# Patient Record
Sex: FEMALE | Race: BLACK OR AFRICAN AMERICAN | NOT HISPANIC OR LATINO | Employment: FULL TIME | URBAN - METROPOLITAN AREA
[De-identification: names, ages, dates, MRNs, and addresses within clinical notes are randomized per-mention and may not be internally consistent; named-entity substitution may affect disease eponyms.]

---
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
## 2018-02-05 ENCOUNTER — TELEPHONE (OUTPATIENT)
Dept: OBSTETRICS AND GYNECOLOGY | Facility: CLINIC | Age: 18
End: 2018-02-05

## 2018-02-05 NOTE — TELEPHONE ENCOUNTER
Left message for pt to call office. Pt needs to schedule appointment for IUD insertion. Make sure pt is on menses. PAXTON

## 2018-02-19 ENCOUNTER — TELEPHONE (OUTPATIENT)
Dept: OBSTETRICS AND GYNECOLOGY | Facility: CLINIC | Age: 18
End: 2018-02-19

## 2018-02-19 NOTE — TELEPHONE ENCOUNTER
Left message to call office. Pt needs to schedule appointment for IUD insertion. Make sure pt is on menses. PAXTON

## 2018-03-26 ENCOUNTER — TELEPHONE (OUTPATIENT)
Dept: OBSTETRICS AND GYNECOLOGY | Facility: CLINIC | Age: 18
End: 2018-03-26

## 2018-05-23 ENCOUNTER — TELEPHONE (OUTPATIENT)
Dept: OBSTETRICS AND GYNECOLOGY | Facility: CLINIC | Age: 18
End: 2018-05-23

## 2018-05-23 NOTE — TELEPHONE ENCOUNTER
Called pt could not leave a message. Pt needs to schedule appointment for IUD insertion when pt is on menses. PAXTON

## 2018-07-25 ENCOUNTER — TELEPHONE (OUTPATIENT)
Dept: OBSTETRICS AND GYNECOLOGY | Facility: CLINIC | Age: 18
End: 2018-07-25

## 2018-12-11 ENCOUNTER — INITIAL PRENATAL (OUTPATIENT)
Dept: OBSTETRICS AND GYNECOLOGY | Facility: CLINIC | Age: 18
End: 2018-12-11

## 2018-12-11 VITALS — SYSTOLIC BLOOD PRESSURE: 119 MMHG | DIASTOLIC BLOOD PRESSURE: 74 MMHG | WEIGHT: 158 LBS | BODY MASS INDEX: 26.29 KG/M2

## 2018-12-11 DIAGNOSIS — N92.6 MISSED MENSES: ICD-10-CM

## 2018-12-11 DIAGNOSIS — Z02.83 ENCOUNTER FOR DRUG SCREENING: ICD-10-CM

## 2018-12-11 DIAGNOSIS — Z11.3 SCREEN FOR STD (SEXUALLY TRANSMITTED DISEASE): ICD-10-CM

## 2018-12-11 DIAGNOSIS — Z34.81 MULTIGRAVIDA IN FIRST TRIMESTER: Primary | ICD-10-CM

## 2018-12-11 DIAGNOSIS — O21.9 NAUSEA AND VOMITING IN PREGNANCY: ICD-10-CM

## 2018-12-11 DIAGNOSIS — Z11.4 SCREENING FOR HIV (HUMAN IMMUNODEFICIENCY VIRUS): ICD-10-CM

## 2018-12-11 PROBLEM — Z30.09 BIRTH CONTROL COUNSELING: Status: RESOLVED | Noted: 2017-12-20 | Resolved: 2018-12-11

## 2018-12-11 PROBLEM — Z20.2 CHLAMYDIA CONTACT, TREATED: Status: RESOLVED | Noted: 2017-12-20 | Resolved: 2018-12-11

## 2018-12-11 PROBLEM — A74.9 CHLAMYDIA INFECTION: Status: RESOLVED | Noted: 2017-12-12 | Resolved: 2018-12-11

## 2018-12-11 PROBLEM — Z30.011 ENCOUNTER FOR INITIAL PRESCRIPTION OF CONTRACEPTIVE PILLS: Status: RESOLVED | Noted: 2017-12-20 | Resolved: 2018-12-11

## 2018-12-11 LAB
B-HCG UR QL: POSITIVE
INTERNAL NEGATIVE CONTROL: NEGATIVE
INTERNAL POSITIVE CONTROL: POSITIVE
Lab: ABNORMAL

## 2018-12-11 PROCEDURE — 81025 URINE PREGNANCY TEST: CPT | Performed by: OBSTETRICS & GYNECOLOGY

## 2018-12-11 PROCEDURE — 99214 OFFICE O/P EST MOD 30 MIN: CPT | Performed by: OBSTETRICS & GYNECOLOGY

## 2018-12-11 RX ORDER — DIPHENHYDRAMINE HYDROCHLORIDE 25 MG/1
25 CAPSULE ORAL 3 TIMES DAILY
Qty: 90 TABLET | Refills: 2 | Status: SHIPPED | OUTPATIENT
Start: 2018-12-11 | End: 2019-01-24

## 2018-12-11 RX ORDER — ASCORBIC ACID, CALCIUM CITRATE, IRON, VITAMIN D, DL- ALPHA- TOCOPHEROL ACETATE, THIAMINE, RIBOFLAVIN, NIACINAMIDE, PYRIDOXINE HYDROCHLORIDE, FOLIC ACID, IODINE, ZINC, COPPER, DOCUSATE SODIUM, DOCONEXENT AND ICOSAPENT
1 KIT DAILY
Qty: 30 TABLET | Refills: 11 | Status: SHIPPED | OUTPATIENT
Start: 2018-12-11 | End: 2019-11-19

## 2018-12-11 NOTE — PROGRESS NOTES
Chief complaint: Prenatal visit  History of present illness: Patient is here for her initial prenatal visit.  She states that her last menstrual period was 18.  She reports that she had a positive pregnancy test about 2-2-1/2 weeks ago.  She states that she has also been feeling nauseated for about the last 2 weeks.  She does not recall having a lot of nausea or vomiting during her first pregnancy.  She denies pelvic pain or vaginal bleeding.  She does report occasional episodes of sharp chest pains that radiate into her back.  She also states that she feels short of breath when these issues occur.  She states that they mostly happen when she is at work.  She was wondering if this is normal in pregnancy.  She is not having these symptoms currently.  She does have a history of asthma but has not required medication for several years.  She quit smoking when she found out that she was pregnant.    Obstetric History       T1      L1     SAB0   TAB0   Ectopic0   Molar0   Multiple0   Live Births1       # Outcome Date GA Lbr Brain/2nd Weight Sex Delivery Anes PTL Lv   2 Current            1 Term 17 40w3d 21:50 / 01:35 3669 g (8 lb 1.4 oz) M Vag-Spont EPI N DUSTY      Name: SILVESTRE [facility]      Apgar1:  8                Apgar5: 9        Past Medical History:   Diagnosis Date   • Asthma      Past Surgical History:   Procedure Laterality Date   • WISDOM TOOTH EXTRACTION       Family History   Problem Relation Age of Onset   • Diabetes Father      Social History     Tobacco Use   • Smoking status: Former Smoker   • Smokeless tobacco: Never Used   Substance Use Topics   • Alcohol use: No   • Drug use: No     Meds: None    No Known Allergies     ROS:  General: No fever or chills  Constitutional: No weight loss or gain, no hair loss  HENT: No headache, no hearing loss, no tinnitus  Eyes: normal vision, no eye pain  Lungs: Pos cough, Pos shortness of breath - but none o fthese today  Heart: No chest  pain, no palpitations  Abdomen: Pos nausea, Pos vomiting, no constipation or diarrhea  : No dysuria, no hematuria  Skin: No rashes  Lymph: No swelling  Neuro: No parathesia, no weakness  Psych: Normal though content, no hallucinations, no SI/HI    PE:  Vitals:    18 1454   BP: 119/74   Weight: 71.7 kg (158 lb)     See physical exam in initial prenatal flowsheet.  Of note, lungs are clear to auscultation without crackles or wheezes.  Patient has normal respiratory effort and is able to perform complete sentences without difficulty    Assessment:  1.  17-year-old  2 para 1 at 4-6/7 weeks gestational age  2.  Nausea and vomiting of pregnancy  3.  Atypical chest pain, not current    Plan:  1. Initial prenatal counseling performed today. Hospital and call coverage system discussed. Pt is recommended to start PNV. Prenatal care educational handouts provided in after visit summary. OTC medications discussed. Discussed typical schedule of prenatal visits. Obtain US 1-2 wks for dating. ROutine labs today with cultures and OB profile panel.  2. Discussed nausea and vomiting in pregnancy. Discussed diet and lifestyle modifications to help prevent nausea. Discussed use of vitamin B6 and doxylamine up to three times a day as first line pharmacologic therapy. Will send prescription to pharmacy. Weight gain expectations discussed with pt.  3.  Patient's history is had a positive pregnancy test for 2 weeks and has been nauseated for 2 weeks.  As such it would seem that she might be further along than what we would think based on her last menstrual period.  The patient states it is not possible for her to be further along.  We will try to get an ultrasound in 1-2 weeks to confirm viability and dating.  4.  Regarding the patient's chest pain, I explained that this is not a normal complication of pregnancy.  She currently feels well with normal vital signs in a normal heart and lung exam.  He doesn't feel there is need  for immediate evaluation.  I feel the risk of serious etiologies such as pulmonary embolism, MI, aortic aneurysm, etc. are extremely unlikely, especially given current findings.  I explained to the patient that if she begins to have the symptoms again, she should come into the hospital for evaluation.  She verbalized understanding.

## 2018-12-12 LAB — HCG INTACT+B SERPL-ACNC: NORMAL MIU/ML

## 2018-12-13 DIAGNOSIS — A74.9 CHLAMYDIA INFECTION AFFECTING PREGNANCY IN FIRST TRIMESTER: Primary | ICD-10-CM

## 2018-12-13 DIAGNOSIS — O98.811 CHLAMYDIA INFECTION AFFECTING PREGNANCY IN FIRST TRIMESTER: Primary | ICD-10-CM

## 2018-12-13 LAB
ABO GROUP BLD: (no result)
BACTERIA UR CULT: NO GROWTH
BACTERIA UR CULT: NORMAL
BASOPHILS # BLD AUTO: 0 X10E3/UL (ref 0–0.2)
BASOPHILS NFR BLD AUTO: 0 %
BLD GP AB SCN SERPL QL: NEGATIVE
C TRACH RRNA SPEC QL NAA+PROBE: POSITIVE
EOSINOPHIL # BLD AUTO: 0.1 X10E3/UL (ref 0–0.4)
EOSINOPHIL NFR BLD AUTO: 1 %
ERYTHROCYTE [DISTWIDTH] IN BLOOD BY AUTOMATED COUNT: 13.6 % (ref 12.3–15.4)
HBV SURFACE AG SERPL QL IA: NEGATIVE
HCT VFR BLD AUTO: 37 % (ref 34–46.6)
HCV AB S/CO SERPL IA: 0.2 S/CO RATIO (ref 0–0.9)
HGB BLD-MCNC: 12.3 G/DL (ref 11.1–15.9)
HIV 1+2 AB+HIV1 P24 AG SERPL QL IA: NON REACTIVE
IMM GRANULOCYTES # BLD: 0 X10E3/UL (ref 0–0.1)
IMM GRANULOCYTES NFR BLD: 0 %
LYMPHOCYTES # BLD AUTO: 2.1 X10E3/UL (ref 0.7–3.1)
LYMPHOCYTES NFR BLD AUTO: 30 %
MCH RBC QN AUTO: 31.9 PG (ref 26.6–33)
MCHC RBC AUTO-ENTMCNC: 33.2 G/DL (ref 31.5–35.7)
MCV RBC AUTO: 96 FL (ref 79–97)
MONOCYTES # BLD AUTO: 0.7 X10E3/UL (ref 0.1–0.9)
MONOCYTES NFR BLD AUTO: 9 %
N GONORRHOEA RRNA SPEC QL NAA+PROBE: NEGATIVE
NEUTROPHILS # BLD AUTO: 4.3 X10E3/UL (ref 1.4–7)
NEUTROPHILS NFR BLD AUTO: 60 %
PLATELET # BLD AUTO: 213 X10E3/UL (ref 150–379)
RBC # BLD AUTO: 3.86 X10E6/UL (ref 3.77–5.28)
RH BLD: POSITIVE
RPR SER QL: NON REACTIVE
RUBV IGG SERPL IA-ACNC: 3.16 INDEX
T VAGINALIS RRNA VAG QL NAA+PROBE: NEGATIVE
WBC # BLD AUTO: 7.1 X10E3/UL (ref 3.4–10.8)

## 2018-12-13 RX ORDER — AZITHROMYCIN 500 MG/1
TABLET, FILM COATED ORAL
Qty: 2 TABLET | Refills: 0 | Status: SHIPPED | OUTPATIENT
Start: 2018-12-13 | End: 2018-12-27

## 2018-12-14 ENCOUNTER — TELEPHONE (OUTPATIENT)
Dept: OBSTETRICS AND GYNECOLOGY | Facility: CLINIC | Age: 18
End: 2018-12-14

## 2018-12-14 NOTE — TELEPHONE ENCOUNTER
----- Message from Darin Casas MD sent at 12/13/2018  3:09 PM EST -----  Notify the patient that her chlamydia test was positive.  I sent in a prescription for azithromycin.  She will need to notify her partner and he will need to be tested and/or treated as well.  They must abstain from sexual intercourse until 7 days after both have completed treatment.

## 2018-12-19 LAB
11OH-THC SPEC-MCNC: NEGATIVE NG/ML
AMPHETAMINES SERPL QL SCN: NEGATIVE NG/ML
BARBITURATES SERPL QL SCN: NEGATIVE UG/ML
BENZODIAZ SERPL QL SCN: NEGATIVE NG/ML
CANNABIDIOL SERPLBLD CFM-MCNC: NEGATIVE NG/ML
CANNABINOIDS SERPL QL SCN: ABNORMAL NG/ML
CANNABINOIDS SPEC QL CFM: POSITIVE
CANNABINOL: NEGATIVE NG/ML
CARBOXYTHC SPEC-MCNC: 3.9 NG/ML
COCAINE+BZE SERPL QL SCN: NEGATIVE NG/ML
METHADONE SERPL QL SCN: NEGATIVE NG/ML
OPIATES SERPL QL SCN: NEGATIVE NG/ML
OXYCODONE+OXYMORPHONE SERPLBLD QL SCN: NEGATIVE NG/ML
PCP SERPL QL SCN: NEGATIVE NG/ML
PROPOXYPH SERPL QL SCN: NEGATIVE NG/ML
THC SERPLBLD CFM-MCNC: NEGATIVE NG/ML

## 2018-12-27 ENCOUNTER — ROUTINE PRENATAL (OUTPATIENT)
Dept: OBSTETRICS AND GYNECOLOGY | Facility: CLINIC | Age: 18
End: 2018-12-27

## 2018-12-27 ENCOUNTER — PROCEDURE VISIT (OUTPATIENT)
Dept: OBSTETRICS AND GYNECOLOGY | Facility: CLINIC | Age: 18
End: 2018-12-27

## 2018-12-27 VITALS — DIASTOLIC BLOOD PRESSURE: 60 MMHG | SYSTOLIC BLOOD PRESSURE: 116 MMHG | WEIGHT: 164 LBS | BODY MASS INDEX: 27.29 KG/M2

## 2018-12-27 DIAGNOSIS — O21.9 NAUSEA AND VOMITING IN PREGNANCY: ICD-10-CM

## 2018-12-27 DIAGNOSIS — J45.909 ASTHMA AFFECTING PREGNANCY IN FIRST TRIMESTER: ICD-10-CM

## 2018-12-27 DIAGNOSIS — O99.511 ASTHMA AFFECTING PREGNANCY IN FIRST TRIMESTER: ICD-10-CM

## 2018-12-27 DIAGNOSIS — Z36.89 ENCOUNTER TO ESTABLISH GESTATIONAL AGE USING ULTRASOUND: Primary | ICD-10-CM

## 2018-12-27 DIAGNOSIS — Z34.81 MULTIGRAVIDA IN FIRST TRIMESTER: Primary | ICD-10-CM

## 2018-12-27 PROBLEM — Z11.4 SCREENING FOR HIV (HUMAN IMMUNODEFICIENCY VIRUS): Status: RESOLVED | Noted: 2018-12-11 | Resolved: 2018-12-27

## 2018-12-27 PROBLEM — Z02.83 ENCOUNTER FOR DRUG SCREENING: Status: RESOLVED | Noted: 2018-12-11 | Resolved: 2018-12-27

## 2018-12-27 PROCEDURE — 99213 OFFICE O/P EST LOW 20 MIN: CPT | Performed by: OBSTETRICS & GYNECOLOGY

## 2018-12-27 PROCEDURE — 76817 TRANSVAGINAL US OBSTETRIC: CPT | Performed by: OBSTETRICS & GYNECOLOGY

## 2018-12-27 RX ORDER — ALBUTEROL SULFATE 90 UG/1
AEROSOL, METERED RESPIRATORY (INHALATION)
Refills: 1 | COMMUNITY
Start: 2018-12-24 | End: 2020-12-04

## 2018-12-27 NOTE — PROGRESS NOTES
Chief complaint: Prenatal visit  History of present illness: Patient is here for her routine prenatal visit.  She did go to [facility] about 4 days ago because of concerns of chest pain and shortness of breath.  Workup there was unremarkable.  Felt that may be related to asthma.  Patient was given a prescription for a new inhaler, and she states that she has done well since then.  She states that she does still have some nausea, but no significant episodes of emesis.  She has gained 6 pounds since her initial visit.  She reports that both she and her partner did complete treatment for Chlamydia.  Objective: See vital signs in flowsheet  Gen.: No acute distress, awake and oriented ×3  Abdomen: Soft, nontender, fetal heart tones 159  Extremities: No edema, no tenderness  Psychiatric: Good judgment and insight, normal affect and mood  Labs: Prenatal labs reviewed, flowsheet updated  Ultrasound today: Live single intrauterine pregnancy measuring 7 weeks and 4 days, consistent with dates  Assessment:  1.  18-year-old  2 para 1 at 7 and one sevenths weeks gestational age  2.  Recent chlamydia infection, status post treatment  3.  Nausea of pregnancy, stable  4.  Asthma in pregnancy, stable  Plan:  1.  Ultrasound and laboratory results reviewed with the patient.  Limitations of testing explained.  We will plan ultrasound in 18-20 weeks for anatomy.  2.  Continue albuterol inhaler as needed.  I do not feel that her chest pain issues are of cardiac etiology based on symptoms, presentation, and findings in the emergency room.  3.  Return to office in 4 weeks

## 2019-01-24 ENCOUNTER — ROUTINE PRENATAL (OUTPATIENT)
Dept: OBSTETRICS AND GYNECOLOGY | Facility: CLINIC | Age: 19
End: 2019-01-24

## 2019-01-24 VITALS — WEIGHT: 167 LBS | BODY MASS INDEX: 27.79 KG/M2 | DIASTOLIC BLOOD PRESSURE: 77 MMHG | SYSTOLIC BLOOD PRESSURE: 119 MMHG

## 2019-01-24 DIAGNOSIS — Z36.0 ENCOUNTER FOR ANTENATAL SCREENING FOR CHROMOSOMAL ANOMALIES: ICD-10-CM

## 2019-01-24 DIAGNOSIS — Z11.3 SCREEN FOR STD (SEXUALLY TRANSMITTED DISEASE): ICD-10-CM

## 2019-01-24 DIAGNOSIS — Z34.81 MULTIGRAVIDA IN FIRST TRIMESTER: Primary | ICD-10-CM

## 2019-01-24 LAB — EXTERNAL NIPT: NORMAL

## 2019-01-24 PROCEDURE — 99213 OFFICE O/P EST LOW 20 MIN: CPT | Performed by: OBSTETRICS & GYNECOLOGY

## 2019-01-24 NOTE — PROGRESS NOTES
Chief complaint: Prenatal visit  History present was: Patient has no major complaints today.  She is here for routine prenatal visit.  No nausea or vomiting.  No vaginal bleeding or leakage of fluid.  Objective: See vital signs in flowsheet  Gen.: No acute distress, awake and oriented ×3  Abdomen: Soft, nontender, no hernias or masses, fetal heart tones 160  Extremities: No edema, no tenderness  Psychiatric: Good judgment and insight, normal affect and mood  Assessment:  1.  18-year-old  2 para 1 at 11 and one sevenths weeks gestational age  2.  History of chlamydia infection in the first trimester, status post treatment  Plan:  1.  Gonorrhea and chlamydia test cure today.  2.  We discussed screening for aneuploidy.  The patient like to proceed with cell free DNA.  This will be drawn today.  3.  Return to the office in 4 weeks.

## 2019-01-26 LAB
C TRACH RRNA SPEC QL NAA+PROBE: NEGATIVE
N GONORRHOEA RRNA SPEC QL NAA+PROBE: NEGATIVE
T VAGINALIS RRNA VAG QL NAA+PROBE: NEGATIVE

## 2019-02-01 ENCOUNTER — TELEPHONE (OUTPATIENT)
Dept: OBSTETRICS AND GYNECOLOGY | Facility: CLINIC | Age: 19
End: 2019-02-01

## 2019-02-01 LAB
# FETUSES US: 1
CFDNA.FET/CFDNA.TOTAL SFR FETUS: 8.7 %
CHR X + Y ANEUP PLAS.CFDNA: NORMAL
CITATION REF LAB TEST: NORMAL
CYTOGENETICS STUDY: NORMAL
FET 13+18+21+X+Y ANEUP PLAS.CFDNA: NORMAL
FET CHR 13 TS PLAS.CFDNA QL: NORMAL
FET CHR 13 TS PLAS.CFDNA QL: NORMAL
FET CHR 18 TS PLAS.CFDNA QL: NORMAL
FET CHR 18 TS PLAS.CFDNA QL: NORMAL
FET CHR 21 TS PLAS.CFDNA QL: NORMAL
FET CHR 21 TS PLAS.CFDNA QL: NORMAL
FET CHROM X + Y ANEUP CFDNA IMP: NORMAL
GA: 11.1 WEEKS
GENETIC ALGORITHM SENSITIVITY: NORMAL %
LAB DIRECTOR NAME PROVIDER: NORMAL
Lab: NORMAL
REASON FOR REFERRAL (NARRATIVE): NORMAL
REF LAB TEST METHOD: NORMAL
SERVICE CMNT 02-IMP: NORMAL
SERVICE CMNT-IMP: NORMAL

## 2019-02-01 NOTE — TELEPHONE ENCOUNTER
----- Message from Darin Casas MD sent at 2/1/2019  1:06 PM EST -----  Notify the patient that her cell free DNA (chromosome test) test was normal.  If she would like to know the gender of the baby, the baby is a boy

## 2019-02-21 ENCOUNTER — ROUTINE PRENATAL (OUTPATIENT)
Dept: OBSTETRICS AND GYNECOLOGY | Facility: CLINIC | Age: 19
End: 2019-02-21

## 2019-02-21 VITALS — BODY MASS INDEX: 28.96 KG/M2 | WEIGHT: 174 LBS | SYSTOLIC BLOOD PRESSURE: 121 MMHG | DIASTOLIC BLOOD PRESSURE: 66 MMHG

## 2019-02-21 DIAGNOSIS — Z34.82 MULTIGRAVIDA IN SECOND TRIMESTER: Primary | ICD-10-CM

## 2019-02-21 PROBLEM — Z34.81 MULTIGRAVIDA IN FIRST TRIMESTER: Status: RESOLVED | Noted: 2018-12-11 | Resolved: 2019-02-21

## 2019-02-21 PROBLEM — Z36.0 ENCOUNTER FOR ANTENATAL SCREENING FOR CHROMOSOMAL ANOMALIES: Status: RESOLVED | Noted: 2019-01-24 | Resolved: 2019-02-21

## 2019-02-21 PROBLEM — Z11.3 SCREEN FOR STD (SEXUALLY TRANSMITTED DISEASE): Status: RESOLVED | Noted: 2018-12-11 | Resolved: 2019-02-21

## 2019-02-21 PROCEDURE — 99213 OFFICE O/P EST LOW 20 MIN: CPT | Performed by: OBSTETRICS & GYNECOLOGY

## 2019-02-21 NOTE — PROGRESS NOTES
Chief complaint: Prenatal visit  History of present illness: Patient is here for her routine prenatal visit.  She did go to [facility] emergency room about 10 days ago with concerns over abdominal pains.  She had reported sharp pains on the right lower quadrant that seemed to happen more when she was laying down and went to get up.  She also reports some occasional achy pains in her thighs.  Otherwise she is doing well.  She has started to notice fetal movement just today.  No vaginal bleeding or leakage of fluid.  When she was at [facility], ultrasound was performed which was unremarkable.  She was diagnosed with round ligament pain.  Objective: See vital signs and flowsheet  General: No acute distress, awake and oriented x3  Abdomen: Soft, nontender, nondistended, no hernias or masses, fetal heart tones 150  Extremities: No edema, no tenderness  Psychiatric: Good judgment and insight, normal affect and mood  Labs: Noninvasive prenatal testing with no aneuploidy  Assessment:  1.  18-year-old  2 para 1 at 15-1/7 weeks gestational age  2.  Recent pains consistent with round ligament pain  Plan:  1.  We discussed the patient's recent concerns of her pains.  They sound classical for round ligament pain.  Reassurance offered.  She may try over-the-counter Tylenol, heating pads on her thighs or hips, etc.  2.  Discussed results of cell free DNA testing.  Limitations of testing explained.  3.  Explained to patient that she will need to come to [facility] for now on.  Explained that our doctors do not admit at [facility].  She verbalized understanding.  For return to the office in 4 weeks.  Ultrasound for vivi in 4 weeks

## 2019-03-20 ENCOUNTER — PROCEDURE VISIT (OUTPATIENT)
Dept: OBSTETRICS AND GYNECOLOGY | Facility: CLINIC | Age: 19
End: 2019-03-20

## 2019-03-20 ENCOUNTER — ROUTINE PRENATAL (OUTPATIENT)
Dept: OBSTETRICS AND GYNECOLOGY | Facility: CLINIC | Age: 19
End: 2019-03-20

## 2019-03-20 VITALS — SYSTOLIC BLOOD PRESSURE: 109 MMHG | BODY MASS INDEX: 30.12 KG/M2 | WEIGHT: 181 LBS | DIASTOLIC BLOOD PRESSURE: 72 MMHG

## 2019-03-20 DIAGNOSIS — Z34.82 MULTIGRAVIDA IN SECOND TRIMESTER: Primary | ICD-10-CM

## 2019-03-20 DIAGNOSIS — Z36.89 ENCOUNTER FOR FETAL ANATOMIC SURVEY: Primary | ICD-10-CM

## 2019-03-20 PROCEDURE — 76805 OB US >/= 14 WKS SNGL FETUS: CPT | Performed by: OBSTETRICS & GYNECOLOGY

## 2019-03-20 PROCEDURE — 99212 OFFICE O/P EST SF 10 MIN: CPT | Performed by: OBSTETRICS & GYNECOLOGY

## 2019-03-20 RX ORDER — FLUTICASONE PROPIONATE 50 MCG
SPRAY, SUSPENSION (ML) NASAL
COMMUNITY
End: 2020-12-04

## 2019-03-20 NOTE — PROGRESS NOTES
Chief complaint: Prenatal visit  History of present illness: Patient is here for her routine prenatal visit.  She has no major complaints today.  Normal fetal movement.  No contractions, vaginal bleeding, leakage of fluid.  Objective: See vital signs and flowsheet  General: No acute distress, awake and oriented x3  Abdomen: Soft, nontender, no hernias or masses, fetal heart tones 150  Extremities: No edema, no tenderness  Psychiatric: Good judgment and insight, normal affect mood  Ultrasound today: Normal anatomic survey, appropriate growth  Assessment:   1.  18-year-old  2 para 1 at 19-0/7 weeks gestational age  2.  Asthma in pregnancy, mild intermittent  Plan:  1.  Ultrasound findings were discussed with the patient.  Limitations of ultrasound were explained.  2.  I notified the patient that I will be leaving the practice after 2019.  Patient says that she will consider which other doctors she would like to see in the group.  She will see me for her next visit.  Return to the office in 4 weeks.

## 2019-04-17 ENCOUNTER — ROUTINE PRENATAL (OUTPATIENT)
Dept: OBSTETRICS AND GYNECOLOGY | Facility: CLINIC | Age: 19
End: 2019-04-17

## 2019-04-17 VITALS — BODY MASS INDEX: 31.95 KG/M2 | SYSTOLIC BLOOD PRESSURE: 113 MMHG | DIASTOLIC BLOOD PRESSURE: 73 MMHG | WEIGHT: 192 LBS

## 2019-04-17 DIAGNOSIS — O99.511 ASTHMA AFFECTING PREGNANCY IN FIRST TRIMESTER: ICD-10-CM

## 2019-04-17 DIAGNOSIS — Z13.1 SCREENING FOR DIABETES MELLITUS: ICD-10-CM

## 2019-04-17 DIAGNOSIS — Z34.82 MULTIGRAVIDA IN SECOND TRIMESTER: Primary | ICD-10-CM

## 2019-04-17 DIAGNOSIS — J45.909 ASTHMA AFFECTING PREGNANCY IN FIRST TRIMESTER: ICD-10-CM

## 2019-04-17 PROCEDURE — 99214 OFFICE O/P EST MOD 30 MIN: CPT | Performed by: OBSTETRICS & GYNECOLOGY

## 2019-04-17 NOTE — PROGRESS NOTES
Chief complaint: Prenatal visit  History of present illness: Patient is here for her routine prenatal visit.  She reports that she had not felt the baby move at all today.  She reports typically the baby moves frequently throughout the day.  During the process of the interview, the patient actually noted that the baby was starting to move.  When we auscultated the fetal heart tones, audible fetal movements were noted, and the patient was able to state that she was feeling movements at that time.  Patient is otherwise without major complaints.  Objective: See vital signs and flowsheet  General: No acute distress, awake and oriented x3  Abdomen: Soft, nontender, no hernias or masses, fetal heart tones 150, audible fetal movements auscultated with a Doppler  Extremities: No lower extremity edema, no tenderness  Psychiatric: Good judgment insight, normal affect mood  Assessment:  1.  18-year-old  2 para 1 at 23-0/7 weeks gestational age  2.  Asthma in pregnancy, mild intermittent  3.  Excessive weight gain  Plan:  1.  We discussed expectations for fetal movement.  We discussed fetal kick counts after 24 weeks gestation.  2.  When I was in the room, the patient had a bag of chips, a bag of chocolate chip cookies, and Mountain Dew.  Patient reports that she does snack frequently throughout the day.  We discussed healthier dietary choices.  Advised to minimize caffeine consumption.  Advised to minimize carbohydrate and sugar consumption.  Patient needs to keep an eye on her weight gain, because it has been ahead of schedule.  3.  Glucose screen at the next visit.  Return to the office in 4 weeks

## 2019-05-01 ENCOUNTER — TELEPHONE (OUTPATIENT)
Dept: OBSTETRICS AND GYNECOLOGY | Facility: CLINIC | Age: 19
End: 2019-05-01

## 2019-05-01 NOTE — TELEPHONE ENCOUNTER
OB called and she states that she is feeling dizzy and just not herself. When your standing your mind goes blank and out of breath to where she has to sit down. That's how she explained. She would like to know if that is normal and if there is anything that she can do.     Can leave a detailed message on voicemail.      Thank you

## 2019-05-01 NOTE — TELEPHONE ENCOUNTER
She should try to hydrate a lot - drink several glasses of water - and rest. If this feeling persists or if she is very concerned, she could go to [facility] to be evaluated.

## 2019-05-15 ENCOUNTER — ROUTINE PRENATAL (OUTPATIENT)
Dept: OBSTETRICS AND GYNECOLOGY | Facility: CLINIC | Age: 19
End: 2019-05-15

## 2019-05-15 VITALS — DIASTOLIC BLOOD PRESSURE: 73 MMHG | BODY MASS INDEX: 33.45 KG/M2 | WEIGHT: 201 LBS | SYSTOLIC BLOOD PRESSURE: 113 MMHG

## 2019-05-15 DIAGNOSIS — J45.909 ASTHMA AFFECTING PREGNANCY IN FIRST TRIMESTER: ICD-10-CM

## 2019-05-15 DIAGNOSIS — O99.511 ASTHMA AFFECTING PREGNANCY IN FIRST TRIMESTER: ICD-10-CM

## 2019-05-15 DIAGNOSIS — O26.03 EXCESSIVE WEIGHT GAIN DURING PREGNANCY IN THIRD TRIMESTER: ICD-10-CM

## 2019-05-15 DIAGNOSIS — Z13.1 SCREENING FOR DIABETES MELLITUS: ICD-10-CM

## 2019-05-15 DIAGNOSIS — Z34.82 MULTIGRAVIDA IN SECOND TRIMESTER: Primary | ICD-10-CM

## 2019-05-15 PROCEDURE — 99213 OFFICE O/P EST LOW 20 MIN: CPT | Performed by: OBSTETRICS & GYNECOLOGY

## 2019-05-15 NOTE — PROGRESS NOTES
Chief complaint: Prenatal visit  History of present illness: Patient is here for routine prenatal visit.  She has no major complaints today.  She reports good fetal movement.  She denies contractions, vaginal bleeding, leakage of fluid.  Objective: See vital signs and flowsheet  General: No acute distress, awake and oriented x3  Abdomen: Soft, nontender, gravid, fetal heart tones 145  Extremities: No lower extremity edema, no tenderness  Psychiatric: Good judgment insight, normal affect and mood  Assessment:  1.  18-year-old  2 para 1 at 27-0/7 weeks gestational age  2.  Asthma in pregnancy, mild intermittent  3.  Excessive weight gain in pregnancy  Plan:  1.  Patient has already gained about 45 pounds.  I explained that this is over the recommended weight gain.  We discussed diet and exercise in pregnancy.  The patient is encouraged to monitor weight gain throughout the pregnancy.  2.  We discussed breast-feeding.  The patient feels that she would like to try to breast-feed for longer in this pregnancy.  3 we discussed postpartum contraceptive plans.  Various options were discussed, the patient seems most interested in Mirena.  Risk, benefits, alternatives, and side effects discussed.  4. return to the office in 2 weeks in 4 weeks.  Ultrasound in 4 weeks for growth secondary to excessive weight gain and asthma

## 2019-05-16 ENCOUNTER — TELEPHONE (OUTPATIENT)
Dept: OBSTETRICS AND GYNECOLOGY | Facility: CLINIC | Age: 19
End: 2019-05-16

## 2019-05-16 LAB — GLUCOSE 1H P 50 G GLC PO SERPL-MCNC: 102 MG/DL (ref 65–139)

## 2019-05-16 NOTE — TELEPHONE ENCOUNTER
Pt called, stating that after she had drank her glucose drink yesterday she has had a headache, and is complaining of still having a headache this morning. Pt is wanting to know if this was normal or if it could be related. Please advise.       Pt callback: [phone]

## 2019-05-16 NOTE — TELEPHONE ENCOUNTER
There is no medical reason that I think that would be related to the glucose.  It is likely a coincidence.  She may try taking 2 extra strength Tylenol to see if the headache improves.  She should also try drinking more fluids in general as headaches in pregnancy can often be related to dehydration.  She can try drinking a caffeinated drink as well such as a Coke or tea.  If there is no improvement in her headache with these measures and the patient is very concerned, she can go to [facility] labor and delivery for evaluation

## 2019-05-17 ENCOUNTER — RESULTS ENCOUNTER (OUTPATIENT)
Dept: OBSTETRICS AND GYNECOLOGY | Facility: CLINIC | Age: 19
End: 2019-05-17

## 2019-05-17 DIAGNOSIS — Z13.1 SCREENING FOR DIABETES MELLITUS: ICD-10-CM

## 2019-05-30 ENCOUNTER — ROUTINE PRENATAL (OUTPATIENT)
Dept: OBSTETRICS AND GYNECOLOGY | Facility: CLINIC | Age: 19
End: 2019-05-30

## 2019-05-30 VITALS — DIASTOLIC BLOOD PRESSURE: 64 MMHG | BODY MASS INDEX: 34.45 KG/M2 | WEIGHT: 207 LBS | SYSTOLIC BLOOD PRESSURE: 122 MMHG

## 2019-05-30 DIAGNOSIS — Z23 NEED FOR TDAP VACCINATION: ICD-10-CM

## 2019-05-30 DIAGNOSIS — O26.03 EXCESSIVE WEIGHT GAIN DURING PREGNANCY IN THIRD TRIMESTER: ICD-10-CM

## 2019-05-30 DIAGNOSIS — Z34.82 MULTIGRAVIDA IN SECOND TRIMESTER: Primary | ICD-10-CM

## 2019-05-30 PROCEDURE — 90471 IMMUNIZATION ADMIN: CPT | Performed by: OBSTETRICS & GYNECOLOGY

## 2019-05-30 PROCEDURE — 90715 TDAP VACCINE 7 YRS/> IM: CPT | Performed by: OBSTETRICS & GYNECOLOGY

## 2019-05-30 PROCEDURE — 99213 OFFICE O/P EST LOW 20 MIN: CPT | Performed by: OBSTETRICS & GYNECOLOGY

## 2019-05-30 NOTE — PROGRESS NOTES
Chief complaint: Prenatal visit  History of present illness: Patient is here for routine prenatal visit.  She has no major complaints today.  Normal fetal movement.  No contractions, vaginal bleeding, leakage of fluid.  Objective: See vital signs and flowsheet  General: No acute distress, awake and oriented x3  Abdomen: Soft, nontender, gravid, fetal heart tones 140  Extremities: No lower extremity edema, no tenderness  Psychiatric: Good judgment and insight, normal affect and mood  Labs: 1 hour glucose 102  Assessment:  1.  18-year-old  2 para 1 at 29 1/7 weeks gestational age  2.  Excessive weight gain in pregnancy  3.  Asthma in pregnancy  Plan:  1.  Tdap today.  2.  Ultrasound next visit to evaluate for growth secondary to excess maternal weight gain and asthma  Return to office in 2 weeks

## 2019-06-12 ENCOUNTER — ROUTINE PRENATAL (OUTPATIENT)
Dept: OBSTETRICS AND GYNECOLOGY | Facility: CLINIC | Age: 19
End: 2019-06-12

## 2019-06-12 ENCOUNTER — PROCEDURE VISIT (OUTPATIENT)
Dept: OBSTETRICS AND GYNECOLOGY | Facility: CLINIC | Age: 19
End: 2019-06-12

## 2019-06-12 VITALS — WEIGHT: 210 LBS | DIASTOLIC BLOOD PRESSURE: 67 MMHG | BODY MASS INDEX: 34.95 KG/M2 | SYSTOLIC BLOOD PRESSURE: 97 MMHG

## 2019-06-12 DIAGNOSIS — R82.90 MALODOROUS URINE: ICD-10-CM

## 2019-06-12 DIAGNOSIS — J45.909 ASTHMA AFFECTING PREGNANCY, ANTEPARTUM: ICD-10-CM

## 2019-06-12 DIAGNOSIS — O99.519 ASTHMA AFFECTING PREGNANCY, ANTEPARTUM: ICD-10-CM

## 2019-06-12 DIAGNOSIS — O26.03 EXCESSIVE WEIGHT GAIN DURING PREGNANCY IN THIRD TRIMESTER: Primary | ICD-10-CM

## 2019-06-12 DIAGNOSIS — Z36.89 ENCOUNTER FOR ULTRASOUND TO CHECK FETAL GROWTH: Primary | ICD-10-CM

## 2019-06-12 DIAGNOSIS — O26.03 EXCESSIVE WEIGHT GAIN DURING PREGNANCY IN THIRD TRIMESTER: ICD-10-CM

## 2019-06-12 PROBLEM — Z34.82 MULTIGRAVIDA IN SECOND TRIMESTER: Status: RESOLVED | Noted: 2019-02-21 | Resolved: 2019-06-12

## 2019-06-12 PROBLEM — Z13.1 SCREENING FOR DIABETES MELLITUS: Status: RESOLVED | Noted: 2019-04-17 | Resolved: 2019-06-12

## 2019-06-12 LAB
BILIRUB BLD-MCNC: NEGATIVE MG/DL
CLARITY, POC: CLEAR
COLOR UR: YELLOW
GLUCOSE UR STRIP-MCNC: NEGATIVE MG/DL
KETONES UR QL: ABNORMAL
LEUKOCYTE EST, POC: ABNORMAL
NITRITE UR-MCNC: NEGATIVE MG/ML
PH UR: 7 [PH] (ref 5–8)
PROT UR STRIP-MCNC: ABNORMAL MG/DL
RBC # UR STRIP: NEGATIVE /UL
SP GR UR: 1.02 (ref 1–1.03)
UROBILINOGEN UR QL: ABNORMAL

## 2019-06-12 PROCEDURE — 76816 OB US FOLLOW-UP PER FETUS: CPT | Performed by: OBSTETRICS & GYNECOLOGY

## 2019-06-12 PROCEDURE — 99214 OFFICE O/P EST MOD 30 MIN: CPT | Performed by: OBSTETRICS & GYNECOLOGY

## 2019-06-12 NOTE — PROGRESS NOTES
Chief complaint: Prenatal visit  History of present illness: Patient is here for her routine preop visit.  She reports that there is a strong odor to her urine.  She denies dysuria or hematuria.  She reports good fetal movement.  She is otherwise without major complaints.  No vaginal bleeding or leakage of fluid.  Objective: See vital signs and flowsheet  General: No acute distress, awake and oriented x3  Abdomen: Soft, nontender, gravid, fetal heart tones 167  Extremities: No lower extremity edema, no tenderness  Psychiatric: Good judgment insight, normal affect and mood  Ultrasound today: Estimated fetal weight 4 pounds 0 ounces or the 58th percentile.  Amniotic fluid index 14 cm.  Breech presentation  Office-based urinalysis today: Negative glucose, bilirubin, blood, nitrates; a large amount of leukocytes, 30 mg of protein, trace ketones, specific gravity 1.02, pH 7.0  Assessment:  1.  18-year-old  2 para 1 at 31-0/7 weeks gestational age  2.  Breech presentation  3.  Obesity in pregnancy  4.  Excess weight gain in pregnancy  Plan: 1.  Ultrasound findings were discussed with the patient.  Limitations of ultrasound were explained.  We discussed the significance of a breech presentation.  There is still a high likelihood of conversion to a cephalic presentation prior to term.  We will plan to recheck position in 4 weeks by ultrasound  2.  I feel the patient's complaint of malodorous urine is likely related to hemoconcentration of the urine from relative dehydration.  Advised the patient to increase oral hydration, especially with water.  Her urinalysis in the office today is really inconclusive and likely contaminated.  We will send cultures  [facility] return to the office in 2 weeks in 4 weeks.  She will see Dr. Velázquez in 4 weeks.

## 2019-06-14 ENCOUNTER — TELEPHONE (OUTPATIENT)
Dept: OBSTETRICS AND GYNECOLOGY | Facility: CLINIC | Age: 19
End: 2019-06-14

## 2019-06-14 LAB
BACTERIA UR CULT: NORMAL
BACTERIA UR CULT: NORMAL

## 2019-06-14 NOTE — TELEPHONE ENCOUNTER
Pt aware. PAXTON      ----- Message from Darin Casas MD sent at 6/14/2019 11:08 AM EDT -----  Notify the patient that her urine culture was negative

## 2019-06-26 ENCOUNTER — ROUTINE PRENATAL (OUTPATIENT)
Dept: OBSTETRICS AND GYNECOLOGY | Facility: CLINIC | Age: 19
End: 2019-06-26

## 2019-06-26 VITALS — WEIGHT: 214 LBS | BODY MASS INDEX: 35.61 KG/M2 | DIASTOLIC BLOOD PRESSURE: 60 MMHG | SYSTOLIC BLOOD PRESSURE: 110 MMHG

## 2019-06-26 DIAGNOSIS — O26.03 EXCESSIVE WEIGHT GAIN DURING PREGNANCY IN THIRD TRIMESTER: Primary | ICD-10-CM

## 2019-06-26 PROCEDURE — 99213 OFFICE O/P EST LOW 20 MIN: CPT | Performed by: OBSTETRICS & GYNECOLOGY

## 2019-06-26 NOTE — PROGRESS NOTES
Chief complaint: Prenatal visit  History of present illness: Patient is here for her routine prenatal visit.  She has no major physical complaints today.  She does report some pain in her hips when she is walking.  She is otherwise doing well.  Good fetal movement.  No vaginal bleeding or leakage of fluid.  Objective: See vital signs and flowsheet  General: No acute distress, awake and oriented x3  Abdomen: Soft, nontender, gravid, no fundal tenderness, fetal heart tones 140, likely still feels breech on exam today  Psychiatric: Good judgment and insight, normal affect and mood  Extremities: No lower extremity edema, no tenderness  Assessment:  1.  18-year-old  2 para 1 at 33-0/7 weeks gestational age  2.  Breech presentation  Plan:  1.  We will plan repeat ultrasound for presentation in 2 weeks.  Patient will see Dr. Velázquez at that time.  Discussed options for either primary  section for persistent breech versus trial of external cephalic version.  She can discuss this further with Dr. Velázquez if the baby is still breech next visit.

## 2019-06-30 ENCOUNTER — HOSPITAL ENCOUNTER (OUTPATIENT)
Facility: HOSPITAL | Age: 19
Setting detail: OBSERVATION
Discharge: HOME | End: 2019-07-01
Attending: OBSTETRICS & GYNECOLOGY | Admitting: OBSTETRICS & GYNECOLOGY

## 2019-06-30 PROBLEM — Z34.90 PREGNANCY: Status: ACTIVE | Noted: 2019-06-30

## 2019-06-30 LAB
BILIRUB UR QL STRIP: NEGATIVE
CLARITY UR: CLEAR
COLOR UR: YELLOW
GLUCOSE UR STRIP-MCNC: NEGATIVE MG/DL
HGB UR QL STRIP.AUTO: NEGATIVE
KETONES UR QL STRIP: ABNORMAL
LEUKOCYTE ESTERASE UR QL STRIP.AUTO: NEGATIVE
NITRITE UR QL STRIP: NEGATIVE
PH UR STRIP.AUTO: 6 [PH] (ref 5–8)
PROT UR QL STRIP: NEGATIVE
SP GR UR STRIP: >=1.03 (ref 1–1.03)
UROBILINOGEN UR QL STRIP: ABNORMAL

## 2019-06-30 PROCEDURE — 81003 URINALYSIS AUTO W/O SCOPE: CPT | Performed by: OBSTETRICS & GYNECOLOGY

## 2019-06-30 PROCEDURE — G0378 HOSPITAL OBSERVATION PER HR: HCPCS

## 2019-06-30 RX ORDER — ACETAMINOPHEN 500 MG
500 TABLET ORAL EVERY 6 HOURS PRN
COMMUNITY
End: 2020-12-04

## 2019-06-30 RX ORDER — ACETAMINOPHEN 325 MG/1
650 TABLET ORAL ONCE
Status: COMPLETED | OUTPATIENT
Start: 2019-07-01 | End: 2019-06-30

## 2019-06-30 RX ADMIN — ACETAMINOPHEN 650 MG: 325 TABLET, FILM COATED ORAL at 23:19

## 2019-07-01 VITALS
HEIGHT: 66 IN | BODY MASS INDEX: 34.39 KG/M2 | HEART RATE: 107 BPM | TEMPERATURE: 98.7 F | SYSTOLIC BLOOD PRESSURE: 118 MMHG | DIASTOLIC BLOOD PRESSURE: 66 MMHG | WEIGHT: 214 LBS | RESPIRATION RATE: 18 BRPM

## 2019-07-01 PROCEDURE — 59025 FETAL NON-STRESS TEST: CPT | Performed by: OBSTETRICS & GYNECOLOGY

## 2019-07-01 PROCEDURE — 59025 FETAL NON-STRESS TEST: CPT

## 2019-07-01 NOTE — NON STRESS TEST
Terri Gunter, a  at 33w5d with an SORAYA of 2019, by Last Menstrual Period, was seen at [facility] for a nonstress test.    Chief Complaint   Patient presents with   • Back Pain     Pt has had low back pain since awaking this morning at 0700;  has persisted all day and describes as throbbing and treated with Tylenol but it did not help; +fm, denies bleeding or leaking of fluid       Patient Active Problem List   Diagnosis   • Nausea and vomiting in pregnancy   • Asthma affecting pregnancy in first trimester   • Excessive weight gain during pregnancy in third trimester   • Maternal care for breech presentation, single gestation   • Breech presentation   • Pregnancy       Start Time: Stop Time: 0010  Interpretation A  Nonstress Test Interpretation A: Reactive (19 0019 : Zeina Glez, RN)

## 2019-07-01 NOTE — NURSING NOTE
"RN at bs to assess pts pain level.  Pt states that it \"went down a notch.\"  Rn clarifies that means a 7/10.  Pt smiles and is pleasant during conversation with RN and does not appear to be in any physical distress.  Urinalysis is negative other than trace ketones.  Pt has been drinking water since arriving and is tolerating.   "

## 2019-07-01 NOTE — NURSING NOTE
Discussed treatment for low back pain.  If pt does not feel any better after 24 hours of Tylenol and intermittent heat and ice therapy, then pt should call Dr. Riojas on Tuesday to discuss earlier fu.  Also discussed performing fetal kick counts if needed.  Pt understands and is agreeable.  Pt requests work excuse for today and one is provided.

## 2019-07-10 ENCOUNTER — PROCEDURE VISIT (OUTPATIENT)
Dept: OBSTETRICS AND GYNECOLOGY | Facility: CLINIC | Age: 19
End: 2019-07-10

## 2019-07-10 ENCOUNTER — ROUTINE PRENATAL (OUTPATIENT)
Dept: OBSTETRICS AND GYNECOLOGY | Facility: CLINIC | Age: 19
End: 2019-07-10

## 2019-07-10 VITALS — DIASTOLIC BLOOD PRESSURE: 77 MMHG | SYSTOLIC BLOOD PRESSURE: 121 MMHG | BODY MASS INDEX: 35.02 KG/M2 | WEIGHT: 217 LBS

## 2019-07-10 DIAGNOSIS — Z3A.35 35 WEEKS GESTATION OF PREGNANCY: Primary | ICD-10-CM

## 2019-07-10 PROBLEM — O21.9 NAUSEA AND VOMITING IN PREGNANCY: Status: RESOLVED | Noted: 2018-12-11 | Resolved: 2019-07-10

## 2019-07-10 PROBLEM — Z34.90 PREGNANCY: Status: RESOLVED | Noted: 2019-06-30 | Resolved: 2019-07-10

## 2019-07-10 PROCEDURE — 76815 OB US LIMITED FETUS(S): CPT | Performed by: OBSTETRICS & GYNECOLOGY

## 2019-07-10 PROCEDURE — 99213 OFFICE O/P EST LOW 20 MIN: CPT | Performed by: OBSTETRICS & GYNECOLOGY

## 2019-07-10 NOTE — PROGRESS NOTES
CC:  Pregnancy  Patient has no complaints today.  Ultrasound today does show the baby is now on the vertex presentation.  Patient denies any contractions.  GBS was collected today.  Patient will follow-up weekly.  A/P: Supervision of normal pregnancy at 35 weeks  --Follow-up weekly  Counseling was given to patient for the following topics: instructions for management and patient and family education . Total time of the encounter was 15 minutes and 10 minutes was spend counseling.

## 2019-07-14 LAB — B-HEM STREP SPEC QL CULT: NEGATIVE

## 2019-07-17 ENCOUNTER — ROUTINE PRENATAL (OUTPATIENT)
Dept: OBSTETRICS AND GYNECOLOGY | Facility: CLINIC | Age: 19
End: 2019-07-17

## 2019-07-17 VITALS — DIASTOLIC BLOOD PRESSURE: 84 MMHG | SYSTOLIC BLOOD PRESSURE: 131 MMHG | BODY MASS INDEX: 34.06 KG/M2 | WEIGHT: 211 LBS

## 2019-07-17 DIAGNOSIS — Z34.83 MULTIGRAVIDA IN THIRD TRIMESTER: Primary | ICD-10-CM

## 2019-07-17 PROCEDURE — 99213 OFFICE O/P EST LOW 20 MIN: CPT | Performed by: OBSTETRICS & GYNECOLOGY

## 2019-07-17 NOTE — PROGRESS NOTES
CC:  Pregnancy  Pt c/o pressure.  Patient does feel some contractions.  Reviewed her negative GBS culture.  Patient reports good fetal movement.  A/P:  Supervision of normal pregnancy at 36 weeks  --Followup weekly  Counseling was given to patient for the following topics: diagnostic results, instructions for management and patient and family education . Total time of the encounter was 15 minutes and 10 minutes was spend counseling.

## 2019-07-23 ENCOUNTER — ROUTINE PRENATAL (OUTPATIENT)
Dept: OBSTETRICS AND GYNECOLOGY | Facility: CLINIC | Age: 19
End: 2019-07-23

## 2019-07-23 VITALS — WEIGHT: 210 LBS | DIASTOLIC BLOOD PRESSURE: 69 MMHG | BODY MASS INDEX: 33.89 KG/M2 | SYSTOLIC BLOOD PRESSURE: 112 MMHG

## 2019-07-23 DIAGNOSIS — Z34.83 MULTIGRAVIDA IN THIRD TRIMESTER: Primary | ICD-10-CM

## 2019-07-23 PROCEDURE — 99213 OFFICE O/P EST LOW 20 MIN: CPT | Performed by: OBSTETRICS & GYNECOLOGY

## 2019-07-23 NOTE — PROGRESS NOTES
CC:  Pregnancy  Pt c/o contractions.  Patient feels they have increased since last week, but are still mild and irregular.  Patient reports good fetal movement.  Discussed labor precautions with patient and when to go to the hospital.  A/P: Supervision of normal pregnancy at 36 weeks  --Follow-up in 1 week  Counseling was given to patient for the following topics: instructions for management and patient and family education . Total time of the encounter was 15 minutes and 10 minutes was spend counseling.

## 2019-07-29 ENCOUNTER — HOSPITAL ENCOUNTER (OUTPATIENT)
Facility: HOSPITAL | Age: 19
Setting detail: OBSERVATION
Discharge: HOME | End: 2019-07-29
Attending: OBSTETRICS & GYNECOLOGY | Admitting: OBSTETRICS & GYNECOLOGY

## 2019-07-29 VITALS
DIASTOLIC BLOOD PRESSURE: 61 MMHG | OXYGEN SATURATION: 99 % | TEMPERATURE: 98.3 F | HEART RATE: 99 BPM | HEIGHT: 65 IN | SYSTOLIC BLOOD PRESSURE: 112 MMHG | RESPIRATION RATE: 18 BRPM | BODY MASS INDEX: 34.95 KG/M2

## 2019-07-29 PROBLEM — Z34.90 PREGNANCY: Status: ACTIVE | Noted: 2019-07-29

## 2019-07-29 LAB
BACTERIA UR QL AUTO: ABNORMAL /HPF
BILIRUB UR QL STRIP: NEGATIVE
CLARITY UR: CLEAR
COLOR UR: YELLOW
GLUCOSE UR STRIP-MCNC: NEGATIVE MG/DL
HGB UR QL STRIP.AUTO: NEGATIVE
HYALINE CASTS UR QL AUTO: ABNORMAL /LPF
KETONES UR QL STRIP: ABNORMAL
LEUKOCYTE ESTERASE UR QL STRIP.AUTO: ABNORMAL
NITRITE UR QL STRIP: NEGATIVE
PH UR STRIP.AUTO: 6.5 [PH] (ref 5–8)
PROT UR QL STRIP: ABNORMAL
RBC # UR: ABNORMAL /HPF
REF LAB TEST METHOD: ABNORMAL
SP GR UR STRIP: 1.02 (ref 1–1.03)
SQUAMOUS #/AREA URNS HPF: ABNORMAL /HPF
UROBILINOGEN UR QL STRIP: ABNORMAL
WBC UR QL AUTO: ABNORMAL /HPF

## 2019-07-29 PROCEDURE — 59025 FETAL NON-STRESS TEST: CPT

## 2019-07-29 PROCEDURE — G0378 HOSPITAL OBSERVATION PER HR: HCPCS

## 2019-07-29 PROCEDURE — 87086 URINE CULTURE/COLONY COUNT: CPT | Performed by: OBSTETRICS & GYNECOLOGY

## 2019-07-29 PROCEDURE — 81001 URINALYSIS AUTO W/SCOPE: CPT | Performed by: OBSTETRICS & GYNECOLOGY

## 2019-07-29 PROCEDURE — 59025 FETAL NON-STRESS TEST: CPT | Performed by: OBSTETRICS & GYNECOLOGY

## 2019-07-30 LAB — BACTERIA SPEC AEROBE CULT: NO GROWTH

## 2019-07-31 ENCOUNTER — ROUTINE PRENATAL (OUTPATIENT)
Dept: OBSTETRICS AND GYNECOLOGY | Facility: CLINIC | Age: 19
End: 2019-07-31

## 2019-07-31 VITALS — SYSTOLIC BLOOD PRESSURE: 118 MMHG | BODY MASS INDEX: 35.11 KG/M2 | WEIGHT: 211 LBS | DIASTOLIC BLOOD PRESSURE: 71 MMHG

## 2019-07-31 DIAGNOSIS — Z34.83 MULTIGRAVIDA IN THIRD TRIMESTER: Primary | ICD-10-CM

## 2019-07-31 PROCEDURE — 99213 OFFICE O/P EST LOW 20 MIN: CPT | Performed by: OBSTETRICS & GYNECOLOGY

## 2019-07-31 NOTE — PROGRESS NOTES
CC:  Pregnancy  Pt c/o lower back pain.  She was advised to use Tylenol, heat, or massage.  Reassurance was given.  Patient denies any signs of labor.  If patient is undelivered next week, we will plan an induction of labor on August 10.  Labor precautions were reviewed.  Patient reports good fetal movement.  A/P:  Supervision of normal pregnancy at 38 weeks  --Followup in 1 week  Counseling was given to patient for the following topics: instructions for management and patient and family education . Total time of the encounter was 15 minutes and 10 minutes was spend counseling.

## 2019-08-07 ENCOUNTER — ROUTINE PRENATAL (OUTPATIENT)
Dept: OBSTETRICS AND GYNECOLOGY | Facility: CLINIC | Age: 19
End: 2019-08-07

## 2019-08-07 VITALS — BODY MASS INDEX: 34.78 KG/M2 | SYSTOLIC BLOOD PRESSURE: 114 MMHG | DIASTOLIC BLOOD PRESSURE: 75 MMHG | WEIGHT: 209 LBS

## 2019-08-07 DIAGNOSIS — O99.210 MATERNAL OBESITY AFFECTING PREGNANCY, ANTEPARTUM: Primary | ICD-10-CM

## 2019-08-07 DIAGNOSIS — Z3A.39 39 WEEKS GESTATION OF PREGNANCY: ICD-10-CM

## 2019-08-07 PROCEDURE — 99213 OFFICE O/P EST LOW 20 MIN: CPT | Performed by: OBSTETRICS & GYNECOLOGY

## 2019-08-07 NOTE — PROGRESS NOTES
CC:  Pregnancy  Patient has no complaints.  She denies any contractions, bleeding, or leaking of fluid.  Patient is electing for induction this weekend.  She was counseled on the induction process along with risks and benefits.  Patient will be a Pitocin induction.  Labor precautions were reviewed and she was advised to go to labor and delivery sooner if labor starts.  A/P:  Supervision of pregnancy at 39 weeks with maternal obesity  --Schedule IOL this Saturday  Counseling was given to patient for the following topics: instructions for management, patient and family education and risks and benefits of treatment options . Total time of the encounter was 15 minutes and 10 minutes was spend counseling.

## 2019-08-09 DIAGNOSIS — O99.210 MATERNAL OBESITY AFFECTING PREGNANCY, ANTEPARTUM: Primary | ICD-10-CM

## 2019-08-09 RX ORDER — SODIUM CHLORIDE 0.9 % (FLUSH) 0.9 %
3 SYRINGE (ML) INJECTION EVERY 12 HOURS SCHEDULED
Status: CANCELLED | OUTPATIENT
Start: 2019-08-09

## 2019-08-09 RX ORDER — OXYTOCIN 10 [USP'U]/ML
250 INJECTION, SOLUTION INTRAMUSCULAR; INTRAVENOUS CONTINUOUS
Status: CANCELLED | OUTPATIENT
Start: 2019-08-09 | End: 2019-08-09

## 2019-08-09 RX ORDER — LIDOCAINE HYDROCHLORIDE 10 MG/ML
5 INJECTION, SOLUTION EPIDURAL; INFILTRATION; INTRACAUDAL; PERINEURAL AS NEEDED
Status: CANCELLED | OUTPATIENT
Start: 2019-08-09

## 2019-08-09 RX ORDER — OXYTOCIN 10 [USP'U]/ML
2-30 INJECTION, SOLUTION INTRAMUSCULAR; INTRAVENOUS
Status: CANCELLED | OUTPATIENT
Start: 2019-08-09

## 2019-08-09 RX ORDER — ACETAMINOPHEN 325 MG/1
650 TABLET ORAL EVERY 4 HOURS PRN
Status: CANCELLED | OUTPATIENT
Start: 2019-08-09

## 2019-08-09 RX ORDER — SODIUM CHLORIDE, SODIUM LACTATE, POTASSIUM CHLORIDE, CALCIUM CHLORIDE 600; 310; 30; 20 MG/100ML; MG/100ML; MG/100ML; MG/100ML
125 INJECTION, SOLUTION INTRAVENOUS CONTINUOUS
Status: CANCELLED | OUTPATIENT
Start: 2019-08-09

## 2019-08-09 RX ORDER — OXYTOCIN 10 [USP'U]/ML
125 INJECTION, SOLUTION INTRAMUSCULAR; INTRAVENOUS CONTINUOUS PRN
Status: CANCELLED | OUTPATIENT
Start: 2019-08-09

## 2019-08-09 RX ORDER — CARBOPROST TROMETHAMINE 250 UG/ML
250 INJECTION, SOLUTION INTRAMUSCULAR AS NEEDED
Status: CANCELLED | OUTPATIENT
Start: 2019-08-09

## 2019-08-09 RX ORDER — MISOPROSTOL 100 UG/1
800 TABLET ORAL AS NEEDED
Status: CANCELLED | OUTPATIENT
Start: 2019-08-09

## 2019-08-09 RX ORDER — METHYLERGONOVINE MALEATE 0.2 MG/ML
200 INJECTION INTRAVENOUS ONCE AS NEEDED
Status: CANCELLED | OUTPATIENT
Start: 2019-08-09

## 2019-08-09 RX ORDER — OXYTOCIN 10 [USP'U]/ML
999 INJECTION, SOLUTION INTRAMUSCULAR; INTRAVENOUS ONCE
Status: CANCELLED | OUTPATIENT
Start: 2019-08-09

## 2019-08-09 RX ORDER — SODIUM CHLORIDE 0.9 % (FLUSH) 0.9 %
3-10 SYRINGE (ML) INJECTION AS NEEDED
Status: CANCELLED | OUTPATIENT
Start: 2019-08-09

## 2019-08-10 ENCOUNTER — ANESTHESIA EVENT (OUTPATIENT)
Dept: LABOR AND DELIVERY | Facility: HOSPITAL | Age: 19
End: 2019-08-10

## 2019-08-10 ENCOUNTER — HOSPITAL ENCOUNTER (OUTPATIENT)
Dept: LABOR AND DELIVERY | Facility: HOSPITAL | Age: 19
Discharge: HOME | End: 2019-08-10

## 2019-08-10 ENCOUNTER — HOSPITAL ENCOUNTER (INPATIENT)
Facility: HOSPITAL | Age: 19
LOS: 3 days | Discharge: HOME | End: 2019-08-13
Attending: OBSTETRICS & GYNECOLOGY | Admitting: OBSTETRICS & GYNECOLOGY

## 2019-08-10 ENCOUNTER — ANESTHESIA (OUTPATIENT)
Dept: LABOR AND DELIVERY | Facility: HOSPITAL | Age: 19
End: 2019-08-10

## 2019-08-10 DIAGNOSIS — O99.210 MATERNAL OBESITY AFFECTING PREGNANCY, ANTEPARTUM: ICD-10-CM

## 2019-08-10 LAB
ABO GROUP BLD: NORMAL
BLD GP AB SCN SERPL QL: NEGATIVE
DEPRECATED RDW RBC AUTO: 43.8 FL (ref 37–54)
ERYTHROCYTE [DISTWIDTH] IN BLOOD BY AUTOMATED COUNT: 12.6 % (ref 12.3–15.4)
HCT VFR BLD AUTO: 33.7 % (ref 34–46.6)
HGB BLD-MCNC: 11.2 G/DL (ref 12–15.9)
MCH RBC QN AUTO: 31.5 PG (ref 26.6–33)
MCHC RBC AUTO-ENTMCNC: 33.2 G/DL (ref 31.5–35.7)
MCV RBC AUTO: 94.9 FL (ref 79–97)
PLATELET # BLD AUTO: 164 10*3/MM3 (ref 140–450)
PMV BLD AUTO: 10.7 FL (ref 6–12)
RBC # BLD AUTO: 3.55 10*6/MM3 (ref 3.77–5.28)
RH BLD: POSITIVE
T&S EXPIRATION DATE: NORMAL
WBC NRBC COR # BLD: 8.45 10*3/MM3 (ref 3.4–10.8)

## 2019-08-10 PROCEDURE — S0260 H&P FOR SURGERY: HCPCS | Performed by: OBSTETRICS & GYNECOLOGY

## 2019-08-10 PROCEDURE — 3E033VJ INTRODUCTION OF OTHER HORMONE INTO PERIPHERAL VEIN, PERCUTANEOUS APPROACH: ICD-10-PCS | Performed by: OBSTETRICS & GYNECOLOGY

## 2019-08-10 PROCEDURE — 86901 BLOOD TYPING SEROLOGIC RH(D): CPT | Performed by: OBSTETRICS & GYNECOLOGY

## 2019-08-10 PROCEDURE — 85027 COMPLETE CBC AUTOMATED: CPT | Performed by: OBSTETRICS & GYNECOLOGY

## 2019-08-10 PROCEDURE — C1755 CATHETER, INTRASPINAL: HCPCS | Performed by: ANESTHESIOLOGY

## 2019-08-10 PROCEDURE — 86900 BLOOD TYPING SEROLOGIC ABO: CPT | Performed by: OBSTETRICS & GYNECOLOGY

## 2019-08-10 PROCEDURE — 86850 RBC ANTIBODY SCREEN: CPT | Performed by: OBSTETRICS & GYNECOLOGY

## 2019-08-10 RX ORDER — SODIUM CHLORIDE 0.9 % (FLUSH) 0.9 %
3 SYRINGE (ML) INJECTION EVERY 12 HOURS SCHEDULED
Status: DISCONTINUED | OUTPATIENT
Start: 2019-08-10 | End: 2019-08-11 | Stop reason: HOSPADM

## 2019-08-10 RX ORDER — LIDOCAINE HYDROCHLORIDE AND EPINEPHRINE 15; 5 MG/ML; UG/ML
INJECTION, SOLUTION EPIDURAL AS NEEDED
Status: DISCONTINUED | OUTPATIENT
Start: 2019-08-10 | End: 2019-08-11 | Stop reason: SURG

## 2019-08-10 RX ORDER — DIPHENHYDRAMINE HYDROCHLORIDE 50 MG/ML
12.5 INJECTION INTRAMUSCULAR; INTRAVENOUS EVERY 8 HOURS PRN
Status: DISCONTINUED | OUTPATIENT
Start: 2019-08-10 | End: 2019-08-11 | Stop reason: HOSPADM

## 2019-08-10 RX ORDER — FAMOTIDINE 10 MG/ML
20 INJECTION, SOLUTION INTRAVENOUS ONCE AS NEEDED
Status: DISCONTINUED | OUTPATIENT
Start: 2019-08-10 | End: 2019-08-11 | Stop reason: HOSPADM

## 2019-08-10 RX ORDER — LIDOCAINE HYDROCHLORIDE 10 MG/ML
5 INJECTION, SOLUTION EPIDURAL; INFILTRATION; INTRACAUDAL; PERINEURAL AS NEEDED
Status: DISCONTINUED | OUTPATIENT
Start: 2019-08-10 | End: 2019-08-11 | Stop reason: HOSPADM

## 2019-08-10 RX ORDER — SODIUM CHLORIDE 0.9 % (FLUSH) 0.9 %
3-10 SYRINGE (ML) INJECTION AS NEEDED
Status: DISCONTINUED | OUTPATIENT
Start: 2019-08-10 | End: 2019-08-11 | Stop reason: HOSPADM

## 2019-08-10 RX ORDER — OXYTOCIN-SODIUM CHLORIDE 0.9% IV SOLN 30 UNIT/500ML 30-0.9/5 UT/ML-%
2-30 SOLUTION INTRAVENOUS
Status: DISCONTINUED | OUTPATIENT
Start: 2019-08-10 | End: 2019-08-11 | Stop reason: HOSPADM

## 2019-08-10 RX ORDER — ONDANSETRON 2 MG/ML
4 INJECTION INTRAMUSCULAR; INTRAVENOUS ONCE AS NEEDED
Status: DISCONTINUED | OUTPATIENT
Start: 2019-08-10 | End: 2019-08-11 | Stop reason: HOSPADM

## 2019-08-10 RX ORDER — SODIUM CHLORIDE 9 MG/ML
100 INJECTION, SOLUTION INTRAVENOUS CONTINUOUS
Status: DISCONTINUED | OUTPATIENT
Start: 2019-08-11 | End: 2019-08-11

## 2019-08-10 RX ORDER — SODIUM CHLORIDE, SODIUM LACTATE, POTASSIUM CHLORIDE, CALCIUM CHLORIDE 600; 310; 30; 20 MG/100ML; MG/100ML; MG/100ML; MG/100ML
125 INJECTION, SOLUTION INTRAVENOUS CONTINUOUS
Status: DISCONTINUED | OUTPATIENT
Start: 2019-08-10 | End: 2019-08-11

## 2019-08-10 RX ORDER — EPHEDRINE SULFATE 50 MG/ML
5 INJECTION, SOLUTION INTRAVENOUS AS NEEDED
Status: DISCONTINUED | OUTPATIENT
Start: 2019-08-10 | End: 2019-08-11 | Stop reason: HOSPADM

## 2019-08-10 RX ORDER — ACETAMINOPHEN 325 MG/1
650 TABLET ORAL EVERY 4 HOURS PRN
Status: DISCONTINUED | OUTPATIENT
Start: 2019-08-10 | End: 2019-08-11 | Stop reason: HOSPADM

## 2019-08-10 RX ADMIN — EPHEDRINE SULFATE 5 MG: 50 INJECTION, SOLUTION INTRAVENOUS at 20:30

## 2019-08-10 RX ADMIN — OXYTOCIN 2 MILLI-UNITS/MIN: 10 INJECTION INTRAVENOUS at 08:30

## 2019-08-10 RX ADMIN — LIDOCAINE HYDROCHLORIDE AND EPINEPHRINE 3 ML: 15; 5 INJECTION, SOLUTION EPIDURAL at 13:00

## 2019-08-10 RX ADMIN — SODIUM CHLORIDE, POTASSIUM CHLORIDE, SODIUM LACTATE AND CALCIUM CHLORIDE 1000 ML: 600; 310; 30; 20 INJECTION, SOLUTION INTRAVENOUS at 20:13

## 2019-08-10 RX ADMIN — EPHEDRINE SULFATE 5 MG: 50 INJECTION, SOLUTION INTRAVENOUS at 13:15

## 2019-08-10 RX ADMIN — SODIUM CHLORIDE, POTASSIUM CHLORIDE, SODIUM LACTATE AND CALCIUM CHLORIDE 125 ML/HR: 600; 310; 30; 20 INJECTION, SOLUTION INTRAVENOUS at 16:15

## 2019-08-10 RX ADMIN — SODIUM CHLORIDE, POTASSIUM CHLORIDE, SODIUM LACTATE AND CALCIUM CHLORIDE 125 ML/HR: 600; 310; 30; 20 INJECTION, SOLUTION INTRAVENOUS at 08:00

## 2019-08-10 RX ADMIN — Medication 10 ML/HR: at 13:03

## 2019-08-10 NOTE — ANESTHESIA PROCEDURE NOTES
Labor Epidural      Patient reassessed immediately prior to procedure    Patient location during procedure: OB  Performed By  Anesthesiologist: Jarret Jackson MD  Preanesthetic Checklist  Completed: patient identified, surgical consent, pre-op evaluation, timeout performed, IV checked, risks and benefits discussed and monitors and equipment checked  Additional Notes  LMP 11/07/2018 (Exact Date)     Prep:  Pt Position:sitting  Sterile Tech:cap, gloves, mask and sterile barrier  Prep:chlorhexidine gluconate and isopropyl alcohol  Monitoring:blood pressure monitoring, continuous pulse oximetry and EKG  Epidural Block Procedure:  Approach:midline  Guidance:landmark technique and palpation technique  Location:L4-L5  Needle Type:Tuohy  Needle Gauge:17  Loss of Resistance Medium: saline  Loss of Resistance: 7cm  Cath Depth at skin:13 cm  Paresthesia: none  Aspiration:negative  Test Dose:negative  Number of Attempts: 1  Post Assessment:  Dressing:occlusive dressing applied and secured with tape  Pt Tolerance:patient tolerated the procedure well with no apparent complications  Complications:no

## 2019-08-10 NOTE — H&P
[facility]  Obstetric History and Physical    Chief Complaint   Patient presents with   • Scheduled Induction     elective iduction for term +FM -VB ot LOF        Subjective     Patient is a 18 y.o. female  currently at 39w3d, who presents for elective IOL.  Pregnancy was complicated by maternal obesity.      The following portions of the patients history were reviewed and updated as appropriate: current medications, allergies, past medical history, past surgical history, past family history, past social history and problem list .       Prenatal Information:  Prenatal Results     Initial Prenatal Labs     Test Value Reference Range Date Time    Hemoglobin 11.3 g/dL 12.0 - 16.0 g/dL 19 1211    Hematocrit 33.0 % 36.0 - 46.0 % 19 1211    Platelets 164 10*3/mm3 140 - 450 10*3/mm3 08/10/19 0804    Rubella IgG 3.16 index Immune >0.99 index 18 1516    Hepatitis B SAg Negative  Negative 18 1516    Hepatitis C Ab 0.2 s/co ratio 0.0 - 0.9 s/co ratio 18 1516    RPR Non Reactive  Non Reactive 18 1516    ABO O   08/10/19 0804    Rh Positive   08/10/19 0804    Antibody Screen Negative  Negative 18 1516    HIV Non Reactive  Non Reactive 18 1516    Urine Culture No growth   19 1709    Gonorrhea Negative  Negative 19 0950    Chlamydia Negative  Negative 19 0950    TSH              2nd and 3rd Trimester     Test Value Reference Range Date Time    Hemoglobin (repeated) 11.2 g/dL 12.0 - 15.9 g/dL 08/10/19 0804    Hematocrit (repeated) 33.7 % 34.0 - 46.6 % 08/10/19 0804     mg/dL 65 - 139 mg/dL 05/15/19 1512    Antibody Screen (repeated) Negative   08/10/19 0804    GTT Fasting        GTT 1 Hr 92   17     GTT 2 Hr        GTT 3 Hr        Group B Strep Negative  Negative 07/10/19 1522          Drug Screening     Test Value Reference Range Date Time    Amphetamine Screen        Barbiturate Screen        Benzodiazepine Screen        Methadone Screen         Phencyclidine Screen        Opiates Screen        THC Screen ++POSITIVE++ ng/mL Cutoff:5 ng/mL 12/11/18 1516    Cocaine Screen        Propoxyphene Screen        Buprenorphine Screen        Methamphetamine Screen        Oxycodone Screen        Tricyclic Antidepressants Screen              Other (Risk screening)     Test Value Reference Range Date Time    Varicella IgG        Parvovirus IgG        CMV IgG        Cystic Fibrosis        Hemoglobin electrophoresis        NIPT 46 XY   01/24/19     MSAFP-4        AFP (for NTD only)                  External Prenatal Results     Pregnancy Outside Results - Transcribed From Office Records - See Scanned Records For Details     Test Value Date Time    Hgb 11.2 g/dL 08/10/19 0804    Hct 33.7 % 08/10/19 0804    ABO O  08/10/19 0804    Rh Positive  08/10/19 0804    Antibody Screen Negative  08/10/19 0804    Glucose Fasting GTT       Glucose Tolerance Test 1 hour 92  06/13/17     Glucose Tolerance Test 3 hour       Gonorrhea (discrete) Negative  01/24/19 0950    Chlamydia (discrete) Negative  01/24/19 0950    RPR Non Reactive  12/11/18 1516    VDRL       Syphilis Antibody       Rubella 3.16 index 12/11/18 1516    HBsAg Negative  12/11/18 1516    Herpes Simplex Virus PCR       Herpes Simplex VIrus Culture       HIV Non Reactive  12/11/18 1516    Hep C RNA Quant PCR       Hep C Antibody 0.2 s/co ratio 12/11/18 1516    AFP       Group B Strep Negative  07/10/19 1522    GBS Susceptibility to Clindamycin       GBS Susceptibility to Erythromycin       Fetal Fibronectin       Genetic Testing, Maternal Blood             Drug Screening     Test Value Date Time    Urine Drug Screen       Amphetamine Screen       Barbiturate Screen Negative  09/16/17 1441    Benzodiazepine Screen Negative  09/16/17 1441    Methadone Screen Negative  09/16/17 1441    Phencyclidine Screen       Opiates Screen Negative  09/16/17 1441    THC Screen ++POSITIVE++ ng/mL 12/11/18 1516    Cocaine Screen        Propoxyphene Screen       Buprenorphine Screen       Methamphetamine Screen       Oxycodone Screen Negative  17 1441    Tricyclic Antidepressants Screen                    Past OB History:     Obstetric History       T1      L1     SAB0   TAB0   Ectopic0   Molar0   Multiple0   Live Births1       # Outcome Date GA Lbr Brain/2nd Weight Sex Delivery Anes PTL Lv   2 Current            1 Term 17 40w3d 21:50 / 01:35 3669 g (8 lb 1.4 oz) M Vag-Spont EPI N DUSTY      Name: SILVESTRE CHI      Apgar1:  8                Apgar5: 9          Past Medical History: Past Medical History:   Diagnosis Date   • Asthma     states has misplaced inhaler, hasn't used inhaler since 2  mo pregnant   • Multigravida in first trimester 2018      Past Surgical History Past Surgical History:   Procedure Laterality Date   • WISDOM TOOTH EXTRACTION        Family History: Family History   Problem Relation Age of Onset   • Diabetes Father       Social History:  reports that she has quit smoking. She has never used smokeless tobacco.   reports that she does not drink alcohol.   reports that she does not use drugs.        General ROS: Pertinent items are noted in HPI, all other systems reviewed and negative    Objective       Vital Signs Range for the last 24 hours  Temperature: Temp:  [98.4 °F (36.9 °C)] 98.4 °F (36.9 °C)   Temp Source: Temp src: Oral   BP: BP: ()/(46-70) 97/51   Pulse: Heart Rate:  [75-96] 87   Respirations:     SPO2: SpO2:  [99 %] 99 %   O2 Amount (l/min):     O2 Devices Device (Oxygen Therapy): room air   Weight: Weight:  [94.8 kg (209 lb)] 94.8 kg (209 lb)     Physical Examination: General appearance - alert, well appearing, and in no distress  Abdomen - gravid, nontender, EFW 7.5#  Pelvic - Cervix 2cm/50%/-2  AROM performed with return of clear fluid.  IUPC placed without difficulty.  Extremities - pedal edema 1 +    Presentation: vertex   Cervix: Exam by: Method: sterile exam per RN    Dilation: Cervical Dilation (cm): 1-2   Effacement: Cervical Effacement: 50%   Station:         Fetal Heart Rate Assessment   Method: Fetal HR Assessment Method: external   Beats/min: Fetal HR (beats/min): 135   Baseline: Fetal Heart Baseline Rate: normal range(periods of moderate )   Variability: Fetal HR Variability: minimal (detectable, amplitude less than or equal to 5 bpm)   Accels: Fetal HR Accelerations: greater than/equal to 15 bpm, lasting at least 15 seconds   Decels: Fetal HR Decelerations: absent   Tracing Category:       Uterine Assessment   Method: Method: palpation, external tocotransducer   Frequency (min): Contraction Frequency (Minutes): 2-3   Ctx Count in 10 min: Contractions in 10 Minutes: 5   Duration:     Intensity: Contraction Intensity: mild by palpation   Intensity by IUPC:     Resting Tone: Uterine Resting Tone: soft by palpation   Resting Tone by IUPC:     Humberto Units:         Assessment/Plan       Maternal obesity affecting pregnancy, antepartum        Assessment:  1.  Intrauterine pregnancy at 39w3d gestation with reassuring fetal status.    2.  induction of labor  for elective reasons  with favorable cervix  3.  Obstetrical history significant for maternal obesity.  4.  GBS status:   Strep Gp B Culture   Date Value Ref Range Status   07/10/2019 Negative Negative Final     Comment:     Centers for Disease Control and Prevention (CDC) and American Congress  of Obstetricians and Gynecologists (ACOG) guidelines for prevention of   group B streptococcal (GBS) disease specify co-collection of  a vaginal and rectal swab specimen to maximize sensitivity of GBS  detection. Per the CDC and ACOG, swabbing both the lower vagina and  rectum substantially increases the yield of detection compared with  sampling the vagina alone.  Penicillin G, ampicillin, or cefazolin are indicated for intrapartum  prophylaxis of  GBS colonization. Reflex susceptibility  testing should be  performed prior to use of clindamycin only on GBS  isolates from penicillin-allergic women who are considered a high risk  for anaphylaxis. Treatment with vancomycin without additional testing  is warranted if resistance to clindamycin is noted.         Plan:  1. fetal and uterine monitoring  continuously, labor augmentation  Pitocin and analgesia with  epidural  2. Plan of care has been reviewed with patient and significant other  3.  Risks, benefits of treatment plan have been discussed.  4.  All questions have been answered.      Emili Riojas MD  8/10/2019  1:22 PM

## 2019-08-10 NOTE — PLAN OF CARE
Problem: Patient Care Overview  Goal: Plan of Care Review  Outcome: Ongoing (interventions implemented as appropriate)   08/10/19 0298   Coping/Psychosocial   Plan of Care Reviewed With patient;spouse   Plan of Care Review   Progress improving   OTHER   Outcome Summary pt continues to make cervical change, Pitocin running and IUPC placed. Epidural currently infusing and pain is in good control. COntinue with plan for vaginal delivery at this time      Goal: Individualization and Mutuality  Outcome: Ongoing (interventions implemented as appropriate)      Problem: Fall Risk,  (Adult,Obstetrics,Pediatric)  Goal: Identify Related Risk Factors and Signs and Symptoms  Outcome: Ongoing (interventions implemented as appropriate)    Goal: Absence of Maternal Fall  Outcome: Ongoing (interventions implemented as appropriate)    Goal: Absence of [place] Fall/Drop  Outcome: Ongoing (interventions implemented as appropriate)      Problem: Anesthesia/Analgesia, Neuraxial (Obstetrics)  Goal: Signs and Symptoms of Listed Potential Problems Will be Absent, Minimized or Managed (Anesthesia/Analgesia, Neuraxial)  Outcome: Ongoing (interventions implemented as appropriate)      Problem: Labor (Cervical Ripen, Induct, Augment) (Adult,Obstetrics,Pediatric)  Goal: Signs and Symptoms of Listed Potential Problems Will be Absent, Minimized or Managed (Labor)  Outcome: Ongoing (interventions implemented as appropriate)

## 2019-08-10 NOTE — ANESTHESIA PREPROCEDURE EVALUATION
Anesthesia Evaluation                  Airway   Mallampati: II  TM distance: >3 FB  Neck ROM: full  no difficulty expected  Dental - normal exam     Pulmonary     breath sounds clear to auscultation  (+) asthma,   (-) decreased breath sounds, wheezes  Cardiovascular - normal exam    Rhythm: regular  Rate: normal        Neuro/Psych  GI/Hepatic/Renal/Endo    (+) obesity,       Musculoskeletal     Abdominal  - normal exam   Substance History      OB/GYN    (+) Pregnant (),         Other                        Anesthesia Plan    ASA 3     epidural     Anesthetic plan, all risks, benefits, and alternatives have been provided, discussed and informed consent has been obtained with: patient and spouse.

## 2019-08-11 PROCEDURE — C1755 CATHETER, INTRASPINAL: HCPCS

## 2019-08-11 PROCEDURE — 0KQM0ZZ REPAIR PERINEUM MUSCLE, OPEN APPROACH: ICD-10-PCS | Performed by: OBSTETRICS & GYNECOLOGY

## 2019-08-11 PROCEDURE — 6A550ZT PHERESIS OF CORD BLOOD STEM CELLS, SINGLE: ICD-10-PCS | Performed by: OBSTETRICS & GYNECOLOGY

## 2019-08-11 PROCEDURE — 99024 POSTOP FOLLOW-UP VISIT: CPT | Performed by: OBSTETRICS & GYNECOLOGY

## 2019-08-11 PROCEDURE — 59410 OBSTETRICAL CARE: CPT | Performed by: OBSTETRICS & GYNECOLOGY

## 2019-08-11 RX ORDER — OXYTOCIN-SODIUM CHLORIDE 0.9% IV SOLN 30 UNIT/500ML 30-0.9/5 UT/ML-%
125 SOLUTION INTRAVENOUS CONTINUOUS PRN
Status: COMPLETED | OUTPATIENT
Start: 2019-08-11 | End: 2019-08-11

## 2019-08-11 RX ORDER — IBUPROFEN 600 MG/1
600 TABLET ORAL EVERY 8 HOURS PRN
Status: DISCONTINUED | OUTPATIENT
Start: 2019-08-11 | End: 2019-08-13 | Stop reason: HOSPADM

## 2019-08-11 RX ORDER — METHYLERGONOVINE MALEATE 0.2 MG/ML
200 INJECTION INTRAVENOUS ONCE AS NEEDED
Status: DISCONTINUED | OUTPATIENT
Start: 2019-08-11 | End: 2019-08-11 | Stop reason: HOSPADM

## 2019-08-11 RX ORDER — OXYTOCIN-SODIUM CHLORIDE 0.9% IV SOLN 30 UNIT/500ML 30-0.9/5 UT/ML-%
250 SOLUTION INTRAVENOUS CONTINUOUS
Status: ACTIVE | OUTPATIENT
Start: 2019-08-11 | End: 2019-08-11

## 2019-08-11 RX ORDER — PHYTONADIONE 1 MG/.5ML
INJECTION, EMULSION INTRAMUSCULAR; INTRAVENOUS; SUBCUTANEOUS
Status: DISPENSED
Start: 2019-08-11 | End: 2019-08-11

## 2019-08-11 RX ORDER — PROMETHAZINE HYDROCHLORIDE 25 MG/1
25 TABLET ORAL EVERY 6 HOURS PRN
Status: DISCONTINUED | OUTPATIENT
Start: 2019-08-11 | End: 2019-08-13 | Stop reason: HOSPADM

## 2019-08-11 RX ORDER — BISACODYL 10 MG
10 SUPPOSITORY, RECTAL RECTAL DAILY PRN
Status: DISCONTINUED | OUTPATIENT
Start: 2019-08-12 | End: 2019-08-13 | Stop reason: HOSPADM

## 2019-08-11 RX ORDER — DOCUSATE SODIUM 100 MG/1
100 CAPSULE, LIQUID FILLED ORAL 2 TIMES DAILY PRN
Status: DISCONTINUED | OUTPATIENT
Start: 2019-08-11 | End: 2019-08-13 | Stop reason: HOSPADM

## 2019-08-11 RX ORDER — SODIUM CHLORIDE 0.9 % (FLUSH) 0.9 %
1-10 SYRINGE (ML) INJECTION AS NEEDED
Status: DISCONTINUED | OUTPATIENT
Start: 2019-08-11 | End: 2019-08-13 | Stop reason: HOSPADM

## 2019-08-11 RX ORDER — OXYTOCIN-SODIUM CHLORIDE 0.9% IV SOLN 30 UNIT/500ML 30-0.9/5 UT/ML-%
999 SOLUTION INTRAVENOUS ONCE
Status: COMPLETED | OUTPATIENT
Start: 2019-08-11 | End: 2019-08-11

## 2019-08-11 RX ORDER — MISOPROSTOL 200 UG/1
800 TABLET ORAL AS NEEDED
Status: DISCONTINUED | OUTPATIENT
Start: 2019-08-11 | End: 2019-08-11 | Stop reason: HOSPADM

## 2019-08-11 RX ORDER — PROMETHAZINE HYDROCHLORIDE 25 MG/ML
12.5 INJECTION, SOLUTION INTRAMUSCULAR; INTRAVENOUS EVERY 6 HOURS PRN
Status: DISCONTINUED | OUTPATIENT
Start: 2019-08-11 | End: 2019-08-13 | Stop reason: HOSPADM

## 2019-08-11 RX ORDER — PROMETHAZINE HYDROCHLORIDE 25 MG/1
12.5 SUPPOSITORY RECTAL EVERY 6 HOURS PRN
Status: DISCONTINUED | OUTPATIENT
Start: 2019-08-11 | End: 2019-08-13 | Stop reason: HOSPADM

## 2019-08-11 RX ORDER — CARBOPROST TROMETHAMINE 250 UG/ML
250 INJECTION, SOLUTION INTRAMUSCULAR AS NEEDED
Status: DISCONTINUED | OUTPATIENT
Start: 2019-08-11 | End: 2019-08-11 | Stop reason: HOSPADM

## 2019-08-11 RX ORDER — ERYTHROMYCIN 5 MG/G
OINTMENT OPHTHALMIC
Status: DISPENSED
Start: 2019-08-11 | End: 2019-08-11

## 2019-08-11 RX ORDER — PRENATAL VIT NO.126/IRON/FOLIC 28MG-0.8MG
1 TABLET ORAL DAILY
Status: DISCONTINUED | OUTPATIENT
Start: 2019-08-11 | End: 2019-08-13 | Stop reason: HOSPADM

## 2019-08-11 RX ORDER — HYDROCODONE BITARTRATE AND ACETAMINOPHEN 5; 325 MG/1; MG/1
1 TABLET ORAL EVERY 4 HOURS PRN
Status: DISCONTINUED | OUTPATIENT
Start: 2019-08-11 | End: 2019-08-13 | Stop reason: HOSPADM

## 2019-08-11 RX ORDER — LANOLIN
CREAM (ML) TOPICAL AS NEEDED
Status: DISCONTINUED | OUTPATIENT
Start: 2019-08-11 | End: 2019-08-13 | Stop reason: HOSPADM

## 2019-08-11 RX ADMIN — Medication: at 08:21

## 2019-08-11 RX ADMIN — IBUPROFEN 600 MG: 600 TABLET, FILM COATED ORAL at 11:33

## 2019-08-11 RX ADMIN — HYDROCODONE BITARTRATE AND ACETAMINOPHEN 1 TABLET: 5; 325 TABLET ORAL at 16:56

## 2019-08-11 RX ADMIN — IBUPROFEN 600 MG: 600 TABLET, FILM COATED ORAL at 02:19

## 2019-08-11 RX ADMIN — IBUPROFEN 600 MG: 600 TABLET, FILM COATED ORAL at 23:09

## 2019-08-11 RX ADMIN — HYDROCODONE BITARTRATE AND ACETAMINOPHEN 1 TABLET: 5; 325 TABLET ORAL at 11:33

## 2019-08-11 RX ADMIN — DOCUSATE SODIUM 100 MG: 100 CAPSULE, LIQUID FILLED ORAL at 08:20

## 2019-08-11 RX ADMIN — HYDROCODONE BITARTRATE AND ACETAMINOPHEN 1 TABLET: 5; 325 TABLET ORAL at 06:24

## 2019-08-11 RX ADMIN — HYDROCODONE BITARTRATE AND ACETAMINOPHEN 1 TABLET: 5; 325 TABLET ORAL at 02:19

## 2019-08-11 RX ADMIN — OXYTOCIN 999 ML/HR: 10 INJECTION INTRAVENOUS at 00:41

## 2019-08-11 RX ADMIN — OXYTOCIN 125 ML/HR: 10 INJECTION INTRAVENOUS at 02:06

## 2019-08-11 RX ADMIN — HYDROCODONE BITARTRATE AND ACETAMINOPHEN 1 TABLET: 5; 325 TABLET ORAL at 23:09

## 2019-08-11 RX ADMIN — Medication: at 09:45

## 2019-08-11 RX ADMIN — Medication 1 TABLET: at 08:20

## 2019-08-11 NOTE — PLAN OF CARE
Problem: Labor (Cervical Ripen, Induct, Augment) (Adult,Obstetrics,Pediatric)  Goal: Signs and Symptoms of Listed Potential Problems Will be Absent, Minimized or Managed (Labor)  Outcome: Ongoing (interventions implemented as appropriate)   08/11/19 0000   Goal/Outcome Evaluation   Problems Assessed (Labor) all   Problems Present (Labor) none

## 2019-08-11 NOTE — PLAN OF CARE
Problem: Patient Care Overview  Goal: Plan of Care Review  Outcome: Ongoing (interventions implemented as appropriate)    Goal: Individualization and Mutuality  Outcome: Ongoing (interventions implemented as appropriate)    Goal: Discharge Needs Assessment  Outcome: Ongoing (interventions implemented as appropriate)    Goal: Interprofessional Rounds/Family Conf  Outcome: Ongoing (interventions implemented as appropriate)      Problem: Fall Risk,  (Adult,Obstetrics,Pediatric)  Goal: Identify Related Risk Factors and Signs and Symptoms  Outcome: Ongoing (interventions implemented as appropriate)    Goal: Absence of Maternal Fall  Outcome: Ongoing (interventions implemented as appropriate)    Goal: Absence of [place] Fall/Drop  Outcome: Ongoing (interventions implemented as appropriate)      Problem: Anesthesia/Analgesia, Neuraxial (Obstetrics)  Goal: Signs and Symptoms of Listed Potential Problems Will be Absent, Minimized or Managed (Anesthesia/Analgesia, Neuraxial)  Outcome: Ongoing (interventions implemented as appropriate)      Problem: Skin Injury Risk (Adult)  Goal: Identify Related Risk Factors and Signs and Symptoms  Outcome: Ongoing (interventions implemented as appropriate)    Goal: Skin Health and Integrity  Outcome: Ongoing (interventions implemented as appropriate)      Problem: Postpartum (Vaginal Delivery) (Adult,Obstetrics,Pediatric)  Goal: Signs and Symptoms of Listed Potential Problems Will be Absent, Minimized or Managed (Postpartum)  Outcome: Ongoing (interventions implemented as appropriate)

## 2019-08-11 NOTE — PLAN OF CARE
Problem: Fall Risk,  (Adult,Obstetrics,Pediatric)  Goal: Identify Related Risk Factors and Signs and Symptoms  Outcome: Ongoing (interventions implemented as appropriate)   19   Fall Risk,  (Adult,Obstetrics,Pediatric)   Related Risk Factors (Fall Risk, ) medical devices;inappropriate footwear;medication side effects;pregnancy weight gain;prolonged bed rest   Signs and Symptoms (Fall Risk, ) presence of fall risk factors     Goal: Absence of Maternal Fall  Outcome: Ongoing (interventions implemented as appropriate)   19   Fall Risk,  (Adult,Obstetrics,Pediatric)   Absence of Maternal Fall achieves outcome     Goal: Absence of  Fall/Drop  Outcome: Ongoing (interventions implemented as appropriate)   19   Fall Risk,  (Adult,Obstetrics,Pediatric)   Absence of [place] Fall/Drop achieves outcome       Problem: Anesthesia/Analgesia, Neuraxial (Obstetrics)  Goal: Signs and Symptoms of Listed Potential Problems Will be Absent, Minimized or Managed (Anesthesia/Analgesia, Neuraxial)  Outcome: Ongoing (interventions implemented as appropriate)   19   Goal/Outcome Evaluation   Problems Assessed (Neuraxial Anesthesia/Analgesia, OB) all   Problems Present (Neuraxial Anesth OB) none

## 2019-08-11 NOTE — L&D DELIVERY NOTE
[facility]  Vaginal Delivery Note    Delivery     Delivery: Vaginal, Spontaneous     YOB: 2021    Time of Birth:  Gestational Age 12:38 AM   39w4d     Anesthesia: Epidural     Delivering clinician: Emili Riojas    Forceps?   No   Vacuum? No    Shoulder dystocia present: No        Delivery narrative: Patient is a 18-year-old -0-0-1 that was admitted at 39 weeks and 3 days for elective induction of labor with favorable cervix.  Patient's pregnancy was complicated by maternal obesity.  She was GBS negative.  Patient received Pitocin for her induction.  She received an epidural at 2 cm and had assisted rupture of membranes for clear fluid.  An IUPC was placed.  Patient then progressed along a normal multiparous labor curve to complete dilation and +2 station.  Patient did have small episodes of recurrent variables and amnioinfusion was started.    Patient delivered a live-born male infant in the occiput anterior position over an intact perineum.  With gentle posterior guidance of the fetal head and maternal pushing, the anterior shoulder was easily delivered followed by the remainder of the body.  The infant was immediately vigorous and placed on mom's chest.  Delayed cord clamping was performed for 30 seconds and cord was clamped and cut by the father.  The placenta then delivered spontaneously and was intact with a three-vessel cord.  Inspection of her perineum revealed a second-degree laceration.  This was repaired with 3-0 Vicryl in the usual fashion.  Bimanual exam revealed a firm uterus and no remaining products of conception.  Perineum was hemostatic.  Patient's bladder was drained with a red Huang catheter for approximately 50 mL's.  She tolerated the procedure well.  All counts were correct at the end of the procedure.    Infant    Findings: male  infant     Infant observations: Weight: No birth weight on file.   Length:    in  Observations/Comments:  infant scale #1      Apgars:  8   @ 1 minute /    9   @ 5 minutes   Infant Name: Mateo     Placenta, Cord, and Fluid    Placenta delivered  Spontaneous  at   8/11/2019 12:41 AM     Cord:    present.   Nuchal Cord?  no   Cord blood obtained: Yes    Cord gases obtained:  No    Cord gas results: Venous:  No results found for: PHCVEN    Arterial:  No results found for: PHCART     Repair    Episiotomy: None    Lacerations: Yes  Laceration Information  Laceration Repaired?   Perineal: 2nd Yes   Periurethral:         Labial:         Sulcus:         Vaginal:         Cervical:           Suture used for repair: 3-0 Vicryl   Estimated Blood Loss: 300 mL           Complications  none    Disposition  Mother to Mother Baby/Postpartum  in stable condition currently.  Baby to remains with mom  in stable condition currently.      Emili Riojas MD  08/11/19  12:58 AM

## 2019-08-11 NOTE — PAYOR COMM NOTE
"[facility]   &  [facility] [place]  [address]    [address] [place] [address]    [place] [address]    Ángel Tillman  Utilization Review/Room Reservations  Phone: [phone], [phone] or [phone]  Fax: [phone]  Email: [email or web address]  Please call, fax back, or email with authorization or any questions! Thanks!        NEED EXTRA DAY ON AUTH#[id number], IOL FOR 8/10-.  19        This fax contains any of the following:  Face Sheet, H&P, progress notes, consults, orders, meds, lab results, labor record, vitals, delivery worksheet, op note, d/c summary.          Terri Gunter (18 y.o. Female)     Date of Birth Social Security Number Address Home Phone MRN    2000  [address] [facility] [address] [phone] [id number]    Jehovah's witness Marital Status          None        Admission Date Admission Type Admitting Provider Attending Provider Department, Room/Bed    8/10/19 Elective Emili Riojas MD Slone, Laura Jane, MD [facility], S303/1    Discharge Date Discharge Disposition Discharge Destination                       Attending Provider:  Emili Riojas MD    Allergies:  No Known Allergies    Isolation:  None   Infection:  None   Code Status:  CPR    Ht:  165.1 cm (65\")   Wt:  94.8 kg (209 lb)    Admission Cmt:  None   Principal Problem:   (normal spontaneous vaginal delivery) [O80]                 Active Insurance as of 8/10/2019     Primary Coverage     Payor Plan Insurance Group Employer/Plan Group    [employer] PAS[employer]ORT      Payor Plan Address Payor Plan Phone Number Payor Plan Fax Number Effective Dates    [address] [phone]  1997 - None Entered    [facility] [id number]       Subscriber Name Subscriber Birth Date Member ID       TERRI GUNTER 2000 [id number]                 Emergency Contacts      (Rel.) " Home Phone Work Phone Mobile Phone    Quinn Gunter (Other) [phone] -- [phone]               History & Physical      Emili Riojas MD at 8/10/2019  1:22 PM          [facility]  Obstetric History and Physical    Chief Complaint   Patient presents with   • Scheduled Induction     elective iduction for term +FM -VB ot LOF        Subjective     Patient is a 18 y.o. female  currently at 39w3d, who presents for elective IOL.  Pregnancy was complicated by maternal obesity.      The following portions of the patients history were reviewed and updated as appropriate: current medications, allergies, past medical history, past surgical history, past family history, past social history and problem list .       Prenatal Information:  Prenatal Results     Initial Prenatal Labs     Test Value Reference Range Date Time    Hemoglobin 11.3 g/dL 12.0 - 16.0 g/dL 19 1211    Hematocrit 33.0 % 36.0 - 46.0 % 19 1211    Platelets 164 10*3/mm3 140 - 450 10*3/mm3 08/10/19 0804    Rubella IgG 3.16 index Immune >0.99 index 18 1516    Hepatitis B SAg Negative  Negative 18 1516    Hepatitis C Ab 0.2 s/co ratio 0.0 - 0.9 s/co ratio 18 1516    RPR Non Reactive  Non Reactive 18 1516    ABO O   08/10/19 0804    Rh Positive   08/10/19 0804    Antibody Screen Negative  Negative 18 1516    HIV Non Reactive  Non Reactive 18 1516    Urine Culture No growth   19 1709    Gonorrhea Negative  Negative 19 0950    Chlamydia Negative  Negative 19 0950    TSH              2nd and 3rd Trimester     Test Value Reference Range Date Time    Hemoglobin (repeated) 11.2 g/dL 12.0 - 15.9 g/dL 08/10/19 0804    Hematocrit (repeated) 33.7 % 34.0 - 46.6 % 08/10/19 0804     mg/dL 65 - 139 mg/dL 05/15/19 1512    Antibody Screen (repeated) Negative   08/10/19 0804    GTT Fasting        GTT 1 Hr 92   17     GTT 2 Hr        GTT 3 Hr        Group B Strep Negative  Negative  07/10/19 1522          Drug Screening     Test Value Reference Range Date Time    Amphetamine Screen        Barbiturate Screen        Benzodiazepine Screen        Methadone Screen        Phencyclidine Screen        Opiates Screen        THC Screen ++POSITIVE++ ng/mL Cutoff:5 ng/mL 12/11/18 1516    Cocaine Screen        Propoxyphene Screen        Buprenorphine Screen        Methamphetamine Screen        Oxycodone Screen        Tricyclic Antidepressants Screen              Other (Risk screening)     Test Value Reference Range Date Time    Varicella IgG        Parvovirus IgG        CMV IgG        Cystic Fibrosis        Hemoglobin electrophoresis        NIPT 46 XY   01/24/19     MSAFP-4        AFP (for NTD only)                  External Prenatal Results     Pregnancy Outside Results - Transcribed From Office Records - See Scanned Records For Details     Test Value Date Time    Hgb 11.2 g/dL 08/10/19 0804    Hct 33.7 % 08/10/19 0804    ABO O  08/10/19 0804    Rh Positive  08/10/19 0804    Antibody Screen Negative  08/10/19 0804    Glucose Fasting GTT       Glucose Tolerance Test 1 hour 92  06/13/17     Glucose Tolerance Test 3 hour       Gonorrhea (discrete) Negative  01/24/19 0950    Chlamydia (discrete) Negative  01/24/19 0950    RPR Non Reactive  12/11/18 1516    VDRL       Syphilis Antibody       Rubella 3.16 index 12/11/18 1516    HBsAg Negative  12/11/18 1516    Herpes Simplex Virus PCR       Herpes Simplex VIrus Culture       HIV Non Reactive  12/11/18 1516    Hep C RNA Quant PCR       Hep C Antibody 0.2 s/co ratio 12/11/18 1516    AFP       Group B Strep Negative  07/10/19 1522    GBS Susceptibility to Clindamycin       GBS Susceptibility to Erythromycin       Fetal Fibronectin       Genetic Testing, Maternal Blood             Drug Screening     Test Value Date Time    Urine Drug Screen       Amphetamine Screen       Barbiturate Screen Negative  09/16/17 1441    Benzodiazepine Screen Negative  09/16/17 1441     Methadone Screen Negative  17 1441    Phencyclidine Screen       Opiates Screen Negative  17 1441    THC Screen ++POSITIVE++ ng/mL 18 1516    Cocaine Screen       Propoxyphene Screen       Buprenorphine Screen       Methamphetamine Screen       Oxycodone Screen Negative  17 1441    Tricyclic Antidepressants Screen                    Past OB History:     Obstetric History       T1      L1     SAB0   TAB0   Ectopic0   Molar0   Multiple0   Live Births1       # Outcome Date GA Lbr Brain/2nd Weight Sex Delivery Anes PTL Lv   2 Current            1 Term 17 40w3d 21:50 / 01:35 3669 g (8 lb 1.4 oz) M Vag-Spont EPI N DUSTY      Name: SILVESTRE CHI      Apgar1:  8                Apgar5: 9          Past Medical History: Past Medical History:   Diagnosis Date   • Asthma     states has misplaced inhaler, hasn't used inhaler since 2  mo pregnant   • Multigravida in first trimester 2018      Past Surgical History Past Surgical History:   Procedure Laterality Date   • WISDOM TOOTH EXTRACTION        Family History: Family History   Problem Relation Age of Onset   • Diabetes Father       Social History:  reports that she has quit smoking. She has never used smokeless tobacco.   reports that she does not drink alcohol.   reports that she does not use drugs.        General ROS: Pertinent items are noted in HPI, all other systems reviewed and negative    Objective       Vital Signs Range for the last 24 hours  Temperature: Temp:  [98.4 °F (36.9 °C)] 98.4 °F (36.9 °C)   Temp Source: Temp src: Oral   BP: BP: ()/(46-70) 97/51   Pulse: Heart Rate:  [75-96] 87   Respirations:     SPO2: SpO2:  [99 %] 99 %   O2 Amount (l/min):     O2 Devices Device (Oxygen Therapy): room air   Weight: Weight:  [94.8 kg (209 lb)] 94.8 kg (209 lb)     Physical Examination: General appearance - alert, well appearing, and in no distress  Abdomen - gravid, nontender, EFW 7.5#  Pelvic - Cervix 2cm/50%/-2   AROM performed with return of clear fluid.  IUPC placed without difficulty.  Extremities - pedal edema 1 +    Presentation: vertex   Cervix: Exam by: Method: sterile exam per RN   Dilation: Cervical Dilation (cm): 1-2   Effacement: Cervical Effacement: 50%   Station:         Fetal Heart Rate Assessment   Method: Fetal HR Assessment Method: external   Beats/min: Fetal HR (beats/min): 135   Baseline: Fetal Heart Baseline Rate: normal range(periods of moderate )   Variability: Fetal HR Variability: minimal (detectable, amplitude less than or equal to 5 bpm)   Accels: Fetal HR Accelerations: greater than/equal to 15 bpm, lasting at least 15 seconds   Decels: Fetal HR Decelerations: absent   Tracing Category:       Uterine Assessment   Method: Method: palpation, external tocotransducer   Frequency (min): Contraction Frequency (Minutes): 2-3   Ctx Count in 10 min: Contractions in 10 Minutes: 5   Duration:     Intensity: Contraction Intensity: mild by palpation   Intensity by IUPC:     Resting Tone: Uterine Resting Tone: soft by palpation   Resting Tone by IUPC:     [place] Units:         Assessment/Plan       Maternal obesity affecting pregnancy, antepartum        Assessment:  1.  Intrauterine pregnancy at 39w3d gestation with reassuring fetal status.    2.  induction of labor  for elective reasons  with favorable cervix  3.  Obstetrical history significant for maternal obesity.  4.  GBS status:   Strep Gp B Culture   Date Value Ref Range Status   07/10/2019 Negative Negative Final     Comment:     Centers for Disease Control and Prevention (CDC) and American Congress  of Obstetricians and Gynecologists (ACOG) guidelines for prevention of   group B streptococcal (GBS) disease specify co-collection of  a vaginal and rectal swab specimen to maximize sensitivity of GBS  detection. Per the CDC and ACOG, swabbing both the lower vagina and  rectum substantially increases the yield of detection compared  with  sampling the vagina alone.  Penicillin G, ampicillin, or cefazolin are indicated for intrapartum  prophylaxis of  GBS colonization. Reflex susceptibility  testing should be performed prior to use of clindamycin only on GBS  isolates from penicillin-allergic women who are considered a high risk  for anaphylaxis. Treatment with vancomycin without additional testing  is warranted if resistance to clindamycin is noted.         Plan:  1. fetal and uterine monitoring  continuously, labor augmentation  Pitocin and analgesia with  epidural  2. Plan of care has been reviewed with patient and significant other  3.  Risks, benefits of treatment plan have been discussed.  4.  All questions have been answered.      Emili Riojas MD  8/10/2019  1:22 PM    Electronically signed by Emili Riojas MD at 8/10/2019  1:24 PM          Operative/Procedure Notes (last 48 hours) (Notes from 2019  7:45 AM through 2019  7:45 AM)      Emili Riojas MD at 2019 12:56 AM          [facility]  Vaginal Delivery Note    Delivery     Delivery: Vaginal, Spontaneous     YOB: 2021    Time of Birth:  Gestational Age 12:38 AM   39w4d     Anesthesia: Epidural     Delivering clinician: Emili Riojas    Forceps?   No   Vacuum? No    Shoulder dystocia present: No        Delivery narrative: Patient is a 18-year-old -0-0-1 that was admitted at 39 weeks and 3 days for elective induction of labor with favorable cervix.  Patient's pregnancy was complicated by maternal obesity.  She was GBS negative.  Patient received Pitocin for her induction.  She received an epidural at 2 cm and had assisted rupture of membranes for clear fluid.  An IUPC was placed.  Patient then progressed along a normal multiparous labor curve to complete dilation and +2 station.  Patient did have small episodes of recurrent variables and amnioinfusion was started.    Patient delivered a live-born male infant in the occiput  anterior position over an intact perineum.  With gentle posterior guidance of the fetal head and maternal pushing, the anterior shoulder was easily delivered followed by the remainder of the body.  The infant was immediately vigorous and placed on mom's chest.  Delayed cord clamping was performed for 30 seconds and cord was clamped and cut by the father.  The placenta then delivered spontaneously and was intact with a three-vessel cord.  Inspection of her perineum revealed a second-degree laceration.  This was repaired with 3-0 Vicryl in the usual fashion.  Bimanual exam revealed a firm uterus and no remaining products of conception.  Perineum was hemostatic.  Patient's bladder was drained with a red Huang catheter for approximately 50 mL's.  She tolerated the procedure well.  All counts were correct at the end of the procedure.    Infant    Findings: male  infant     Infant observations: Weight: No birth weight on file.   Length:    in  Observations/Comments:  infant scale #1      Apgars: 8   @ 1 minute /    9   @ 5 minutes   Infant Name: Mateo     Placenta, Cord, and Fluid    Placenta delivered  Spontaneous  at   8/11/2019 12:41 AM     Cord:    present.   Nuchal Cord?  no   Cord blood obtained: Yes    Cord gases obtained:  No    Cord gas results: Venous:  No results found for: PHCVEN    Arterial:  No results found for: PHCART     Repair    Episiotomy: None    Lacerations: Yes  Laceration Information  Laceration Repaired?   Perineal: 2nd Yes   Periurethral:         Labial:         Sulcus:         Vaginal:         Cervical:           Suture used for repair: 3-0 Vicryl   Estimated Blood Loss: 300 mL           Complications  none    Disposition  Mother to Mother Baby/Postpartum  in stable condition currently.  Baby to remains with mom  in stable condition currently.      Emili Riojas MD  08/11/19  12:58 AM        Electronically signed by Emili Riojas MD at 8/11/2019 12:59 AM

## 2019-08-11 NOTE — PLAN OF CARE
Problem: Patient Care Overview  Goal: Individualization and Mutuality  Outcome: Ongoing (interventions implemented as appropriate)   08/10/19 1816 08/11/19 0347   Individualization   Patient Specific Preferences breastfeed, no pacifier  --    Patient Specific Goals (Include Timeframe) vaginal delivery --    Patient Specific Interventions pitocin induction  --    Mutuality/Individual Preferences   What Anxieties, Fears, Concerns, or Questions Do You Have About Your Care? --  baby not breastfeeding well   What Information Would Help Us Give You More Personalized Care? --  first baby was a boy and is 2 years old   How Would You and/or Your Support Person Like to Participate in Your Care? --  at bedside and cut cord   Mutuality/Individual Preferences   How to Address Anxieties/Fears --  educate and keep up to date on poc     Goal: Discharge Needs Assessment  Outcome: Ongoing (interventions implemented as appropriate)   08/11/19 0347   Discharge Needs Assessment   Readmission Within the Last 30 Days no previous admission in last 30 days   Concerns to be Addressed no discharge needs identified   Patient/Family Anticipates Transition to home with family   Patient/Family Anticipated Services at Transition none   Transportation Concerns car, none   Transportation Anticipated car, drives self   Anticipated Changes Related to Illness none   Equipment Needed After Discharge none   Offered/Gave Vendor List no   Disability   Equipment Currently Used at Home none     Goal: Interprofessional Rounds/Family Conf  Outcome: Ongoing (interventions implemented as appropriate)   08/11/19 0347   Interdisciplinary Rounds/Family Conf   Participants family;nursing;patient;physician       Problem: Skin Injury Risk (Adult)  Goal: Identify Related Risk Factors and Signs and Symptoms  Outcome: Ongoing (interventions implemented as appropriate)   08/11/19 0347   Skin Injury Risk (Adult)   Related Risk Factors (Skin Injury Risk) mechanical  forces;medical devices;medication;mobility impaired     Goal: Skin Health and Integrity  Outcome: Ongoing (interventions implemented as appropriate)   08/11/19 9002   Skin Injury Risk (Adult)   Skin Health and Integrity achieves outcome

## 2019-08-11 NOTE — ANESTHESIA PROCEDURE NOTES
Labor Epidural      Patient reassessed immediately prior to procedure    Patient location during procedure: OB  Start Time: 8/10/2019 7:50 PM  Stop Time: 8/10/2019 8:15 PM  Performed By  Anesthesiologist: Martha Singh MD  Preanesthetic Checklist  Completed: patient identified, site marked, surgical consent, pre-op evaluation, timeout performed, IV checked, risks and benefits discussed and monitors and equipment checked  Additional Notes  Called for unintentional catheter disconnect by family at unknown time. Catheter pulled, tip intact. Sat patient up, attempts x 2, successful JAYY at 7cm. Test dose 3.5cc negative. Bolus dose via pump 3 cc, pump set to 10cc /hr  Prep:  Pt Position:sitting  Sterile Tech:cap, gloves, gown, mask and sterile barrier  Prep:chlorhexidine gluconate and isopropyl alcohol  Monitoring:blood pressure monitoring, continuous pulse oximetry and EKG  Epidural Block Procedure:  Approach:midline  Guidance:landmark technique and palpation technique  Location:L4-L5  Needle Type:Tuohy  Needle Gauge:17 G  Loss of Resistance Medium: saline  Loss of Resistance: 7cm  Cath Depth at skin:12 cm  Paresthesia: none  Aspiration:negative  Test Dose:negative  Number of Attempts: 2  Post Assessment:  Dressing:occlusive dressing applied and secured with tape  Pt Tolerance:patient tolerated the procedure well with no apparent complications  Complications:no

## 2019-08-11 NOTE — PROGRESS NOTES
Vital:   Vitals:    08/11/19 0755   BP: 101/64   Pulse: 88   Resp: 17   Temp: 98.5 °F (36.9 °C)   SpO2:    CC postpartum rounds day 0  Lochia Scant  Episiotomy: Sutures healing well   Hemoglobin:    No results found for this or any previous visit (from the past 24 hour(s)).      Blood type: O+     Rubella: Immune  Impression  1.  Postpartum day 0 vaginal delivery doing well    Plan  1.  Circumcision today as discussed with mother.

## 2019-08-12 LAB
BASOPHILS # BLD AUTO: 0.04 10*3/MM3 (ref 0–0.2)
BASOPHILS NFR BLD AUTO: 0.4 % (ref 0–1.5)
DEPRECATED RDW RBC AUTO: 45.1 FL (ref 37–54)
EOSINOPHIL # BLD AUTO: 0.07 10*3/MM3 (ref 0–0.4)
EOSINOPHIL NFR BLD AUTO: 0.7 % (ref 0.3–6.2)
ERYTHROCYTE [DISTWIDTH] IN BLOOD BY AUTOMATED COUNT: 12.8 % (ref 12.3–15.4)
HCT VFR BLD AUTO: 33.7 % (ref 34–46.6)
HGB BLD-MCNC: 11.2 G/DL (ref 12–15.9)
IMM GRANULOCYTES # BLD AUTO: 0.07 10*3/MM3 (ref 0–0.05)
IMM GRANULOCYTES NFR BLD AUTO: 0.7 % (ref 0–0.5)
LYMPHOCYTES # BLD AUTO: 2.7 10*3/MM3 (ref 0.7–3.1)
LYMPHOCYTES NFR BLD AUTO: 26 % (ref 19.6–45.3)
MCH RBC QN AUTO: 32.2 PG (ref 26.6–33)
MCHC RBC AUTO-ENTMCNC: 33.2 G/DL (ref 31.5–35.7)
MCV RBC AUTO: 96.8 FL (ref 79–97)
MONOCYTES # BLD AUTO: 0.96 10*3/MM3 (ref 0.1–0.9)
MONOCYTES NFR BLD AUTO: 9.2 % (ref 5–12)
NEUTROPHILS # BLD AUTO: 6.55 10*3/MM3 (ref 1.7–7)
NEUTROPHILS NFR BLD AUTO: 63 % (ref 42.7–76)
NRBC BLD AUTO-RTO: 0.1 /100 WBC (ref 0–0.2)
PLATELET # BLD AUTO: 151 10*3/MM3 (ref 140–450)
PMV BLD AUTO: 10.5 FL (ref 6–12)
RBC # BLD AUTO: 3.48 10*6/MM3 (ref 3.77–5.28)
WBC NRBC COR # BLD: 10.39 10*3/MM3 (ref 3.4–10.8)

## 2019-08-12 PROCEDURE — 99024 POSTOP FOLLOW-UP VISIT: CPT | Performed by: OBSTETRICS & GYNECOLOGY

## 2019-08-12 PROCEDURE — 85025 COMPLETE CBC W/AUTO DIFF WBC: CPT | Performed by: OBSTETRICS & GYNECOLOGY

## 2019-08-12 RX ADMIN — DOCUSATE SODIUM 100 MG: 100 CAPSULE, LIQUID FILLED ORAL at 19:35

## 2019-08-12 RX ADMIN — HYDROCODONE BITARTRATE AND ACETAMINOPHEN 1 TABLET: 5; 325 TABLET ORAL at 19:35

## 2019-08-12 RX ADMIN — DOCUSATE SODIUM 100 MG: 100 CAPSULE, LIQUID FILLED ORAL at 08:24

## 2019-08-12 RX ADMIN — Medication 1 TABLET: at 08:24

## 2019-08-12 RX ADMIN — IBUPROFEN 600 MG: 600 TABLET, FILM COATED ORAL at 16:16

## 2019-08-12 RX ADMIN — IBUPROFEN 600 MG: 600 TABLET, FILM COATED ORAL at 08:24

## 2019-08-12 RX ADMIN — HYDROCODONE BITARTRATE AND ACETAMINOPHEN 1 TABLET: 5; 325 TABLET ORAL at 04:01

## 2019-08-12 RX ADMIN — HYDROCODONE BITARTRATE AND ACETAMINOPHEN 1 TABLET: 5; 325 TABLET ORAL at 12:11

## 2019-08-12 NOTE — PROGRESS NOTES
"Discharge Planning Assessment  [facility]     Patient Name: Terri Gunter  MRN: [id number]  Today's Date: 8/12/2019    Admit Date: 8/10/2019    Discharge Needs Assessment    No documentation.       Discharge Plan     Row Name 08/12/19 1507       Plan    Plan  Infant to discharge home with mother. CSW will follow for cord toxicology results. JANET Macdonald    Plan Comments  Mother: Terri Gunter, MRN [id number]; Infant: Mateo Gunter, MRN [id number]. CSW was consulted for \"Hx THC use; last positive urine 12/2018; babies urine tox neg; cord sent.\" CSW spoke with PEDRO Griffiths who voiced concern that when father was visiting with multiple other visitors the odor of marijuana was present. CSW met with mother who verified address, phone and insurance. Mother has spoken with [employer] about adding infant to insurance. Mother reports she lives at [place] [address] address with maternal grandfather and maternal uncle. Mother reports father of infant, Mateo, is involved and supportive. Mother reports a good support system with her family which includes, maternal grandmother, maternal grandfather, maternal uncle and extended family. Family is caring for her other child while she is in the hospital. Mother reports she has car seat, crib, clothes and other infant items. Mother is breast and bottle feeding and is current with [facility]. Mother verified her prenatal care with Dr. Riojas and plans on infant follow up with [facility]. Mother reports she is comfortable scheduling follow up appointments on her own and reports she will have transportation to appointments. Mother denies any violence, threats or feeling unsafe. No urine toxicology obtained on mother at admission, infant urine toxicology is negative. Mother was positive prenatally on 12/11/18 for THC. Mother admits to using marijuana prior to learning she was pregnant and mother reports stopping use once learning of her pregnancy. As mother has another child at home, report was " made to CPS for prenatal THC use, report #[id number]. CSW will follow up with CPS to see if report was accepted for investigation and if mother has an active CPS case. CSW will also follow for cord toxicology results and will report to CPS if warranted. Clarisse Sharpe, CSW        Destination      No service coordination in this encounter.      Durable Medical Equipment      No service coordination in this encounter.      Dialysis/Infusion      No service coordination in this encounter.      Home Medical Care      No service coordination in this encounter.      Therapy      No service coordination in this encounter.      Community Resources      No service coordination in this encounter.          Demographic Summary     Row Name 08/12/19 1505       General Information    Admission Type  inpatient    Arrived From  home    Referral Source  nursing    Reason for Consult  substance use concerns    General Information Comments  Hx THC use; last positive urine 12/2018; babies urine tox neg; cord sent        Functional Status     Row Name 08/12/19 1505       Mental Status    General Appearance WDL  WDL       Mental Status Summary    Recent Changes in Mental Status/Cognitive Functioning  unable to assess       Employment/    Employment Status  employed part time    Current or Previous Occupation  service industry    Employment/ Comments  A [employer]        Psychosocial     Row Name 08/12/19 1506       Behavior WDL    Behavior WDL  WDL       Emotion Mood WDL    Emotion/Mood/Affect WDL  WDL       Speech WDL    Speech WDL  WDL       Perceptual State WDL    Perceptual State WDL  WDL       Thought Process WDL    Thought Process WDL  WDL       Intellectual Performance WDL    Intellectual Performance WDL  WDL       Coping/Stress    Major Change/Loss/Stressor  birth    Patient Personal Strengths  able to adapt;future/goal oriented;positive attitude;strong support system    Sources of Support  other family  members;parent(s);sibling(s);significant other        Abuse/Neglect     Row Name 08/12/19 1506       Personal Safety    Feels Unsafe at Home or Work/School  no    Feels Threatened by Someone  no    Does Anyone Try to Keep You From Having Contact with Others or Doing Things Outside Your Home?  no    Physical Signs of Abuse Present  no        Legal    No documentation.       Substance Abuse     Row Name 08/12/19 1506       Substance Use    Substance Use Status  past street drug/inhalant/medication abuse    Substance Use Comment  Mother positive for THC on 12/11/18, infant urine negative at admission. Mother reports using marijuana prior to learning she was pregnant, reports ceasing use once she found out about pregnancy.        Patient Forms    No documentation.           ZEYAD Macdonald

## 2019-08-12 NOTE — PLAN OF CARE
Problem: Patient Care Overview  Goal: Plan of Care Review  Outcome: Ongoing (interventions implemented as appropriate)   08/12/19 1953   Coping/Psychosocial   Plan of Care Reviewed With patient   Plan of Care Review   Progress improving   OTHER   Outcome Summary Doing well, pain well controlled w po meds, working on bst feeding, plans to dc 8/13       Problem: Postpartum (Vaginal Delivery) (Adult,Obstetrics,Pediatric)  Goal: Signs and Symptoms of Listed Potential Problems Will be Absent, Minimized or Managed (Postpartum)  Outcome: Ongoing (interventions implemented as appropriate)   08/12/19 1953   Goal/Outcome Evaluation   Problems Assessed (Postpartum Vaginal Delivery) all   Problems Present (Postpartum Vag Deliv) none

## 2019-08-12 NOTE — PROGRESS NOTES
"Subjective:  Postpartum Day 1:     The patient feels well.  Pain is well controlled with current medications. The baby is well.  Urinary output is adequate. The patient is ambulating well. The patient is tolerating a normal diet. Flatus has been passed.      Objective:    Vital signs in last 24 hours:  Blood pressure 105/68, pulse 75, temperature 98.6 °F (37 °C), temperature source Oral, resp. rate 16, height 165.1 cm (65\"), weight 94.8 kg (209 lb), last menstrual period 11/07/2018, SpO2 99 %, currently breastfeeding.      General:    alert, appears stated age and cooperative   Uterine Fundus:   firm     Labs    Rh + / Male infant.  Circumcision completed.    Assessment/Plan:.     Postpartum Day #1  - continue postpartum course.  Likely discharge tomorrow.    Ry Schultz MD    "

## 2019-08-12 NOTE — PROGRESS NOTES
"Continued Stay Note  [facility]     Patient Name: Terri Gunter  MRN: [id number]  Today's Date: 8/12/2019    Admit Date: 8/10/2019    Discharge Plan     Row Name 08/12/19 1537       Plan    Plan  Infant to discharge home with mother. CSW will follow for cord toxicology results. JANET Macdonald    Plan Comments  Mother: Terri Gunter, MRN [id number]; Infant: Mateo Gunter, MRN [id number]. CSW spoke with January at [facility] hotline who advised mother does not have an active CPS case currently. CSW will follow up on 8/13 to see if report #[id number] was accepted for investigation. CSW will also follow for cord toxicology results and will report to CPS if warranted. JANET Macdonald    Row Name 08/12/19 1507       Plan    Plan  Infant to discharge home with mother. CSW will follow for cord toxicology results. JANET Macdonald    Plan Comments  Mother: Terri Gunter, MRN [id number]; Infant: Mateo Gunter, MRN [id number]. CSW was consulted for \"Hx THC use; last positive urine 12/2018; babies urine tox neg; cord sent.\" CSW spoke with PEDRO Griffiths who voiced concern that when father was visiting with multiple other visitors the odor of marijuana was present. CSW met with mother who verified address, phone and insurance. Mother has spoken with [employer] about adding infant to insurance. Mother reports she lives [place] with maternal grandfather and maternal uncle. Mother reports father of infant, Mateo, is involved and supportive. Mother reports a good support system with her family which includes, maternal grandmother, maternal grandfather, maternal uncle and extended family. Family is caring for her other child while she is in the hospital. Mother reports she has car seat, crib, clothes and other infant items. Mother is breast and bottle feeding and is current with WIC. Mother verified her prenatal care with Dr. Riojas and plans on infant follow up with [facility]. Mother reports she is comfortable " scheduling follow up appointments on her own and reports she will have transportation to appointments. Mother denies any violence, threats or feeling unsafe. No urine toxicology obtained on mother at admission, infant urine toxicology is negative. Mother was positive prenatally on 12/11/18 for THC. Mother admits to using marijuana prior to learning she was pregnant and mother reports stopping use once learning of her pregnancy. As mother has another child at home, report was made to CPS for prenatal THC use, report #[id number]. CSW will follow up with CPS to see if report was accepted for investigation and if mother has an active CPS case. CSW will also follow for cord toxicology results and will report to CPS if warranted. JANET Macdonald        Discharge Codes    No documentation.             ZEYAD Macdonald

## 2019-08-12 NOTE — PROGRESS NOTES
"Continued Stay Note  [facility]     Patient Name: Terri Gunter  MRN: [id number]  Today's Date: 8/12/2019    Admit Date: 8/10/2019    Discharge Plan     Row Name 08/12/19 1538       Plan    Plan  Infant to discharge home with mother. CSW will follow for cord toxicology results. JANET Macdonald    Plan Comments  Mother: Terri Gunter, MRN [id number]; Infant: Mateo Gunter, MRN [id number]. CSW spoke with January at [facility] who advised mother does not have an active CPS case currently. CSW will follow up on 8/13 to see if report #[id number] was accepted for investigation. CSW will also follow for cord toxicology results and will report to CPS if warranted. JANET Macdonald    Row Name 08/12/19 1537       Plan    Plan  Infant to discharge home with mother. CSW will follow for cord toxicology results. JANET Macdonald    Plan Comments  Mother: Terri Gunter, MRN [id number]; Infant: Mateo Gunter, MRN [id number]. CSW spoke with January at [facility] who advised mother does not have an active CPS case currently. CSW will follow up on 8/13 to see if report #[id number] was accepted for investigation. CSW will also follow for cord toxicology results and will report to CPS if warranted. JANET Macdonald    Row Name 08/12/19 1507       Plan    Plan  Infant to discharge home with mother. CSW will follow for cord toxicology results. JANET Macdonald    Plan Comments  Mother: Terri Gunter, MRN [id number]; Infant: Mateo Gunter, MRN [id number]. CSW was consulted for \"Hx THC use; last positive urine 12/2018; babies urine tox neg; cord sent.\" CSW spoke with PEDRO Griffiths who voiced concern that when father was visiting with multiple other visitors the odor of marijuana was present. CSW met with mother who verified address, phone and insurance. Mother has spoken with [employer] about adding infant to insurance. Mother reports she lives [place] with maternal grandfather and maternal uncle. Mother reports " father of infant, Mateo, is involved and supportive. Mother reports a good support system with her family which includes, maternal grandmother, maternal grandfather, maternal uncle and extended family. Family is caring for her other child while she is in the hospital. Mother reports she has car seat, crib, clothes and other infant items. Mother is breast and bottle feeding and is current with WIC. Mother verified her prenatal care with Dr. Riojas and plans on infant follow up with [facility]. Mother reports she is comfortable scheduling follow up appointments on her own and reports she will have transportation to appointments. Mother denies any violence, threats or feeling unsafe. No urine toxicology obtained on mother at admission, infant urine toxicology is negative. Mother was positive prenatally on 12/11/18 for THC. Mother admits to using marijuana prior to learning she was pregnant and mother reports stopping use once learning of her pregnancy. As mother has another child at home, report was made to CPS for prenatal THC use, report #[id number]. CSW will follow up with CPS to see if report was accepted for investigation and if mother has an active CPS case. CSW will also follow for cord toxicology results and will report to CPS if warranted. JANET Macdonald        Discharge Codes    No documentation.             ZEYAD Macdonald

## 2019-08-12 NOTE — PLAN OF CARE
Problem: Patient Care Overview  Goal: Plan of Care Review  Outcome: Ongoing (interventions implemented as appropriate)   08/12/19 0136   Coping/Psychosocial   Plan of Care Reviewed With patient   Plan of Care Review   Progress improving   OTHER   Outcome Summary VSS, pain well controlled, breastfeeding baby - needs assistance, lochia WNL, voiding without difficulty       Problem: Postpartum (Vaginal Delivery) (Adult,Obstetrics,Pediatric)  Goal: Signs and Symptoms of Listed Potential Problems Will be Absent, Minimized or Managed (Postpartum)  Outcome: Ongoing (interventions implemented as appropriate)   08/12/19 0136   Goal/Outcome Evaluation   Problems Assessed (Postpartum Vaginal Delivery) all   Problems Present (Postpartum Vag Deliv) none       Problem: Breastfeeding (Adult,Obstetrics,Pediatric)  Goal: Signs and Symptoms of Listed Potential Problems Will be Absent, Minimized or Managed (Breastfeeding)  Outcome: Ongoing (interventions implemented as appropriate)   08/12/19 0136   Goal/Outcome Evaluation   Problems Assessed (Breastfeeding) all   Problems Present (Breastfeeding) none

## 2019-08-12 NOTE — LACTATION NOTE
Lactation Consult Note  P2 term baby with a painful latch per Mom. After assisting with a deeper latch, Mom denies discomfort and she was able to independently latch baby to second side also without pain. She has a breast pump at home. Encouraged to call for further assist.  Evaluation Completed: 2019 8:19 AM  Patient Name: Terri Gunter  :  2000  MRN:  [id number]     REFERRAL  INFORMATION:                          Date of Referral: 19   Person Making Referral: patient  Maternal Reason for Referral: breastfeeding currently, latch painful       DELIVERY HISTORY:          Skin to skin initiation date/time: 2019  12:38 AM   Skin to skin end date/time:              MATERNAL ASSESSMENT:  Breast Size Issue: none (19 : Lenora Earl RN)  Breast Shape: Bilateral:, pendulous (19 : Lenora Earl RN)  Breast Density: Bilateral:, soft (19 : Lenora Earl RN)  Areola: Bilateral:, elastic (19 : Lenora Earl RN)  Nipples: Bilateral:, everted (19 : Lenora Earl RN)                INFANT ASSESSMENT:  Information for the patient's :  Frances Gunter [[id number]]   No past medical history on file.    Feeding Readiness Cues: quiet (19 : Lenora Earl RN)   Feeding Method: breastfeeding (19 : Lenora Earl RN)   Feeding Tolerance/Success: arousal required, coordinated suck, coordinated swallow (19 : Lenora Earl RN)                   Feeding Interventions: latch assistance provided (19 : Lenora Earl RN)       Additional Documentation: LATCH Score (Group) (19 : Lenora Earl RN)           Breastfeeding: breastfeeding, bilateral (19 : Lenora Earl RN)   Infant Positioning: cross-cradle (19 : Lenora Earl RN)           Effective Latch During Feeding: yes (19  0750 : Lenora Earl RN)   Suck/Swallow Coordination: present (19 : Lenora Earl RN)   Signs of Milk Transfer: infant jaw motion present, suck/swallow ratio (19 : Lenora Earl RN)       Latch: 2-->grasps breast, tongue down, lips flanged, rhythmic sucking (19 : Lenora Earl RN)   Audible Swallowin-->a few with stimulation (19 : Lenora Earl RN)   Type of Nipple: 2-->everted (after stimulation) (19 : Lenora Earl RN)   Comfort (Breast/Nipple): 2-->soft/nontender (19 : Lenora Earl RN)   Hold (Positioning): 1-->minimal assist, teach one side, mother does other, staff holds (19 : Lenora Earl RN)   Latch Score: 8 (19 : Lenora Earl RN)     Infant-Driven Feeding Scales - Readiness: Alert once handled. Some rooting or takes pacifier. Adequate tone. (19 : Lenora Earl RN)   Infant-Driven Feeding Scales - Quality: Nipples with a strong coordinated SSB but fatigues with progression. (19 : Lenora Earl RN)             MATERNAL INFANT FEEDING:     Maternal Emotional State: assist needed (19 : Lenora Earl RN)  Infant Positioning: cross-cradle (19 : Lenora Earl RN)   Signs of Milk Transfer: infant jaw motion present, suck/swallow ratio (19 : Lenora Earl RN)  Pain with Feeding: no (19 : Lenora Earl RN)           Milk Ejection Reflex: present (19 : Lenora Earl RN)           Latch Assistance: yes (19 : Lenora Earl, PEDRO)                               EQUIPMENT TYPE:  Breast Pump Type: double electric, personal (19 : Lenora Earl, RN)                              BREAST PUMPING:          LACTATION REFERRALS:

## 2019-08-12 NOTE — NURSING NOTE
Pt ready to have catheter removed. Pt stated her legs are no longer numb and she can move them with ease. Catheter removed in bed. Pt had hospital socks already on. With my help, pt stood at the side of the bed and then her right leg gave out and she landed on the floor on her buttocks. She was able to stand up with my help and insisted on walking to the bathroom. She completed jose miguel care while sitting on the toilet. At this point I called another nurse into the room to help pt back to the bed. She was able to ambulate back to bed without another fall. I instructed pt not to get out of bed without me; when she has to void I will come back in and help her. Pt verbalized understanding.

## 2019-08-13 VITALS
HEART RATE: 70 BPM | DIASTOLIC BLOOD PRESSURE: 72 MMHG | TEMPERATURE: 98.5 F | RESPIRATION RATE: 18 BRPM | WEIGHT: 209 LBS | SYSTOLIC BLOOD PRESSURE: 115 MMHG | OXYGEN SATURATION: 99 % | BODY MASS INDEX: 34.82 KG/M2 | HEIGHT: 65 IN

## 2019-08-13 PROBLEM — O99.210 MATERNAL OBESITY AFFECTING PREGNANCY, ANTEPARTUM: Status: RESOLVED | Noted: 2019-08-10 | Resolved: 2019-08-13

## 2019-08-13 PROBLEM — O99.511 ASTHMA AFFECTING PREGNANCY IN FIRST TRIMESTER: Status: RESOLVED | Noted: 2018-12-27 | Resolved: 2019-08-13

## 2019-08-13 PROBLEM — J45.909 ASTHMA AFFECTING PREGNANCY IN FIRST TRIMESTER: Status: RESOLVED | Noted: 2018-12-27 | Resolved: 2019-08-13

## 2019-08-13 PROBLEM — O26.03 EXCESSIVE WEIGHT GAIN DURING PREGNANCY IN THIRD TRIMESTER: Status: RESOLVED | Noted: 2019-05-15 | Resolved: 2019-08-13

## 2019-08-13 PROCEDURE — 99024 POSTOP FOLLOW-UP VISIT: CPT | Performed by: OBSTETRICS & GYNECOLOGY

## 2019-08-13 RX ORDER — HYDROCODONE BITARTRATE AND ACETAMINOPHEN 5; 325 MG/1; MG/1
1 TABLET ORAL EVERY 4 HOURS PRN
Qty: 35 TABLET | Refills: 0 | Status: SHIPPED | OUTPATIENT
Start: 2019-08-13 | End: 2020-12-04

## 2019-08-13 RX ORDER — IBUPROFEN 600 MG/1
600 TABLET ORAL EVERY 6 HOURS PRN
Qty: 30 TABLET | Refills: 2 | Status: SHIPPED | OUTPATIENT
Start: 2019-08-13 | End: 2022-04-15

## 2019-08-13 RX ADMIN — DOCUSATE SODIUM 100 MG: 100 CAPSULE, LIQUID FILLED ORAL at 07:52

## 2019-08-13 RX ADMIN — IBUPROFEN 600 MG: 600 TABLET, FILM COATED ORAL at 03:27

## 2019-08-13 RX ADMIN — HYDROCODONE BITARTRATE AND ACETAMINOPHEN 1 TABLET: 5; 325 TABLET ORAL at 07:52

## 2019-08-13 RX ADMIN — HYDROCODONE BITARTRATE AND ACETAMINOPHEN 1 TABLET: 5; 325 TABLET ORAL at 03:27

## 2019-08-13 RX ADMIN — Medication 1 TABLET: at 08:04

## 2019-08-13 NOTE — DISCHARGE SUMMARY
Date of Discharge:  8/13/2019    Discharge Diagnosis: 39 weeks 4 days gestation status post spontaneous vaginal delivery    Presenting Problem/History of Present Illness  Maternal obesity affecting pregnancy, antepartum [O99.210]       Hospital Course  Patient is a 18 y.o. female presented for induction of labor at 39 weeks 4 days gestation and underwent an uncomplicated spontaneous control vaginal delivery of a live viable male infant weighing 7 pounds 8 ounces with Apgars of 8 and 9.  Both mother and infant are doing well postpartum and she is desiring discharge on her second postpartum day with her episiotomy healing well and good bowel function.  Her blood type is O+ rubella status is immune and she is breast-feeding.  Her hemoglobin is stable at 11.2..      Procedures Performed         Consults:   Consults     No orders found from 7/12/2019 to 8/11/2019.          Pertinent Test Results: As above    Condition on Discharge: Stable    Vital Signs  Temp:  [98.5 °F (36.9 °C)-98.7 °F (37.1 °C)] 98.5 °F (36.9 °C)  Heart Rate:  [70-76] 70  Resp:  [18] 18  BP: (112-121)/(65-72) 115/72    Physical Exam:   Vital signs stable.  Afebrile.  Lochia scant.Sutures healing well.    Discharge Disposition  Home    Discharge Medications     Discharge Medications      ASK your doctor about these medications      Instructions Start Date   acetaminophen 500 MG tablet  Commonly known as:  TYLENOL   500 mg, Oral, Every 6 Hours PRN      CITRANATAL ASSURE 35-1 & 300 MG tablet   1 tablet, Oral, Daily      fluticasone 50 MCG/ACT nasal spray  Commonly known as:  FLONASE   fluticasone propionate 50 mcg/actuation nasal spray,suspension   1-2 sprays each nare every day as needed for nasal congestion.      VENTOLIN  (90 Base) MCG/ACT inhaler  Generic drug:  albuterol sulfate HFA   INHALE 2 PUFFS INTO LUNGS PO Q 4 H PRN FOR WHEEZING OR SHORTNESS OF AIR             Discharge Diet:   reg  Activity at Discharge:   As tolerated  Follow-up  Appointments  6 weeks       Test Results Pending at Discharge       Jovany Enriquez MD  08/13/19  10:14 AM    Time: 1015

## 2019-08-13 NOTE — PROGRESS NOTES
Continued Stay Note  [facility]     Patient Name: Terri Gunter  MRN: [id number]  Today's Date: 8/13/2019    Admit Date: 8/10/2019    Discharge Plan     Row Name 08/13/19 0943       Plan    Plan  Infant to discharge home with mother. CSW will follow for cord toxicology results. JANET Macdonald    Plan Comments  Mother: Terri Gunter, MRN [id number]; Infant: Mateo Gunter, MRN [id number]. CSW spoke with Georgiana at CPS hotline who reports that report #[id number] was not accepted for investigation. CSW will follow for cord toxicology results and will report to CPS if warranted. JANET Macdonald        Discharge Codes    No documentation.             ZEYAD Macdonald

## 2019-08-15 ENCOUNTER — TELEPHONE (OUTPATIENT)
Dept: OBSTETRICS AND GYNECOLOGY | Facility: CLINIC | Age: 19
End: 2019-08-15

## 2019-08-15 ENCOUNTER — HOSPITAL ENCOUNTER (EMERGENCY)
Facility: HOSPITAL | Age: 19
Discharge: HOME | End: 2019-08-15
Attending: EMERGENCY MEDICINE | Admitting: EMERGENCY MEDICINE

## 2019-08-15 ENCOUNTER — APPOINTMENT (OUTPATIENT)
Dept: ULTRASOUND IMAGING | Facility: HOSPITAL | Age: 19
End: 2019-08-15

## 2019-08-15 VITALS
TEMPERATURE: 98.3 F | SYSTOLIC BLOOD PRESSURE: 116 MMHG | RESPIRATION RATE: 18 BRPM | OXYGEN SATURATION: 98 % | BODY MASS INDEX: 32.49 KG/M2 | DIASTOLIC BLOOD PRESSURE: 80 MMHG | HEART RATE: 74 BPM | WEIGHT: 195 LBS | HEIGHT: 65 IN

## 2019-08-15 DIAGNOSIS — N93.9 VAGINAL BLEEDING: Primary | ICD-10-CM

## 2019-08-15 LAB
ALBUMIN SERPL-MCNC: 3.9 G/DL (ref 3.5–5.2)
ALBUMIN/GLOB SERPL: 1.4 G/DL
ALP SERPL-CCNC: 146 U/L (ref 39–117)
ALT SERPL W P-5'-P-CCNC: 73 U/L (ref 1–33)
ANION GAP SERPL CALCULATED.3IONS-SCNC: 13.1 MMOL/L (ref 5–15)
AST SERPL-CCNC: 50 U/L (ref 1–32)
BASOPHILS # BLD AUTO: 0.03 10*3/MM3 (ref 0–0.2)
BASOPHILS NFR BLD AUTO: 0.3 % (ref 0–1.5)
BILIRUB SERPL-MCNC: 0.3 MG/DL (ref 0.2–1.2)
BILIRUB UR QL STRIP: NEGATIVE
BUN BLD-MCNC: 9 MG/DL (ref 6–20)
BUN/CREAT SERPL: 16.7 (ref 7–25)
CALCIUM SPEC-SCNC: 9 MG/DL (ref 8.6–10.5)
CHLORIDE SERPL-SCNC: 106 MMOL/L (ref 98–107)
CLARITY UR: CLEAR
CO2 SERPL-SCNC: 23.9 MMOL/L (ref 22–29)
COLOR UR: YELLOW
CREAT BLD-MCNC: 0.54 MG/DL (ref 0.57–1)
DEPRECATED RDW RBC AUTO: 44.4 FL (ref 37–54)
EOSINOPHIL # BLD AUTO: 0.21 10*3/MM3 (ref 0–0.4)
EOSINOPHIL NFR BLD AUTO: 1.9 % (ref 0.3–6.2)
ERYTHROCYTE [DISTWIDTH] IN BLOOD BY AUTOMATED COUNT: 12.7 % (ref 12.3–15.4)
GFR SERPL CREATININE-BSD FRML MDRD: >150 ML/MIN/1.73
GLOBULIN UR ELPH-MCNC: 2.7 GM/DL
GLUCOSE BLD-MCNC: 83 MG/DL (ref 65–99)
GLUCOSE UR STRIP-MCNC: NEGATIVE MG/DL
HCT VFR BLD AUTO: 35.6 % (ref 34–46.6)
HGB BLD-MCNC: 11.7 G/DL (ref 12–15.9)
HGB UR QL STRIP.AUTO: NEGATIVE
IMM GRANULOCYTES # BLD AUTO: 0.04 10*3/MM3 (ref 0–0.05)
IMM GRANULOCYTES NFR BLD AUTO: 0.4 % (ref 0–0.5)
KETONES UR QL STRIP: ABNORMAL
LEUKOCYTE ESTERASE UR QL STRIP.AUTO: NEGATIVE
LYMPHOCYTES # BLD AUTO: 2.15 10*3/MM3 (ref 0.7–3.1)
LYMPHOCYTES NFR BLD AUTO: 19.6 % (ref 19.6–45.3)
MCH RBC QN AUTO: 31.5 PG (ref 26.6–33)
MCHC RBC AUTO-ENTMCNC: 32.9 G/DL (ref 31.5–35.7)
MCV RBC AUTO: 95.7 FL (ref 79–97)
MONOCYTES # BLD AUTO: 0.76 10*3/MM3 (ref 0.1–0.9)
MONOCYTES NFR BLD AUTO: 6.9 % (ref 5–12)
NEUTROPHILS # BLD AUTO: 7.77 10*3/MM3 (ref 1.7–7)
NEUTROPHILS NFR BLD AUTO: 70.9 % (ref 42.7–76)
NITRITE UR QL STRIP: NEGATIVE
NRBC BLD AUTO-RTO: 0 /100 WBC (ref 0–0.2)
PH UR STRIP.AUTO: 7.5 [PH] (ref 5–8)
PLATELET # BLD AUTO: 206 10*3/MM3 (ref 140–450)
PMV BLD AUTO: 10.3 FL (ref 6–12)
POTASSIUM BLD-SCNC: 4 MMOL/L (ref 3.5–5.2)
PROT SERPL-MCNC: 6.6 G/DL (ref 6–8.5)
PROT UR QL STRIP: NEGATIVE
RBC # BLD AUTO: 3.72 10*6/MM3 (ref 3.77–5.28)
SODIUM BLD-SCNC: 143 MMOL/L (ref 136–145)
SP GR UR STRIP: 1.01 (ref 1–1.03)
UROBILINOGEN UR QL STRIP: ABNORMAL
WBC NRBC COR # BLD: 10.96 10*3/MM3 (ref 3.4–10.8)

## 2019-08-15 PROCEDURE — 25010000002 ONDANSETRON PER 1 MG: Performed by: EMERGENCY MEDICINE

## 2019-08-15 PROCEDURE — 76856 US EXAM PELVIC COMPLETE: CPT

## 2019-08-15 PROCEDURE — 25010000002 MORPHINE PER 10 MG: Performed by: EMERGENCY MEDICINE

## 2019-08-15 PROCEDURE — 96375 TX/PRO/DX INJ NEW DRUG ADDON: CPT

## 2019-08-15 PROCEDURE — 93976 VASCULAR STUDY: CPT

## 2019-08-15 PROCEDURE — 80053 COMPREHEN METABOLIC PANEL: CPT | Performed by: EMERGENCY MEDICINE

## 2019-08-15 PROCEDURE — 85025 COMPLETE CBC W/AUTO DIFF WBC: CPT | Performed by: EMERGENCY MEDICINE

## 2019-08-15 PROCEDURE — 81003 URINALYSIS AUTO W/O SCOPE: CPT | Performed by: EMERGENCY MEDICINE

## 2019-08-15 PROCEDURE — 96374 THER/PROPH/DIAG INJ IV PUSH: CPT

## 2019-08-15 PROCEDURE — 99284 EMERGENCY DEPT VISIT MOD MDM: CPT

## 2019-08-15 RX ORDER — MORPHINE SULFATE 2 MG/ML
4 INJECTION, SOLUTION INTRAMUSCULAR; INTRAVENOUS ONCE
Status: COMPLETED | OUTPATIENT
Start: 2019-08-15 | End: 2019-08-15

## 2019-08-15 RX ORDER — MISOPROSTOL 200 UG/1
200 TABLET ORAL 2 TIMES DAILY
Qty: 2 TABLET | Refills: 0 | Status: SHIPPED | OUTPATIENT
Start: 2019-08-15 | End: 2019-08-16

## 2019-08-15 RX ORDER — ONDANSETRON 2 MG/ML
4 INJECTION INTRAMUSCULAR; INTRAVENOUS ONCE
Status: COMPLETED | OUTPATIENT
Start: 2019-08-15 | End: 2019-08-15

## 2019-08-15 RX ORDER — MISOPROSTOL 200 UG/1
200 TABLET ORAL 2 TIMES DAILY
Qty: 2 TABLET | Refills: 0 | Status: SHIPPED | OUTPATIENT
Start: 2019-08-15 | End: 2019-08-15

## 2019-08-15 RX ADMIN — SODIUM CHLORIDE 1000 ML: 9 INJECTION, SOLUTION INTRAVENOUS at 13:30

## 2019-08-15 RX ADMIN — ONDANSETRON 4 MG: 2 INJECTION INTRAMUSCULAR; INTRAVENOUS at 13:31

## 2019-08-15 RX ADMIN — MORPHINE SULFATE 4 MG: 2 INJECTION, SOLUTION INTRAMUSCULAR; INTRAVENOUS at 13:32

## 2019-08-15 NOTE — TELEPHONE ENCOUNTER
PT called, had a vaginal delivery back on 8-11-19. PT states she is in unbearable pain and medication is not helping. PT informed if she is in that severe of pain to go to [facility]. PT agrees

## 2019-08-15 NOTE — PROGRESS NOTES
Continued Stay Note  [facility]     Patient Name: Terri Gunter  MRN: [id number]  Today's Date: 8/15/2019    Admit Date: 8/10/2019    Discharge Plan     Row Name 08/15/19 0920       Plan    Plan Comments  CSW received cord results. Infant cord toxicology results are negative. Referral to CPS not warranted; no other issues or concerns noted. JANET Christianson         Discharge Codes    No documentation.       Expected Discharge Date and Time     Expected Discharge Date Expected Discharge Time    Aug 13, 2019             ZEYAD Christianson

## 2019-08-15 NOTE — ED PROVIDER NOTES
EMERGENCY DEPARTMENT ENCOUNTER    Room Number:  44/44  PCP: Provider, No Known  Historian: Pt  History Limited By: None  Ob/gyn Dr. Eber RADFORD  Chief Complaint: Lower abd pain  Context: Terri Gunter is a 18 y.o. female who with 4 days s/p vaginal delivery presents to the ED c/o lower abd pain and vaginal bleeding that worsened today. Pt states she is bleeding through 1 pad an hour. She describes her pain as cramping in nature. Pt denies fever, CP, SOA, and N/V/D. Pt delivered at 39 weeks 4 days. Pt states she had an uncomplicated pregnancy and delivery. She is A0. She is currently breastfeeding.       Location: lower abd  Radiation: none   Character: cramping   Duration: worsened today  Severity: moderate   Progression: worsening   Aggravating Factors: none  Alleviating Factors: none        MEDICAL RECORD REVIEW    Pt was discharged on 19 after an uncomplicated vaginal delivery.           PAST MEDICAL HISTORY  Active Ambulatory Problems     Diagnosis Date Noted   • Pregnancy 2019   •  (normal spontaneous vaginal delivery) 2019     Resolved Ambulatory Problems     Diagnosis Date Noted   • Encounter for supervision of normal first pregnancy in third trimester 2017   • Screen for STD (sexually transmitted disease) 2017   • Screening for HIV (human immunodeficiency virus) 2017   • Screening for sickle-cell disease or trait 2017   • Cystic fibrosis screening 2017   • Encounter for drug screening 2017   • Chlamydia infection affecting pregnancy in first trimester, antepartum 2017   • Screening for diabetes mellitus 2017   • Decreased fetus movements affecting management of mother in third trimester 2017   • Asthma affecting pregnancy, antepartum 2017   • Need for Tdap vaccination 2017   • Group B Streptococcus carrier, +RV culture, currently pregnant 2017   • Pregnancy 2017   • Pregnancy 09/15/2017   • Full-term  premature rupture of membranes with onset of labor within 24 hours of rupture 2017   • Vaginal delivery 2017   • Chlamydia infection 2017   • Chlamydia contact, treated 2017   • Birth control counseling 2017   • Encounter for initial prescription of contraceptive pills 2017   • Multigravida in first trimester 2018   • Screen for STD (sexually transmitted disease) 2018   • Screening for HIV (human immunodeficiency virus) 2018   • Encounter for drug screening 2018   • Nausea and vomiting in pregnancy 2018   • Asthma affecting pregnancy in first trimester 2018   • Encounter for  screening for chromosomal anomalies 2019   • Multigravida in second trimester 2019   • Screening for diabetes mellitus 2019   • Excessive weight gain during pregnancy in third trimester 05/15/2019   • Maternal care for breech presentation, single gestation 2019   • Breech presentation 2019   • Pregnancy 2019   • Maternal obesity affecting pregnancy, antepartum 08/10/2019     Past Medical History:   Diagnosis Date   • Asthma    • Multigravida in first trimester 2018         PAST SURGICAL HISTORY  Past Surgical History:   Procedure Laterality Date   • WISDOM TOOTH EXTRACTION           FAMILY HISTORY  Family History   Problem Relation Age of Onset   • Diabetes Father          SOCIAL HISTORY  Social History     Socioeconomic History   • Marital status: Single     Spouse name: Not on file   • Number of children: Not on file   • Years of education: Not on file   • Highest education level: Not on file   Tobacco Use   • Smoking status: Former Smoker   • Smokeless tobacco: Never Used   Substance and Sexual Activity   • Alcohol use: No   • Drug use: No   • Sexual activity: Yes     Partners: Male         ALLERGIES  Patient has no known allergies.        REVIEW OF SYSTEMS  Review of Systems   Constitutional: Negative for fever.   HENT:  Negative for sore throat.    Eyes: Negative.    Respiratory: Negative for cough and shortness of breath.    Cardiovascular: Negative for chest pain.   Gastrointestinal: Positive for abdominal pain (lower). Negative for diarrhea, nausea and vomiting.   Genitourinary: Positive for vaginal bleeding. Negative for dysuria.   Musculoskeletal: Negative for neck pain.   Skin: Negative for rash.   Allergic/Immunologic: Negative.    Neurological: Negative for weakness, numbness and headaches.   Hematological: Negative.    Psychiatric/Behavioral: Negative.    All other systems reviewed and are negative.           PHYSICAL EXAM  ED Triage Vitals   Temp Heart Rate Resp BP SpO2   08/15/19 1248 08/15/19 1248 08/15/19 1248 08/15/19 1254 08/15/19 1248   98.3 °F (36.8 °C) 98 18 132/88 98 %      Temp src Heart Rate Source Patient Position BP Location FiO2 (%)   08/15/19 1248 08/15/19 1248 -- -- --   Tympanic Monitor          Physical Exam   Constitutional: She is oriented to person, place, and time. No distress.   HENT:   Head: Normocephalic and atraumatic.   Eyes: EOM are normal. Pupils are equal, round, and reactive to light.   Neck: Normal range of motion. Neck supple.   Cardiovascular: Normal rate, regular rhythm and normal heart sounds.   Pulmonary/Chest: Effort normal and breath sounds normal. No respiratory distress.   Abdominal: Soft. There is tenderness (lower, mild). There is no rebound and no guarding.   Genitourinary: Cervix normal, right adnexa normal and left adnexa normal.   Genitourinary Comments: Mild amount of blood in vagina   Musculoskeletal: Normal range of motion. She exhibits no edema.   Neurological: She is alert and oriented to person, place, and time. She has normal sensation and normal strength.   Skin: Skin is warm and dry. No rash noted.   Psychiatric: Mood and affect normal.   Nursing note and vitals reviewed.          LAB RESULTS  Recent Results (from the past 24 hour(s))   Comprehensive Metabolic Panel     Collection Time: 08/15/19  1:32 PM   Result Value Ref Range    Glucose 83 65 - 99 mg/dL    BUN 9 6 - 20 mg/dL    Creatinine 0.54 (L) 0.57 - 1.00 mg/dL    Sodium 143 136 - 145 mmol/L    Potassium 4.0 3.5 - 5.2 mmol/L    Chloride 106 98 - 107 mmol/L    CO2 23.9 22.0 - 29.0 mmol/L    Calcium 9.0 8.6 - 10.5 mg/dL    Total Protein 6.6 6.0 - 8.5 g/dL    Albumin 3.90 3.50 - 5.20 g/dL    ALT (SGPT) 73 (H) 1 - 33 U/L    AST (SGOT) 50 (H) 1 - 32 U/L    Alkaline Phosphatase 146 (H) 39 - 117 U/L    Total Bilirubin 0.3 0.2 - 1.2 mg/dL    eGFR  African Amer >150 >60 mL/min/1.73    Globulin 2.7 gm/dL    A/G Ratio 1.4 g/dL    BUN/Creatinine Ratio 16.7 7.0 - 25.0    Anion Gap 13.1 5.0 - 15.0 mmol/L   CBC Auto Differential    Collection Time: 08/15/19  1:32 PM   Result Value Ref Range    WBC 10.96 (H) 3.40 - 10.80 10*3/mm3    RBC 3.72 (L) 3.77 - 5.28 10*6/mm3    Hemoglobin 11.7 (L) 12.0 - 15.9 g/dL    Hematocrit 35.6 34.0 - 46.6 %    MCV 95.7 79.0 - 97.0 fL    MCH 31.5 26.6 - 33.0 pg    MCHC 32.9 31.5 - 35.7 g/dL    RDW 12.7 12.3 - 15.4 %    RDW-SD 44.4 37.0 - 54.0 fl    MPV 10.3 6.0 - 12.0 fL    Platelets 206 140 - 450 10*3/mm3    Neutrophil % 70.9 42.7 - 76.0 %    Lymphocyte % 19.6 19.6 - 45.3 %    Monocyte % 6.9 5.0 - 12.0 %    Eosinophil % 1.9 0.3 - 6.2 %    Basophil % 0.3 0.0 - 1.5 %    Immature Grans % 0.4 0.0 - 0.5 %    Neutrophils, Absolute 7.77 (H) 1.70 - 7.00 10*3/mm3    Lymphocytes, Absolute 2.15 0.70 - 3.10 10*3/mm3    Monocytes, Absolute 0.76 0.10 - 0.90 10*3/mm3    Eosinophils, Absolute 0.21 0.00 - 0.40 10*3/mm3    Basophils, Absolute 0.03 0.00 - 0.20 10*3/mm3    Immature Grans, Absolute 0.04 0.00 - 0.05 10*3/mm3    nRBC 0.0 0.0 - 0.2 /100 WBC   Urinalysis With Microscopic If Indicated (No Culture) - Urine, Catheter    Collection Time: 08/15/19  3:04 PM   Result Value Ref Range    Color, UA Yellow Yellow, Straw    Appearance, UA Clear Clear    pH, UA 7.5 5.0 - 8.0    Specific Gravity, UA 1.008 1.005 - 1.030     Glucose, UA Negative Negative    Ketones, UA Trace (A) Negative    Bilirubin, UA Negative Negative    Blood, UA Negative Negative    Protein, UA Negative Negative    Leuk Esterase, UA Negative Negative    Nitrite, UA Negative Negative    Urobilinogen, UA 0.2 E.U./dL 0.2 - 1.0 E.U./dL       Ordered the above labs and reviewed the results.        RADIOLOGY  US Testicular or Ovarian Vascular Limited    (Results Pending)   US Pelvis Complete    (Results Pending)        Ordered the above noted radiological studies. Reviewed by me in PACS.       PROCEDURES  Procedures      MEDICATIONS GIVEN IN ER  Medications   sodium chloride 0.9 % bolus 1,000 mL (0 mL Intravenous Stopped 8/15/19 1505)   morphine injection 4 mg (4 mg Intravenous Given 8/15/19 1332)   ondansetron (ZOFRAN) injection 4 mg (4 mg Intravenous Given 8/15/19 1331)             PROGRESS AND CONSULTS  ED Course as of Aug 15 1658   Thu Aug 15, 2019   1649 4:49 PM  Patient here for post partem bleeding.  States bleeding is better.  No retained products on ultrasound.  Discussed with Dr. Camp who states we can give cytotec.  Will give two doses for home.  She states she will not take it unless it gets worse again.  [SL]      ED Course User Index  [SL] Russ Ordaz MD   1:15 PM  Labs and pelvis US ordered. Zofran and Morphine ordered for pain.     2:50 PM  US tech reports pelvic US shows ne retained products.     2:58 PM  Rechecked pt who is resting in NAD. Informed pt US shows no retained products. Her hemoglobin is stable. Pt states her vaginal bleeding has decreased some. Pelvis exam performed with female nurse at bedside. There is a mild amount of bleeding in the vaginal vault, no obvious tear seen.   Consult placed to ob/gyn.     4:16 PM  Discussed pt case with Dr. Camp, ob/gyn. He would like me to offer Cytotec to the pt and states she can be discharged and f/u in office.   Spoke with pt. She agrees with discharge plan. Pt understands and agrees with  the plan, all questions answered.    4:49 PM  Spoke with [facility] pharmacist. I will discharge pt with 2 doses of 400mg Cytotec to take today if needed. It is okay to breastfeed while taking this medication.         MEDICAL DECISION MAKING      MDM  Number of Diagnoses or Management Options     Amount and/or Complexity of Data Reviewed  Clinical lab tests: ordered and reviewed (Hemoglobin-11.7)  Tests in the radiology section of CPT®: ordered and reviewed (Pelvis US-no retained products)  Decide to obtain previous medical records or to obtain history from someone other than the patient: yes  Review and summarize past medical records: yes               DIAGNOSIS  Final diagnoses:   Vaginal bleeding           DISPOSITION  DISCHARGE    Patient discharged in stable condition.    Reviewed implications of results, diagnosis, meds, responsibility to follow up, warning signs and symptoms of possible worsening, potential complications and reasons to return to ER.    Patient/Family voiced understanding of above instructions.    Discussed plan for discharge, as there is no emergent indication for admission. Patient referred to primary care provider for BP management due to today's BP. Pt/family is agreeable and understands need for follow up and repeat testing.  Pt is aware that discharge does not mean that nothing is wrong but it indicates no emergency is present that requires admission and they must continue care with follow-up as given below or physician of their choice.     FOLLOW-UP  Jose Camp MD  [address] [place], [place] [id number] [phone]    Schedule an appointment as soon as possible for a visit            Medication List      New Prescriptions    misoprostol 200 MCG tablet  Commonly known as:  CYTOTEC  Take 1 tablet by mouth 2 (Two) Times a Day for 2 doses.              Latest Documented Vital Signs:  As of 4:58 PM  BP- 132/88 HR- 98 Temp- 98.3 °F (36.8 °C) (Tympanic) O2 sat-  98%        --  Documentation assistance provided by rachelle Gordon for Dr. Sushma MD.  Information recorded by the scribe was done at my direction and has been verified and validated by me.       Yeimy Gordon  08/15/19 0479       Russ Ordaz MD  08/15/19 2689

## 2019-08-15 NOTE — ED NOTES
Pt is 4days postpartum with vaginal delivery. Reports no complication with delivery. Pt c/o pelvic cramping, vaginal pain, and increase in bleeding. Bleeding is bright red. Reports that it is filling a pad approx every hour. Denies fever     Luma Burch, RN  08/15/19 3056

## 2019-08-19 ENCOUNTER — TELEPHONE (OUTPATIENT)
Dept: OBSTETRICS AND GYNECOLOGY | Facility: CLINIC | Age: 19
End: 2019-08-19

## 2019-08-19 NOTE — TELEPHONE ENCOUNTER
If her pain is not controlled with her norco and motrin, she probably needs to be seen in ED again.  I can't offer her anything stronger than those medications in the office.

## 2019-08-19 NOTE — TELEPHONE ENCOUNTER
Patient had vaginal delivery on 8/11/19.  States was seen in ER on 8/15/19 for pain and bleeding.  Patient states she was given Norco and Motrin and neither are controlling her pain.  I scheduled her to see you tomorrow since her pain isn't controlled - is this ok?  Or would you advise something else?  Thanks.

## 2019-09-27 ENCOUNTER — TELEPHONE (OUTPATIENT)
Dept: OBSTETRICS AND GYNECOLOGY | Facility: CLINIC | Age: 19
End: 2019-09-27

## 2019-11-19 ENCOUNTER — OFFICE VISIT (OUTPATIENT)
Dept: OBSTETRICS AND GYNECOLOGY | Facility: CLINIC | Age: 19
End: 2019-11-19

## 2019-11-19 VITALS
SYSTOLIC BLOOD PRESSURE: 116 MMHG | BODY MASS INDEX: 29.82 KG/M2 | WEIGHT: 179 LBS | HEART RATE: 72 BPM | HEIGHT: 65 IN | DIASTOLIC BLOOD PRESSURE: 70 MMHG

## 2019-11-19 DIAGNOSIS — Z11.3 SCREEN FOR STD (SEXUALLY TRANSMITTED DISEASE): ICD-10-CM

## 2019-11-19 PROCEDURE — 0503F POSTPARTUM CARE VISIT: CPT | Performed by: OBSTETRICS & GYNECOLOGY

## 2019-11-19 NOTE — PROGRESS NOTES
Subjective   Terri Gunter is a 18 y.o. female. Patient here for postpartum exam.  Delivered 8/11/19 vag; male- 7lbs-11oz; bottlefeeding. Patient would like to discuss birth control options.    History of Present Illness   Patient is a 18-year-old who is 3 months status post normal spontaneous vaginal delivery at term.  Patient's pregnancy was uncomplicated.  She has no complaints today.  Patient has returned to work.  She is bottlefeeding.  Patient states that her cycles have resumed and have been regular.  She denies any concerns for postpartum depression or anxiety.    The following portions of the patient's history were reviewed and updated as appropriate: allergies, current medications, past family history, past medical history, past social history, past surgical history and problem list.    Review of Systems   Genitourinary: Negative for menstrual problem and pelvic pain.   All other systems reviewed and are negative.      Objective   Physical Exam   Constitutional: She appears well-developed and well-nourished.   Cardiovascular: Normal rate and regular rhythm.   Pulmonary/Chest: Effort normal and breath sounds normal. Right breast exhibits no inverted nipple, no mass, no nipple discharge, no skin change and no tenderness. Left breast exhibits no inverted nipple, no mass, no nipple discharge, no skin change and no tenderness.   Abdominal: Soft. She exhibits no distension. There is no tenderness.   Genitourinary: Vagina normal, uterus normal and cervix normal. There is no rash, tenderness, lesion or injury on the right labia. There is no rash, tenderness, lesion or injury on the left labia. Right adnexum displays no mass, no tenderness and no fullness. Left adnexum displays no mass, no tenderness and no fullness.   Neurological: She is alert.   Psychiatric: She has a normal mood and affect.   Vitals reviewed.        Assessment/Plan   Terri was seen today for gynecologic exam.    Diagnoses and all orders  for this visit:    Routine postpartum follow-up    Screen for STD (sexually transmitted disease)  -     Chlamydia trachomatis, Neisseria gonorrhoeae, Trichomonas vaginalis, PCR - Swab, Vagina    Patient may resume normal activity with no restrictions.  She is doing well postpartum.  Patient wishes to proceed with Mirena IUD for contraception and she will return in 1 week for insertion procedure.

## 2019-11-21 ENCOUNTER — TELEPHONE (OUTPATIENT)
Dept: OBSTETRICS AND GYNECOLOGY | Facility: CLINIC | Age: 19
End: 2019-11-21

## 2019-11-21 LAB
C TRACH RRNA SPEC QL NAA+PROBE: NEGATIVE
N GONORRHOEA RRNA SPEC QL NAA+PROBE: NEGATIVE
T VAGINALIS DNA SPEC QL NAA+PROBE: NEGATIVE

## 2019-11-26 ENCOUNTER — PROCEDURE VISIT (OUTPATIENT)
Dept: OBSTETRICS AND GYNECOLOGY | Facility: CLINIC | Age: 19
End: 2019-11-26

## 2019-11-26 VITALS
HEIGHT: 65 IN | BODY MASS INDEX: 29.82 KG/M2 | DIASTOLIC BLOOD PRESSURE: 66 MMHG | SYSTOLIC BLOOD PRESSURE: 114 MMHG | WEIGHT: 179 LBS

## 2019-11-26 DIAGNOSIS — Z30.430 ENCOUNTER FOR INSERTION OF MIRENA IUD: Primary | ICD-10-CM

## 2019-11-26 LAB
B-HCG UR QL: NEGATIVE
INTERNAL NEGATIVE CONTROL: NEGATIVE
INTERNAL POSITIVE CONTROL: POSITIVE
Lab: NORMAL

## 2019-11-26 PROCEDURE — 58300 INSERT INTRAUTERINE DEVICE: CPT | Performed by: OBSTETRICS & GYNECOLOGY

## 2019-11-26 PROCEDURE — 81025 URINE PREGNANCY TEST: CPT | Performed by: OBSTETRICS & GYNECOLOGY

## 2019-11-26 NOTE — PROGRESS NOTES
Patient here for Mirena insertion. UCG-negative. Ella  NDC 73015-198-86  LOT [id number]  EXP 1/22    Procedure: Mirena Intrauterine device insertion  Preoperative diagnosis: Desires long acting reversible contraception  Postoperative diagnosis: Same  Indications:  Patient counseled on all forms of birth control and discussed their risks, side effects, and bleeding profiles.  Patient has elected to try Mirena.  Findings: Small, mobile anteverted uterus  Anesthesia: None  Pathology: None  Estimated blood loss: Less than 5 mL  Complications: None    The risks, benefits, and alternatives to Mirena were explained at length with the patient. All her questions were answered and consents were signed.  The patient was placed in a dorsal lithotomy position on the examining table in [facility]. A bimanual exam confirmed the uterus was normal in size, anteverted, and midplane. A warmed metal speculum was inserted into the vagina and the cervix was brought into view. The cervix was prepped with Betadine. The anterior lip was grasped with a single-tooth tenaculum. The endometrial cavity was then sounded to 7 cm without use of a dilator. Gloves were then exchanged for sterile gloves. This sealed Mirena package was opened and the Mirena was removed in a sterile fashion.  The upper edge of the depth setting the flange was set at a uterine sound measured. The  was then carefully advanced to the cervical canal into the uterus to the level of the fundus. This was then backed off about 1.5-2 cm to allow sufficient space for the arms to open. The slider was then retracted about 1 cm and deployed the device. The device was then gently advanced to the fundus. The Mirena was then released by pulling the slider down all the way. The  was removed carefully from the uterus. The threads were then cut leaving 2-3 cm visible outside of the cervix.  The single-tooth tenaculum was removed from the anterior lip. Silver nitrate was  applied to the tenaculum sites. Good hemostasis was noted. All other instruments were removed from the vagina. There were no complications, and the patient tolerated the procedure well with a minimal amount of discomfort. The patient was counseled about the need to return in 4 weeks for string check. She was counseled about the need to use a backup method of contraception such as condoms until her post insertion exam was performed. The patient verbalized understanding that the Mirena will need to be removed/replaced after 5 years. The patient is counseled to contact us if she has any significant or increasing bleeding, pain, fever, chills, or other concerns. She is instructed to see a doctor right away if she believes that she may be pregnant at any time with the Mirena in place.

## 2019-12-26 ENCOUNTER — TELEPHONE (OUTPATIENT)
Dept: OBSTETRICS AND GYNECOLOGY | Facility: CLINIC | Age: 19
End: 2019-12-26

## 2020-01-06 ENCOUNTER — TELEPHONE (OUTPATIENT)
Dept: OBSTETRICS AND GYNECOLOGY | Facility: CLINIC | Age: 20
End: 2020-01-06

## 2020-06-19 ENCOUNTER — TELEPHONE (OUTPATIENT)
Dept: OBSTETRICS AND GYNECOLOGY | Facility: CLINIC | Age: 20
End: 2020-06-19

## 2020-12-04 ENCOUNTER — OFFICE VISIT (OUTPATIENT)
Dept: OBSTETRICS AND GYNECOLOGY | Facility: CLINIC | Age: 20
End: 2020-12-04

## 2020-12-04 VITALS
SYSTOLIC BLOOD PRESSURE: 112 MMHG | BODY MASS INDEX: 25.16 KG/M2 | HEART RATE: 73 BPM | WEIGHT: 151.2 LBS | DIASTOLIC BLOOD PRESSURE: 71 MMHG

## 2020-12-04 DIAGNOSIS — R10.2 PELVIC PAIN: ICD-10-CM

## 2020-12-04 DIAGNOSIS — Z11.3 SCREEN FOR STD (SEXUALLY TRANSMITTED DISEASE): ICD-10-CM

## 2020-12-04 DIAGNOSIS — Z01.419 ENCOUNTER FOR GYNECOLOGICAL EXAMINATION: Primary | ICD-10-CM

## 2020-12-04 DIAGNOSIS — Z97.5 IUD (INTRAUTERINE DEVICE) IN PLACE: ICD-10-CM

## 2020-12-04 PROCEDURE — 99395 PREV VISIT EST AGE 18-39: CPT | Performed by: OBSTETRICS & GYNECOLOGY

## 2020-12-04 NOTE — PROGRESS NOTES
Subjective   Terri Gunter is a 19 y.o. female here for pelvic pain with cramping and annual exam.  Pap- 2017    History of Present Illness   Patient is a 19-year-old female that presents for annual gynecological exam.  Patient also complains of occasional cramping.  She is unable to quantify how long it has been going on.  She states it happens a couple times a week and will typically resolve with change in position.  Patient has had a Mirena IUD in for 1 year and does report that she continues to have cycles.  Patient is currently sexually active.  She denies any concerns for STDs.  She denies any problems with constipation and/or diarrhea.  The following portions of the patient's history were reviewed and updated as appropriate: allergies, current medications, past family history, past medical history, past social history, past surgical history and problem list.    Review of Systems   Genitourinary: Positive for pelvic pain. Negative for menstrual problem.   All other systems reviewed and are negative.      Objective   Physical Exam  Vitals signs reviewed. Exam conducted with a chaperone present.   Constitutional:       Appearance: She is well-developed.   Cardiovascular:      Rate and Rhythm: Normal rate and regular rhythm.   Pulmonary:      Effort: Pulmonary effort is normal.      Breath sounds: Normal breath sounds.   Chest:      Breasts:         Right: No inverted nipple, mass, nipple discharge, skin change or tenderness.         Left: No inverted nipple, mass, nipple discharge, skin change or tenderness.   Abdominal:      General: There is no distension.      Palpations: Abdomen is soft.      Tenderness: There is no abdominal tenderness.   Genitourinary:     Labia:         Right: No rash, tenderness, lesion or injury.         Left: No rash, tenderness, lesion or injury.       Vagina: Normal.      Cervix: Normal.      Uterus: Normal.       Adnexa:         Right: No mass, tenderness or fullness.           Left: No mass, tenderness or fullness.        Comments: IUD strings visualized  Neurological:      Mental Status: She is alert.           Assessment/Plan   Diagnoses and all orders for this visit:    1. Encounter for gynecological examination (Primary)  -     IGP, Rfx Aptima HPV ASCU    2. Pelvic pain  -     NuSwab VG+ - Swab, Vagina  -     US Non-ob Transvaginal; Future    3. IUD (intrauterine device) in place  -     US Non-ob Transvaginal; Future    4. Screen for STD (sexually transmitted disease)  -     RPR, Rfx Qn RPR / Confirm TP  -     Hepatitis B Surface Antigen  -     Hepatitis C Antibody  -     HIV-1 / O / 2 Ag / Antibody 4th Generation        Patient was counseled on monthly self breast exams for breast health.  Patient did not have any tenderness on exam today.  Pelvic culture was obtained and ultrasound was ordered to evaluate position of the IUD.  Screening Pap smear was collected.  She may follow-up in 1 year for next annual gynecological exam or sooner if needed.

## 2020-12-05 LAB
HBV SURFACE AG SERPL QL IA: NEGATIVE
HCV AB S/CO SERPL IA: <0.1 S/CO RATIO (ref 0–0.9)
HIV 1+2 AB+HIV1 P24 AG SERPL QL IA: NON REACTIVE

## 2020-12-06 LAB
A VAGINAE DNA VAG QL NAA+PROBE: NORMAL SCORE
BVAB2 DNA VAG QL NAA+PROBE: NORMAL SCORE
C ALBICANS DNA VAG QL NAA+PROBE: NEGATIVE
C GLABRATA DNA VAG QL NAA+PROBE: NEGATIVE
C TRACH DNA VAG QL NAA+PROBE: NEGATIVE
MEGA1 DNA VAG QL NAA+PROBE: NORMAL SCORE
N GONORRHOEA DNA VAG QL NAA+PROBE: NEGATIVE
T VAGINALIS DNA VAG QL NAA+PROBE: NEGATIVE

## 2020-12-07 LAB
CONV .: NORMAL
CYTOLOGIST CVX/VAG CYTO: NORMAL
CYTOLOGY CVX/VAG DOC CYTO: NORMAL
CYTOLOGY CVX/VAG DOC THIN PREP: NORMAL
DX ICD CODE: NORMAL
HIV 1 & 2 AB SER-IMP: NORMAL
OTHER STN SPEC: NORMAL
STAT OF ADQ CVX/VAG CYTO-IMP: NORMAL

## 2020-12-08 ENCOUNTER — TELEPHONE (OUTPATIENT)
Dept: OBSTETRICS AND GYNECOLOGY | Facility: CLINIC | Age: 20
End: 2020-12-08

## 2020-12-08 LAB — TREPONEMA PALLIDUM IGG+IGM AB [PRESENCE] IN SERUM OR PLASMA BY IMMUNOASSAY: NON REACTIVE

## 2020-12-08 NOTE — TELEPHONE ENCOUNTER
----- Message from Emili Riojas MD sent at 12/8/2020  9:21 AM EST -----  Please let patient know that her vaginal culture was negative for infection and STD labs were negative.

## 2021-04-16 ENCOUNTER — BULK ORDERING (OUTPATIENT)
Dept: CASE MANAGEMENT | Facility: OTHER | Age: 21
End: 2021-04-16

## 2021-04-16 DIAGNOSIS — Z23 IMMUNIZATION DUE: ICD-10-CM

## 2021-07-16 NOTE — PROGRESS NOTES
Patient called requesting a extension on her medical necessity form. She will have the electric company fax over the forms. She is requesting a 30 day extension and would like to please be notified when this is ready. tw

## 2022-04-15 ENCOUNTER — OFFICE VISIT (OUTPATIENT)
Dept: OBSTETRICS AND GYNECOLOGY | Facility: CLINIC | Age: 22
End: 2022-04-15

## 2022-04-15 VITALS
HEART RATE: 69 BPM | BODY MASS INDEX: 40.44 KG/M2 | WEIGHT: 243 LBS | DIASTOLIC BLOOD PRESSURE: 73 MMHG | SYSTOLIC BLOOD PRESSURE: 107 MMHG

## 2022-04-15 DIAGNOSIS — Z30.432 ENCOUNTER FOR IUD REMOVAL: ICD-10-CM

## 2022-04-15 DIAGNOSIS — Z30.011 ENCOUNTER FOR INITIAL PRESCRIPTION OF CONTRACEPTIVE PILLS: ICD-10-CM

## 2022-04-15 DIAGNOSIS — Z11.3 SCREEN FOR STD (SEXUALLY TRANSMITTED DISEASE): ICD-10-CM

## 2022-04-15 DIAGNOSIS — Z01.419 ENCOUNTER FOR GYNECOLOGICAL EXAMINATION: Primary | ICD-10-CM

## 2022-04-15 PROBLEM — Z34.90 PREGNANCY: Status: RESOLVED | Noted: 2019-07-29 | Resolved: 2022-04-15

## 2022-04-15 PROCEDURE — 58301 REMOVE INTRAUTERINE DEVICE: CPT | Performed by: OBSTETRICS & GYNECOLOGY

## 2022-04-15 PROCEDURE — 2014F MENTAL STATUS ASSESS: CPT | Performed by: OBSTETRICS & GYNECOLOGY

## 2022-04-15 PROCEDURE — 99395 PREV VISIT EST AGE 18-39: CPT | Performed by: OBSTETRICS & GYNECOLOGY

## 2022-04-15 PROCEDURE — 3008F BODY MASS INDEX DOCD: CPT | Performed by: OBSTETRICS & GYNECOLOGY

## 2022-04-15 RX ORDER — NORGESTIMATE AND ETHINYL ESTRADIOL 0.25-0.035
1 KIT ORAL DAILY
Qty: 84 TABLET | Refills: 3 | Status: SHIPPED | OUTPATIENT
Start: 2022-04-15 | End: 2023-01-03

## 2022-04-15 NOTE — PROGRESS NOTES
Chief Complaint  Annual Exam and Procedure    Subjective          Terri Gunter presents to [facility]  History of Present Illness  Patient is a 21-year-old that presents for gynecological exam.  She has had a Mirena IUD in for 3 years and desires removal.  She states that she wishes for resumption of her menstrual cycles and also has felt breast tenderness with the IUD.  She is interested in the copper IUD.  She would like a prescription for birth control pills in the meantime.    Objective   Vital Signs:   /73   Pulse 69   Wt 110 kg (243 lb)   BMI 40.44 kg/m²     BMI has not been calculated during today's encounter.       Physical Exam  Vitals reviewed. Exam conducted with a chaperone present.   Constitutional:       Appearance: She is well-developed.   Cardiovascular:      Rate and Rhythm: Normal rate and regular rhythm.   Pulmonary:      Effort: Pulmonary effort is normal.      Breath sounds: Normal breath sounds.   Chest:   Breasts:      Right: No inverted nipple, mass, nipple discharge, skin change or tenderness.      Left: No inverted nipple, mass, nipple discharge, skin change or tenderness.       Abdominal:      General: There is no distension.      Palpations: Abdomen is soft.      Tenderness: There is no abdominal tenderness.   Genitourinary:     Labia:         Right: No rash, tenderness, lesion or injury.         Left: No rash, tenderness, lesion or injury.       Vagina: Normal.      Cervix: Normal.      Uterus: Normal.       Adnexa:         Right: No mass, tenderness or fullness.          Left: No mass, tenderness or fullness.     Neurological:      Mental Status: She is alert.        Result Review :                 Assessment and Plan    Diagnoses and all orders for this visit:    1. Encounter for gynecological examination (Primary)    2. Encounter for initial prescription of contraceptive pills  -     norgestimate-ethinyl estradiol (Sprintec 28) 0.25-35 MG-MCG per  tablet; Take 1 tablet by mouth Daily for 28 days.  Dispense: 84 tablet; Refill: 3    3. Screen for STD (sexually transmitted disease)  -     RPR, Rfx Qn RPR / Confirm TP  -     Hepatitis B Surface Antigen  -     Hepatitis C Antibody  -     HIV-1 / O / 2 Ag / Antibody 4th Generation  -     Chlamydia trachomatis, Neisseria gonorrhoeae, Trichomonas vaginalis, PCR - Swab, Vagina    4. Encounter for IUD removal    She was counseled on monthly self breast exams for breast health.  Her IUD was removed and prescription was sent for birth control pills.  She was advised that she can start them today or wait until her next menstrual cycle, but will need condoms for the first 2 weeks after starting them.  She was given handout information on the ParaGard IUD.  She may follow-up in 1 year for next gynecological exam or sooner if needed.    Follow Up   Return in about 1 year (around 4/15/2023) for gynecological exam.  Patient was given instructions and counseling regarding her condition or for health maintenance advice. Please see specific information pulled into the AVS if appropriate.     Procedure: Mirena Intrauterine device removal  Preoperative diagnosis: Desires removal  Postoperative diagnosis: Same  Indications: Patient desires removal and would like to switch contraception  Anesthesia: None  Pathology: None  Estimated blood loss: Less than 5 mL  Complications: None    Procedure in detail:  Patient placed in lithotomy position in stirrups. The [place] speculum placed into vagina. Cervix was visualized. IUD strings were seen at the external os. Strings were grasped with a ring forceps and the IUD was easily removed with slow, steady, gentle tension. IUD was removed intact and discarded. No complications. Patient tolerated the procedure well. She was instructed to use condoms as a backup method for the first month if not attempting to conceive.

## 2022-04-16 LAB
HBV SURFACE AG SERPL QL IA: NEGATIVE
HCV AB S/CO SERPL IA: <0.1 S/CO RATIO (ref 0–0.9)
HIV 1+2 AB+HIV1 P24 AG SERPL QL IA: NON REACTIVE
RPR SER QL: NON REACTIVE

## 2022-06-16 ENCOUNTER — TELEPHONE (OUTPATIENT)
Dept: OBSTETRICS AND GYNECOLOGY | Facility: CLINIC | Age: 22
End: 2022-06-16

## 2022-06-16 NOTE — TELEPHONE ENCOUNTER
Caller: MORALES    CALLING FROM DR. GIORGIO STEWART'S OFFICE    Best call back number: [phone]    What form or medical record are you requesting:    PROG NOTES & PAP RESULTS FROM 12/04/2020 & 4/15/2022    FAX [phone]    Additional notes: MORALES REQUESTED MED RECORDS

## 2023-01-03 ENCOUNTER — OFFICE VISIT (OUTPATIENT)
Dept: OBSTETRICS AND GYNECOLOGY | Facility: CLINIC | Age: 23
End: 2023-01-03
Payer: COMMERCIAL

## 2023-01-03 ENCOUNTER — TELEPHONE (OUTPATIENT)
Dept: OBSTETRICS AND GYNECOLOGY | Facility: CLINIC | Age: 23
End: 2023-01-03
Payer: COMMERCIAL

## 2023-01-03 VITALS
BODY MASS INDEX: 39.65 KG/M2 | SYSTOLIC BLOOD PRESSURE: 106 MMHG | WEIGHT: 238 LBS | HEART RATE: 77 BPM | DIASTOLIC BLOOD PRESSURE: 74 MMHG | HEIGHT: 65 IN

## 2023-01-03 DIAGNOSIS — O36.80X0 PREGNANCY WITH INCONCLUSIVE FETAL VIABILITY, SINGLE OR UNSPECIFIED FETUS: Primary | ICD-10-CM

## 2023-01-03 PROCEDURE — 1159F MED LIST DOCD IN RCRD: CPT | Performed by: OBSTETRICS & GYNECOLOGY

## 2023-01-03 PROCEDURE — 99214 OFFICE O/P EST MOD 30 MIN: CPT | Performed by: OBSTETRICS & GYNECOLOGY

## 2023-01-03 PROCEDURE — 1160F RVW MEDS BY RX/DR IN RCRD: CPT | Performed by: OBSTETRICS & GYNECOLOGY

## 2023-01-03 RX ORDER — PRENATAL WITH FERROUS FUM AND FOLIC ACID 3080; 920; 120; 400; 22; 1.84; 3; 20; 10; 1; 12; 200; 27; 25; 2 [IU]/1; [IU]/1; MG/1; [IU]/1; MG/1; MG/1; MG/1; MG/1; MG/1; MG/1; UG/1; MG/1; MG/1; MG/1; MG/1
1 TABLET ORAL DAILY
Qty: 90 TABLET | Refills: 3 | Status: SHIPPED | OUTPATIENT
Start: 2023-01-03 | End: 2023-02-02

## 2023-01-03 NOTE — TELEPHONE ENCOUNTER
Pt is calling and states she was seen at ED yesterday where she was given an ultrasound as well. She is experiencing abdominal cramping and states they believe she may be having miscarriage. Pt is wondering if provider would like to see her sooner due to recent hospital visit. She is scheduled for a new ob appt with you 1/18, would you prefer to see pt sooner?    Please advise,  Thank you

## 2023-01-03 NOTE — PROGRESS NOTES
Chief Complaint  Vaginal Bleeding    Subjective        Terri Gunter presents to [facility]  History of Present Illness  Patient is a 22-year-old -0-0-2 at 4 weeks 6 days by LMP that was seen in the emergency department yesterday for shortness of breath.  She had an hCG done that was 1700 and ultrasound performed that showed a small area of loculated fluid, but no definitive intrauterine pregnancy was seen.  She denies having any bleeding and reports only some mild cramping.  She is Rh+.  Objective   Vital Signs:  /74   Pulse 77   Ht 165.1 cm (65\")   Wt 108 kg (238 lb)   BMI 39.61 kg/m²   Estimated body mass index is 39.61 kg/m² as calculated from the following:    Height as of this encounter: 165.1 cm (65\").    Weight as of this encounter: 108 kg (238 lb).          Physical Exam  Vitals reviewed.   Constitutional:       Appearance: Normal appearance.   Abdominal:      General: Abdomen is flat. There is no distension.      Palpations: Abdomen is soft.      Tenderness: There is no abdominal tenderness.   Neurological:      General: No focal deficit present.      Mental Status: She is alert. Mental status is at baseline.   Psychiatric:         Mood and Affect: Mood normal.         Behavior: Behavior normal.        Result Review :                Assessment and Plan   Diagnoses and all orders for this visit:    1. Pregnancy with inconclusive fetal viability, single or unspecified fetus (Primary)  -     Prenatal Vit-Fe Fumarate-FA (Prenatal 27) 27-1 MG tablet tablet; Take 1 tablet by mouth Daily for 30 days.  Dispense: 90 tablet; Refill: 3  -     US Ob Transvaginal; Future    Ultrasound today shows a probable gestational sac with no yolk sac measuring 5 weeks.  I did discuss with the patient and her significant other that this could be a very early normal intrauterine pregnancy versus early miscarriage.  She was counseled on bleeding precautions and discussed recommendations  to follow-up in 2 weeks with repeat ultrasound.  A left complex ovarian cyst is noted, but there is no findings suspicious for ectopic pregnancy.         Follow Up   No follow-ups on file.  Patient was given instructions and counseling regarding her condition or for health maintenance advice. Please see specific information pulled into the AVS if appropriate.

## 2023-01-03 NOTE — TELEPHONE ENCOUNTER
Is there any availability for her to have an ultrasound today and then to see me afterwards at [facility]?

## 2023-01-17 ENCOUNTER — TELEPHONE (OUTPATIENT)
Dept: OBSTETRICS AND GYNECOLOGY | Facility: CLINIC | Age: 23
End: 2023-01-17
Payer: COMMERCIAL

## 2023-01-17 RX ORDER — PROMETHAZINE HYDROCHLORIDE 25 MG/1
25 TABLET ORAL EVERY 6 HOURS PRN
Qty: 30 TABLET | Refills: 0 | Status: SHIPPED | OUTPATIENT
Start: 2023-01-17 | End: 2023-02-15

## 2023-01-17 NOTE — TELEPHONE ENCOUNTER
Patient is requesting something to be sent in for nausea. [facility] at [facility]ACY #[id number] - [place], [place] - [address] [phone] [facility] [phone] FX.      Please advise,  Thank you

## 2023-01-18 ENCOUNTER — INITIAL PRENATAL (OUTPATIENT)
Dept: OBSTETRICS AND GYNECOLOGY | Facility: CLINIC | Age: 23
End: 2023-01-18
Payer: COMMERCIAL

## 2023-01-18 VITALS — DIASTOLIC BLOOD PRESSURE: 76 MMHG | SYSTOLIC BLOOD PRESSURE: 111 MMHG | WEIGHT: 240 LBS | BODY MASS INDEX: 39.94 KG/M2

## 2023-01-18 DIAGNOSIS — Z34.90 EARLY STAGE OF PREGNANCY: Primary | ICD-10-CM

## 2023-01-18 LAB
GLUCOSE UR STRIP-MCNC: NEGATIVE MG/DL
PROT UR STRIP-MCNC: NEGATIVE MG/DL

## 2023-01-18 PROCEDURE — 99214 OFFICE O/P EST MOD 30 MIN: CPT | Performed by: OBSTETRICS & GYNECOLOGY

## 2023-01-18 NOTE — PROGRESS NOTES
Chief Complaint   Patient presents with   • Initial Prenatal Visit     HPI- Pt is 22 y.o.  at 6w5d here for prenatal visit.  She presents for initial OB visit.  She was seen approximately 2 weeks ago after having some light vaginal bleeding.  She returns today for ultrasound that confirms an intrauterine pregnancy measuring 6 weeks 5 days with fetal heartbeat.  Her only complaint today is nausea.  She has had no further vaginal bleeding.  A prescription for promethazine was sent to her pharmacy yesterday.  She has a history of 2 term vaginal deliveries and had no complications with those pregnancies.  She is taking prenatal vitamins.    ROS-     - No vaginal bleeding    GI- No abdominal pain    /76   Wt 109 kg (240 lb)   LMP 2022   BMI 39.94 kg/m²   Exam - See flow sheet    Fetal heart rate is normal    Assessment-  Diagnoses and all orders for this visit:    Early stage of pregnancy  -     OB Panel With HIV  -     Urine Culture - Urine, Urine, Clean Catch  -     Drug Profile Urine - 9 Drugs - Urine, Clean Catch  -     IGP,CtNgTv,rfx Aptima HPV ASCU    Other orders  -     POC Urinalysis Dipstick    Initial OB counseling was done.  Blood work was ordered and discussed cell free DNA testing at her next visit.  She will follow-up with me in 4 weeks.

## 2023-01-19 LAB
ABO GROUP BLD: NORMAL
AMPHETAMINES UR QL SCN: NEGATIVE NG/ML
BARBITURATES UR QL SCN: NEGATIVE NG/ML
BASOPHILS # BLD AUTO: 0 X10E3/UL (ref 0–0.2)
BASOPHILS NFR BLD AUTO: 0 %
BENZODIAZ UR QL: NEGATIVE NG/ML
BLD GP AB SCN SERPL QL: NEGATIVE
BZE UR QL: NEGATIVE NG/ML
CANNABINOIDS UR QL SCN: NEGATIVE NG/ML
EOSINOPHIL # BLD AUTO: 0.1 X10E3/UL (ref 0–0.4)
EOSINOPHIL NFR BLD AUTO: 1 %
ERYTHROCYTE [DISTWIDTH] IN BLOOD BY AUTOMATED COUNT: 13.1 % (ref 11.7–15.4)
HBV SURFACE AG SERPL QL IA: NEGATIVE
HCT VFR BLD AUTO: 36.3 % (ref 34–46.6)
HCV AB S/CO SERPL IA: <0.1 S/CO RATIO (ref 0–0.9)
HGB BLD-MCNC: 11.8 G/DL (ref 11.1–15.9)
HIV 1+2 AB+HIV1 P24 AG SERPL QL IA: NON REACTIVE
IMM GRANULOCYTES # BLD AUTO: 0 X10E3/UL (ref 0–0.1)
IMM GRANULOCYTES NFR BLD AUTO: 0 %
LYMPHOCYTES # BLD AUTO: 2.1 X10E3/UL (ref 0.7–3.1)
LYMPHOCYTES NFR BLD AUTO: 29 %
MCH RBC QN AUTO: 30.2 PG (ref 26.6–33)
MCHC RBC AUTO-ENTMCNC: 32.5 G/DL (ref 31.5–35.7)
MCV RBC AUTO: 93 FL (ref 79–97)
METHADONE UR QL SCN: NEGATIVE NG/ML
MONOCYTES # BLD AUTO: 0.9 X10E3/UL (ref 0.1–0.9)
MONOCYTES NFR BLD AUTO: 12 %
NEUTROPHILS # BLD AUTO: 4.3 X10E3/UL (ref 1.4–7)
NEUTROPHILS NFR BLD AUTO: 58 %
OPIATES UR QL: NEGATIVE NG/ML
PCP UR QL: NEGATIVE NG/ML
PLATELET # BLD AUTO: 215 X10E3/UL (ref 150–450)
PROPOXYPH UR QL SCN: NEGATIVE NG/ML
RBC # BLD AUTO: 3.91 X10E6/UL (ref 3.77–5.28)
RH BLD: POSITIVE
RPR SER QL: NON REACTIVE
RUBV IGG SERPL IA-ACNC: 1.77 INDEX
WBC # BLD AUTO: 7.4 X10E3/UL (ref 3.4–10.8)

## 2023-01-20 LAB
BACTERIA UR CULT: NORMAL
BACTERIA UR CULT: NORMAL

## 2023-01-23 LAB
C TRACH RRNA CVX QL NAA+PROBE: NEGATIVE
CONV .: NORMAL
CYTOLOGIST CVX/VAG CYTO: NORMAL
CYTOLOGY CVX/VAG DOC CYTO: NORMAL
CYTOLOGY CVX/VAG DOC THIN PREP: NORMAL
DX ICD CODE: NORMAL
HIV 1 & 2 AB SER-IMP: NORMAL
Lab: NORMAL
N GONORRHOEA RRNA CVX QL NAA+PROBE: NEGATIVE
OTHER STN SPEC: NORMAL
STAT OF ADQ CVX/VAG CYTO-IMP: NORMAL
T VAGINALIS RRNA SPEC QL NAA+PROBE: NEGATIVE

## 2023-02-01 RX ORDER — ONDANSETRON 4 MG/1
4 TABLET, ORALLY DISINTEGRATING ORAL EVERY 8 HOURS PRN
Qty: 30 TABLET | Refills: 1 | Status: SHIPPED | OUTPATIENT
Start: 2023-02-01

## 2023-02-15 ENCOUNTER — ROUTINE PRENATAL (OUTPATIENT)
Dept: OBSTETRICS AND GYNECOLOGY | Facility: CLINIC | Age: 23
End: 2023-02-15
Payer: COMMERCIAL

## 2023-02-15 VITALS — SYSTOLIC BLOOD PRESSURE: 127 MMHG | DIASTOLIC BLOOD PRESSURE: 82 MMHG | BODY MASS INDEX: 40.1 KG/M2 | WEIGHT: 241 LBS

## 2023-02-15 DIAGNOSIS — Z36.0 ENCOUNTER FOR ANTENATAL SCREENING FOR CHROMOSOMAL ANOMALIES: ICD-10-CM

## 2023-02-15 DIAGNOSIS — Z34.81 ENCOUNTER FOR SUPERVISION OF OTHER NORMAL PREGNANCY IN FIRST TRIMESTER: Primary | ICD-10-CM

## 2023-02-15 LAB
GLUCOSE UR STRIP-MCNC: NEGATIVE MG/DL
PROT UR STRIP-MCNC: ABNORMAL MG/DL

## 2023-02-15 PROCEDURE — 99213 OFFICE O/P EST LOW 20 MIN: CPT | Performed by: OBSTETRICS & GYNECOLOGY

## 2023-02-15 RX ORDER — PRENATAL VIT/IRON FUM/FOLIC AC 27MG-0.8MG
TABLET ORAL DAILY
COMMUNITY

## 2023-02-15 NOTE — PROGRESS NOTES
Chief Complaint   Patient presents with   • Routine Prenatal Visit     HPI- Pt is 22 y.o.  at 10w5d here for prenatal visit.  She is overall doing well.  She does report that the prenatal vitamin can cause some nausea.  She denies any vomiting.    ROS-     - No vaginal bleeding    GI- No abdominal pain    /82   Wt 109 kg (241 lb)   LMP 2022   BMI 40.10 kg/m²   Exam - See flow sheet    Fetal heart rate is normal    Assessment-  Diagnoses and all orders for this visit:    Encounter for supervision of other normal pregnancy in first trimester    Encounter for  screening for chromosomal anomalies  -     VayhtgzR12 PLUS Core (chr21,18,13,sex) - Blood,    Other orders  -     Prenatal Vit-Fe Fumarate-FA (prenatal vitamin 27-0.8) 27-0.8 MG tablet tablet; Take  by mouth Daily.  -     POC Urinalysis Dipstick    She is using Zofran as needed for her nausea.  She does desire cell free DNA testing and order was placed.  Viability was confirmed with the V-scan ultrasound.

## 2023-02-20 LAB
CFDNA.FET/CFDNA.TOTAL SFR FETUS: NORMAL %
CITATION REF LAB TEST: NORMAL
FET 13+18+21+X+Y ANEUP PLAS.CFDNA: NEGATIVE
FET CHR 21 TS PLAS.CFDNA QL: NEGATIVE
FET SEX PLAS.CFDNA DOSAGE CFDNA: NORMAL
FET TS 13 RISK PLAS.CFDNA QL: NEGATIVE
FET TS 18 RISK WBC.DNA+CFDNA QL: NEGATIVE
GA EST FROM CONCEPTION DATE: NORMAL D
GESTATIONAL AGE > 9:: YES
LAB DIRECTOR NAME PROVIDER: NORMAL
LAB DIRECTOR NAME PROVIDER: NORMAL
LABORATORY COMMENT REPORT: NORMAL
LIMITATIONS OF THE TEST: NORMAL
NEGATIVE PREDICTIVE VALUE: NORMAL
NOTE: NORMAL
PERFORMANCE CHARACTERISTICS: NORMAL
POSITIVE PREDICTIVE VALUE: NORMAL
REF LAB TEST METHOD: NORMAL
TEST PERFORMANCE INFO SPEC: NORMAL

## 2023-02-20 NOTE — PROGRESS NOTES
Please tell patient that her Materna 21 was low risk for Down's.  Its a boy baby if she wants to know.  Thank you.  MICHELL

## 2023-02-21 ENCOUNTER — TELEPHONE (OUTPATIENT)
Dept: OBSTETRICS AND GYNECOLOGY | Facility: CLINIC | Age: 23
End: 2023-02-21
Payer: COMMERCIAL

## 2023-02-21 NOTE — TELEPHONE ENCOUNTER
----- Message from Jovany Enriquez MD sent at 2/20/2023  5:20 PM EST -----  Please tell patient that her Materna 21 was low risk for Down's.  Its a boy baby if she wants to know.  Thank you.  MICHELL

## 2023-02-22 ENCOUNTER — HOSPITAL ENCOUNTER (EMERGENCY)
Facility: HOSPITAL | Age: 23
Discharge: HOME | End: 2023-02-22
Attending: EMERGENCY MEDICINE | Admitting: EMERGENCY MEDICINE
Payer: COMMERCIAL

## 2023-02-22 VITALS
HEIGHT: 65 IN | OXYGEN SATURATION: 96 % | RESPIRATION RATE: 18 BRPM | TEMPERATURE: 100.6 F | WEIGHT: 242 LBS | HEART RATE: 102 BPM | SYSTOLIC BLOOD PRESSURE: 133 MMHG | DIASTOLIC BLOOD PRESSURE: 76 MMHG | BODY MASS INDEX: 40.32 KG/M2

## 2023-02-22 DIAGNOSIS — R50.9 FEVER, UNSPECIFIED FEVER CAUSE: ICD-10-CM

## 2023-02-22 DIAGNOSIS — Z3A.11 11 WEEKS GESTATION OF PREGNANCY: ICD-10-CM

## 2023-02-22 DIAGNOSIS — U07.1 COVID-19: Primary | ICD-10-CM

## 2023-02-22 LAB
BACTERIA UR QL AUTO: ABNORMAL /HPF
BILIRUB UR QL STRIP: NEGATIVE
CLARITY UR: CLEAR
COLOR UR: YELLOW
FLUAV SUBTYP SPEC NAA+PROBE: NOT DETECTED
FLUBV RNA ISLT QL NAA+PROBE: NOT DETECTED
GLUCOSE UR STRIP-MCNC: NEGATIVE MG/DL
HGB UR QL STRIP.AUTO: NEGATIVE
HYALINE CASTS UR QL AUTO: ABNORMAL /LPF
KETONES UR QL STRIP: ABNORMAL
LEUKOCYTE ESTERASE UR QL STRIP.AUTO: ABNORMAL
NITRITE UR QL STRIP: NEGATIVE
PH UR STRIP.AUTO: 7.5 [PH] (ref 5–8)
PROT UR QL STRIP: ABNORMAL
RBC # UR STRIP: ABNORMAL /HPF
REF LAB TEST METHOD: ABNORMAL
SARS-COV-2 RNA RESP QL NAA+PROBE: DETECTED
SP GR UR STRIP: 1.02 (ref 1–1.03)
SQUAMOUS #/AREA URNS HPF: ABNORMAL /HPF
UROBILINOGEN UR QL STRIP: ABNORMAL
WBC # UR STRIP: ABNORMAL /HPF

## 2023-02-22 PROCEDURE — C9803 HOPD COVID-19 SPEC COLLECT: HCPCS

## 2023-02-22 PROCEDURE — 81001 URINALYSIS AUTO W/SCOPE: CPT | Performed by: NURSE PRACTITIONER

## 2023-02-22 PROCEDURE — 99283 EMERGENCY DEPT VISIT LOW MDM: CPT

## 2023-02-22 PROCEDURE — 87636 SARSCOV2 & INF A&B AMP PRB: CPT | Performed by: EMERGENCY MEDICINE

## 2023-02-22 RX ORDER — ACETAMINOPHEN 500 MG
500 TABLET ORAL EVERY 6 HOURS PRN
Qty: 12 TABLET | Refills: 0 | Status: SHIPPED | OUTPATIENT
Start: 2023-02-22 | End: 2023-03-15

## 2023-02-22 RX ORDER — ACETAMINOPHEN 500 MG
1000 TABLET ORAL ONCE
Status: DISCONTINUED | OUTPATIENT
Start: 2023-02-22 | End: 2023-02-22

## 2023-02-22 RX ORDER — ACETAMINOPHEN 500 MG
500 TABLET ORAL EVERY 4 HOURS PRN
Qty: 12 TABLET | Refills: 0 | Status: SHIPPED | OUTPATIENT
Start: 2023-02-22

## 2023-02-22 RX ORDER — ACETAMINOPHEN 500 MG
1000 TABLET ORAL ONCE
Status: COMPLETED | OUTPATIENT
Start: 2023-02-22 | End: 2023-02-22

## 2023-02-22 RX ADMIN — ACETAMINOPHEN 1000 MG: 500 TABLET, FILM COATED ORAL at 17:45

## 2023-02-22 NOTE — ED PROVIDER NOTES
EMERGENCY DEPARTMENT ENCOUNTER    Room Number:  T02/02  Date seen:  2/22/2023  PCP: Provider, No Known  Historian: self      HPI:  Chief Complaint: cough  A complete HPI/ROS/PMH/PSH/SH/FH are unobtainable due to: n/a  Context: Terri Gunter is a 22 y.o. female who presents to the ED c/o nonproductive cough, fatigue, generalized body aches, increased urination, subjective fever for the last 2 days.  No sore throat, chest pain, shortness of breath, abdominal pain, nausea, vomiting, diarrhea, or leg swelling. No abnormal vaginal bleeding or discharge.  Patient is currently 11 weeks pregnant with a confirmed IUP.        PAST MEDICAL HISTORY  Active Ambulatory Problems     Diagnosis Date Noted   • No Active Ambulatory Problems     Resolved Ambulatory Problems     Diagnosis Date Noted   • Encounter for supervision of normal first pregnancy in third trimester 01/24/2017   • Screen for STD (sexually transmitted disease) 01/24/2017   • Screening for HIV (human immunodeficiency virus) 01/24/2017   • Screening for sickle-cell disease or trait 01/24/2017   • Cystic fibrosis screening 01/24/2017   • Encounter for drug screening 01/24/2017   • Chlamydia infection affecting pregnancy in first trimester, antepartum 01/28/2017   • Screening for diabetes mellitus 05/16/2017   • Decreased fetus movements affecting management of mother in third trimester 06/27/2017   • Asthma affecting pregnancy, antepartum 06/27/2017   • Need for Tdap vaccination 07/11/2017   • Group B Streptococcus carrier, +RV culture, currently pregnant 08/30/2017   • Pregnancy 09/04/2017   • Pregnancy 09/15/2017   • Full-term premature rupture of membranes with onset of labor within 24 hours of rupture 09/16/2017   • Vaginal delivery 09/17/2017   • Chlamydia infection 12/12/2017   • Chlamydia contact, treated 12/20/2017   • Birth control counseling 12/20/2017   • Encounter for initial prescription of contraceptive pills 12/20/2017   • Multigravida in first  trimester 2018   • Screen for STD (sexually transmitted disease) 2018   • Screening for HIV (human immunodeficiency virus) 2018   • Encounter for drug screening 2018   • Nausea and vomiting in pregnancy 2018   • Asthma affecting pregnancy in first trimester 2018   • Encounter for  screening for chromosomal anomalies 2019   • Multigravida in second trimester 2019   • Screening for diabetes mellitus 2019   • Excessive weight gain during pregnancy in third trimester 05/15/2019   • Maternal care for breech presentation, single gestation 2019   • Breech presentation 2019   • Pregnancy 2019   • Pregnancy 2019   • Maternal obesity affecting pregnancy, antepartum 08/10/2019   •  (normal spontaneous vaginal delivery) 2019     Past Medical History:   Diagnosis Date   • Asthma    • Chlamydia    • Gonorrhea          PAST SURGICAL HISTORY  Past Surgical History:   Procedure Laterality Date   • WISDOM TOOTH EXTRACTION           FAMILY HISTORY  Family History   Problem Relation Age of Onset   • Diabetes Father    • Breast cancer Neg Hx    • Ovarian cancer Neg Hx    • Uterine cancer Neg Hx    • Colon cancer Neg Hx          SOCIAL HISTORY  Social History     Socioeconomic History   • Marital status: Single   Tobacco Use   • Smoking status: Former   • Smokeless tobacco: Never   Vaping Use   • Vaping Use: Never used   Substance and Sexual Activity   • Alcohol use: No   • Drug use: No   • Sexual activity: Yes     Partners: Male     Birth control/protection: None         ALLERGIES  Patient has no known allergies.        REVIEW OF SYSTEMS  Per HPI, otherwise negative.       PHYSICAL EXAM  ED Triage Vitals   Temp Heart Rate Resp BP SpO2   23 1737 23 1737 23 1737 23 1738 23 1737   (!) 101.3 °F (38.5 °C) 117 18 148/79 99 %      Temp src Heart Rate Source Patient Position BP Location FiO2 (%)   -- -- -- -- --               Physical Exam  Vitals and nursing note reviewed.   Constitutional:       Appearance: Normal appearance. She is obese.   HENT:      Head: Normocephalic and atraumatic.      Right Ear: Tympanic membrane and ear canal normal.      Left Ear: Tympanic membrane and ear canal normal.      Nose: Congestion present.      Mouth/Throat:      Mouth: Mucous membranes are moist.      Pharynx: No posterior oropharyngeal erythema.   Eyes:      Extraocular Movements: Extraocular movements intact.      Conjunctiva/sclera: Conjunctivae normal.   Cardiovascular:      Rate and Rhythm: Normal rate and regular rhythm.      Pulses: Normal pulses.      Heart sounds: Normal heart sounds.   Pulmonary:      Effort: Pulmonary effort is normal.      Breath sounds: Normal breath sounds.   Abdominal:      General: Bowel sounds are normal.      Palpations: Abdomen is soft.      Tenderness: There is no abdominal tenderness. There is no right CVA tenderness, left CVA tenderness or guarding.   Musculoskeletal:      Cervical back: Normal range of motion.   Skin:     General: Skin is warm.      Capillary Refill: Capillary refill takes less than 2 seconds.      Findings: No rash.   Neurological:      Mental Status: She is alert and oriented to person, place, and time. Mental status is at baseline.   Psychiatric:         Mood and Affect: Mood normal.               LAB RESULTS  Recent Results (from the past 24 hour(s))   COVID-19 and FLU A/B PCR - Swab, Nasopharynx    Collection Time: 02/22/23  5:40 PM    Specimen: Nasopharynx; Swab   Result Value Ref Range    COVID19 Detected (C) Not Detected - Ref. Range    Influenza A PCR Not Detected Not Detected    Influenza B PCR Not Detected Not Detected   Urinalysis With Culture If Indicated - Urine, Clean Catch    Collection Time: 02/22/23  6:40 PM    Specimen: Urine, Clean Catch   Result Value Ref Range    Color, UA Yellow Yellow, Straw    Appearance, UA Clear Clear    pH, UA 7.5 5.0 - 8.0    Specific  Gravity, UA 1.022 1.005 - 1.030    Glucose, UA Negative Negative    Ketones, UA Trace (A) Negative    Bilirubin, UA Negative Negative    Blood, UA Negative Negative    Protein, UA Trace (A) Negative    Leuk Esterase, UA Trace (A) Negative    Nitrite, UA Negative Negative    Urobilinogen, UA 1.0 E.U./dL 0.2 - 1.0 E.U./dL   Urinalysis, Microscopic Only - Urine, Clean Catch    Collection Time: 02/22/23  6:40 PM    Specimen: Urine, Clean Catch   Result Value Ref Range    RBC, UA 0-2 None Seen, 0-2 /HPF    WBC, UA 0-2 None Seen, 0-2 /HPF    Bacteria, UA None Seen None Seen /HPF    Squamous Epithelial Cells, UA 3-6 (A) None Seen, 0-2 /HPF    Hyaline Casts, UA 0-2 None Seen /LPF    Methodology Automated Microscopy        Ordered the above labs and reviewed the results.        RADIOLOGY  No Radiology Exams Resulted Within Past 24 Hours    Ordered the above noted radiological studies. Reviewed by me in PACS.              MEDICATIONS GIVEN IN ER  Medications   acetaminophen (TYLENOL) tablet 1,000 mg (1,000 mg Oral Given 2/22/23 1745)             MEDICAL DECISION MAKING, PROGRESS, and CONSULTS    All labs have been independently reviewed by me.  All radiology studies have been reviewed by me and I have also reviewed the radiology report.   EKG's independently viewed and interpreted by me.  Discussion below represents my analysis of pertinent findings related to patient's condition, differential diagnosis, treatment plan and final disposition.    History of cough, fatigue, body aches, urinary frequency  Labs remarkable for positive COVID-19  UA negative  Intermatic treatment and follow-up as needed    Additional sources:  - Discussed/ obtained information from independent historians:  n/a    - External (non-ED) record review:   2/15/2023: Routine prenatal visit at 10 weeks normal genetic testing    - Chronic or social conditions impacting care: pregnancy    - Shared decision making:  n/a      Orders placed during this  visit:  Orders Placed This Encounter   Procedures   • COVID-19 and FLU A/B PCR - Swab, Nasopharynx   • Urinalysis With Culture If Indicated - Urine, Clean Catch   • Urinalysis, Microscopic Only - Urine, Clean Catch         Additional orders considered but not ordered:  labs        Differential diagnosis include, but not limited to:  COVID 19, flu, RSV, UTI, sinusitis        Independent interpretation of labs, radiology studies, and discussions with consultants:  ED Course as of 02/22/23 1912 Wed Feb 22, 2023   1854 COVID19(!!): Detected [JG]   1903 Leukocytes, UA(!): Trace [JG]   1903 Protein, UA(!): Trace [JG]   1903 Squamous Epithelial Cells, UA(!): 3-6 [JG]   1903 Bacteria, UA: None Seen [JG]   1910 Patient requesting RX for tylenol.  [JG]      ED Course User Index  [JG] Latha Crane APRN             DIAGNOSIS  Final diagnoses:   COVID-19   Fever, unspecified fever cause   11 weeks gestation of pregnancy         DISPOSITION  discharge            Latest Documented Vital Signs:  As of 19:12 EST  BP- 149/88 HR- 87 Temp- (!) 100.6 °F (38.1 °C) (Oral) O2 sat- 99%              --    Please note that portions of this were completed with a voice recognition program.       Note Disclaimer: At [facility], we believe that sharing information builds trust and better relationships. You are receiving this note because you are receiving care at [facility] or recently visited. It is possible you will see health information before a provider has talked with you about it. This kind of information can be easy to misunderstand. To help you fully understand what it means for your health, we urge you to discuss this note with your provider.           Latha Crane APRN  02/22/23 1910       Latha Crane APRN  02/22/23 1912

## 2023-02-22 NOTE — ED TRIAGE NOTES
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

## 2023-02-23 NOTE — DISCHARGE INSTRUCTIONS
Rest, drink plenty of fluids  Tylenol as needed for fever and body aches  Return if you are unable to keep fluids down for greater than 6 hours, any new concerns

## 2023-03-15 ENCOUNTER — ROUTINE PRENATAL (OUTPATIENT)
Dept: OBSTETRICS AND GYNECOLOGY | Facility: CLINIC | Age: 23
End: 2023-03-15
Payer: COMMERCIAL

## 2023-03-15 VITALS — DIASTOLIC BLOOD PRESSURE: 79 MMHG | BODY MASS INDEX: 40.77 KG/M2 | WEIGHT: 245 LBS | SYSTOLIC BLOOD PRESSURE: 125 MMHG

## 2023-03-15 DIAGNOSIS — Z36.86 ENCOUNTER FOR ANTENATAL SCREENING FOR CERVICAL LENGTH: ICD-10-CM

## 2023-03-15 DIAGNOSIS — Z34.82 ENCOUNTER FOR SUPERVISION OF OTHER NORMAL PREGNANCY IN SECOND TRIMESTER: Primary | ICD-10-CM

## 2023-03-15 DIAGNOSIS — Z36.89 ENCOUNTER FOR FETAL ANATOMIC SURVEY: ICD-10-CM

## 2023-03-15 LAB
GLUCOSE UR STRIP-MCNC: NEGATIVE MG/DL
PROT UR STRIP-MCNC: NEGATIVE MG/DL

## 2023-03-15 PROCEDURE — 99213 OFFICE O/P EST LOW 20 MIN: CPT | Performed by: OBSTETRICS & GYNECOLOGY

## 2023-03-15 NOTE — PROGRESS NOTES
Chief Complaint   Patient presents with   • Routine Prenatal Visit     HPI- Pt is 22 y.o.  at 14w5d here for prenatal visit.  She is doing well and has no major complaints.    ROS-     - No vaginal bleeding    GI- No abdominal pain    /79   Wt 111 kg (245 lb)   LMP 2022   BMI 40.77 kg/m²   Exam - See flow sheet    Fetal heart rate is normal    Assessment-  Diagnoses and all orders for this visit:    Encounter for supervision of other normal pregnancy in second trimester    Encounter for fetal anatomic survey  -     US Ob 14 + Weeks Single or First Gestation; Future    Encounter for  screening for cervical length  -     US Ob Transvaginal; Future    Other orders  -     POC Urinalysis Dipstick      Patient had normal cell free DNA testing.  She will follow-up in 5 weeks with anatomy ultrasound.

## 2023-04-21 ENCOUNTER — ROUTINE PRENATAL (OUTPATIENT)
Dept: OBSTETRICS AND GYNECOLOGY | Facility: CLINIC | Age: 23
End: 2023-04-21
Payer: COMMERCIAL

## 2023-04-21 VITALS — BODY MASS INDEX: 40.77 KG/M2 | WEIGHT: 245 LBS | SYSTOLIC BLOOD PRESSURE: 126 MMHG | DIASTOLIC BLOOD PRESSURE: 84 MMHG

## 2023-04-21 DIAGNOSIS — Z34.82 ENCOUNTER FOR SUPERVISION OF OTHER NORMAL PREGNANCY IN SECOND TRIMESTER: Primary | ICD-10-CM

## 2023-04-21 DIAGNOSIS — R10.2 PELVIC PAIN AFFECTING PREGNANCY IN SECOND TRIMESTER, ANTEPARTUM: ICD-10-CM

## 2023-04-21 DIAGNOSIS — O26.892 PELVIC PAIN AFFECTING PREGNANCY IN SECOND TRIMESTER, ANTEPARTUM: ICD-10-CM

## 2023-04-21 LAB
GLUCOSE UR STRIP-MCNC: NEGATIVE MG/DL
PROT UR STRIP-MCNC: NEGATIVE MG/DL

## 2023-04-21 NOTE — PROGRESS NOTES
Chief Complaint   Patient presents with   • Routine Prenatal Visit     HPI- Pt is 22 y.o.  at 20w0d here for prenatal visit.  She is doing well but does complain of lower pelvic pain.  She is feeling fetal movement.    ROS-     - No vaginal bleeding    GI- No abdominal pain    /84   Wt 111 kg (245 lb)   LMP 2022   BMI 40.77 kg/m²   Exam - See flow sheet    Fetal heart rate is normal    Assessment-  Diagnoses and all orders for this visit:    Encounter for supervision of other normal pregnancy in second trimester    Pelvic pain affecting pregnancy in second trimester, antepartum    Other orders  -     POC Urinalysis Dipstick      Discussed belly band and supportive measures at home to help with pelvic pain in pregnancy.  Anatomy ultrasound was performed today and anatomy appears normal.  Ultrasound was reviewed with her.  She will follow-up in 4 weeks.

## 2023-05-19 ENCOUNTER — ROUTINE PRENATAL (OUTPATIENT)
Dept: OBSTETRICS AND GYNECOLOGY | Facility: CLINIC | Age: 23
End: 2023-05-19
Payer: COMMERCIAL

## 2023-05-19 VITALS — BODY MASS INDEX: 41.1 KG/M2 | DIASTOLIC BLOOD PRESSURE: 73 MMHG | SYSTOLIC BLOOD PRESSURE: 108 MMHG | WEIGHT: 247 LBS

## 2023-05-19 DIAGNOSIS — Z13.1 SCREENING FOR DIABETES MELLITUS: ICD-10-CM

## 2023-05-19 DIAGNOSIS — Z34.82 ENCOUNTER FOR SUPERVISION OF OTHER NORMAL PREGNANCY IN SECOND TRIMESTER: Primary | ICD-10-CM

## 2023-05-19 DIAGNOSIS — Z13.0 SCREENING FOR IRON DEFICIENCY ANEMIA: ICD-10-CM

## 2023-05-19 NOTE — PROGRESS NOTES
Chief Complaint   Patient presents with   • Routine Prenatal Visit     HPI- Pt is 22 y.o.  at 24w0d here for prenatal visit.  She is overall doing well.  She does report good fetal movement.  She does report some episodes of dizziness, but states it resolves when she sits down.    ROS-     - No vaginal bleeding    GI- No abdominal pain    /73   Wt 112 kg (247 lb)   LMP 2022   BMI 41.10 kg/m²   Exam - See flow sheet    Fetal heart rate is normal    Assessment-  Diagnoses and all orders for this visit:    Encounter for supervision of other normal pregnancy in second trimester    Screening for diabetes mellitus  -     Gestational Screen 1 Hr ([facility])    Screening for iron deficiency anemia  -     CBC (No Diff)    Discussed maintaining hydration and eating small, frequent meals of the day to help with dizziness.  Discussed 1 hour glucose test with her next visit and instructions were given.

## 2023-06-05 ENCOUNTER — TELEPHONE (OUTPATIENT)
Dept: OBSTETRICS AND GYNECOLOGY | Facility: CLINIC | Age: 23
End: 2023-06-05
Payer: COMMERCIAL

## 2023-06-05 RX ORDER — ONDANSETRON 4 MG/1
4 TABLET, ORALLY DISINTEGRATING ORAL EVERY 8 HOURS PRN
Qty: 24 TABLET | Refills: 1 | Status: SHIPPED | OUTPATIENT
Start: 2023-06-05

## 2023-06-05 NOTE — TELEPHONE ENCOUNTER
Dr Riojas patient/ @^ weeks. Requesting  refill For Zofran  be sent to [facility] in [facility] on [address]. Thank You.

## 2023-06-08 ENCOUNTER — REFERRAL TRIAGE (OUTPATIENT)
Dept: LABOR AND DELIVERY | Facility: HOSPITAL | Age: 23
End: 2023-06-08
Payer: COMMERCIAL

## 2023-06-13 ENCOUNTER — PATIENT OUTREACH (OUTPATIENT)
Dept: LABOR AND DELIVERY | Facility: HOSPITAL | Age: 23
End: 2023-06-13
Payer: COMMERCIAL

## 2023-06-13 NOTE — OUTREACH NOTE
[facility]  Enrollment    Current Estimated Gestational Age: 27w4d    Questions/Answers    Flowsheet Row Responses   Would like to participate? No        Pt feels well prepared for her delivery and denies resource needs at this time so declines program today.     Elizabeth Woods RN  Maternity Nurse Navigator    6/13/2023, 15:41 EDT

## 2023-06-14 ENCOUNTER — ROUTINE PRENATAL (OUTPATIENT)
Dept: OBSTETRICS AND GYNECOLOGY | Facility: CLINIC | Age: 23
End: 2023-06-14
Payer: COMMERCIAL

## 2023-06-14 VITALS — SYSTOLIC BLOOD PRESSURE: 119 MMHG | DIASTOLIC BLOOD PRESSURE: 77 MMHG | WEIGHT: 252 LBS | BODY MASS INDEX: 41.93 KG/M2

## 2023-06-14 DIAGNOSIS — Z13.89 SCREENING FOR BLOOD OR PROTEIN IN URINE: ICD-10-CM

## 2023-06-14 DIAGNOSIS — O26.892 HEARTBURN DURING PREGNANCY IN SECOND TRIMESTER: ICD-10-CM

## 2023-06-14 DIAGNOSIS — R12 HEARTBURN DURING PREGNANCY IN SECOND TRIMESTER: ICD-10-CM

## 2023-06-14 DIAGNOSIS — R11.0 PREGNANCY RELATED NAUSEA, ANTEPARTUM: ICD-10-CM

## 2023-06-14 DIAGNOSIS — O36.8120 DECREASED FETAL MOVEMENTS IN SECOND TRIMESTER, SINGLE OR UNSPECIFIED FETUS: Primary | ICD-10-CM

## 2023-06-14 DIAGNOSIS — O26.899 PREGNANCY RELATED NAUSEA, ANTEPARTUM: ICD-10-CM

## 2023-06-14 LAB
ERYTHROCYTE [DISTWIDTH] IN BLOOD BY AUTOMATED COUNT: 13 % (ref 12.3–15.4)
GLUCOSE 1H P 50 G GLC PO SERPL-MCNC: 108 MG/DL (ref 65–139)
GLUCOSE UR STRIP-MCNC: NEGATIVE MG/DL
HCT VFR BLD AUTO: 33.5 % (ref 34–46.6)
HGB BLD-MCNC: 11.2 G/DL (ref 12–15.9)
MCH RBC QN AUTO: 30.4 PG (ref 26.6–33)
MCHC RBC AUTO-ENTMCNC: 33.4 G/DL (ref 31.5–35.7)
MCV RBC AUTO: 90.8 FL (ref 79–97)
PLATELET # BLD AUTO: 199 10*3/MM3 (ref 140–450)
PROT UR STRIP-MCNC: NEGATIVE MG/DL
RBC # BLD AUTO: 3.69 10*6/MM3 (ref 3.77–5.28)
WBC # BLD AUTO: 9.23 10*3/MM3 (ref 3.4–10.8)

## 2023-06-14 RX ORDER — FAMOTIDINE 20 MG/1
20 TABLET, FILM COATED ORAL DAILY
Qty: 30 TABLET | Refills: 1 | Status: SHIPPED | OUTPATIENT
Start: 2023-06-14 | End: 2024-06-13

## 2023-06-14 RX ORDER — ONDANSETRON 4 MG/1
4 TABLET, ORALLY DISINTEGRATING ORAL EVERY 8 HOURS PRN
Qty: 24 TABLET | Refills: 1 | Status: SHIPPED | OUTPATIENT
Start: 2023-06-14

## 2023-06-14 NOTE — PROGRESS NOTES
Chief Complaint   Patient presents with    Routine Prenatal Visit     HPI- Pt is 22 y.o.  at 27w5d here for prenatal visit.  Patient states that she does not feel that this baby moves as much as her other 2.  She does not feel that she could feel 10 movements over 2-hour period.  She does also complain of heartburn with associated nausea.  ROS-     - No vaginal bleeding    GI- No abdominal pain    /77   Wt 114 kg (252 lb)   LMP 2022   BMI 41.93 kg/m²   Exam - See flow sheet    Fetal heart rate is normal    Assessment-  Diagnoses and all orders for this visit:    Decreased fetal movements in second trimester, single or unspecified fetus  -     US Ob Follow Up Transabdominal Approach; Future    Heartburn during pregnancy in second trimester  -     famotidine (Pepcid) 20 MG tablet; Take 1 tablet by mouth Daily.    Pregnancy related nausea, antepartum  -     ondansetron ODT (ZOFRAN-ODT) 4 MG disintegrating tablet; Place 1 tablet on the tongue Every 8 (Eight) Hours As Needed for Nausea or Vomiting.    Screening for blood or protein in urine  -     POC Urinalysis Dipstick    Growth ultrasound was performed in the office and showed appropriate fetal growth and good fetal movement was noted throughout the ultrasound.  I did discuss fetal kick counts and to monitor fetal movement with no distractions.  She was advised to call if she is not meeting kick count criteria.  Prescriptions were sent for Zofran and Pepcid.  She is doing her glucose test today and will follow-up in 2 weeks.

## 2023-07-23 ENCOUNTER — APPOINTMENT (OUTPATIENT)
Dept: GENERAL RADIOLOGY | Facility: HOSPITAL | Age: 23
End: 2023-07-23
Payer: COMMERCIAL

## 2023-07-23 ENCOUNTER — HOSPITAL ENCOUNTER (EMERGENCY)
Facility: HOSPITAL | Age: 23
Discharge: HOME | End: 2023-07-23
Attending: EMERGENCY MEDICINE | Admitting: EMERGENCY MEDICINE
Payer: COMMERCIAL

## 2023-07-23 VITALS
HEIGHT: 65 IN | TEMPERATURE: 98 F | SYSTOLIC BLOOD PRESSURE: 115 MMHG | WEIGHT: 252 LBS | OXYGEN SATURATION: 97 % | HEART RATE: 98 BPM | DIASTOLIC BLOOD PRESSURE: 77 MMHG | RESPIRATION RATE: 16 BRPM | BODY MASS INDEX: 41.99 KG/M2

## 2023-07-23 DIAGNOSIS — M25.531 ACUTE PAIN OF RIGHT WRIST: Primary | ICD-10-CM

## 2023-07-23 PROCEDURE — 73110 X-RAY EXAM OF WRIST: CPT

## 2023-07-23 PROCEDURE — 99283 EMERGENCY DEPT VISIT LOW MDM: CPT

## 2023-07-23 NOTE — DISCHARGE INSTRUCTIONS
Although you are being discharged from the ED today, I encourage you to return for worsening symptoms. Things can, and do, change such that treatment at home with medication may not be adequate. Specifically I recommend returning for chest pain or discomfort, difficulty breathing, persistent vomiting or difficulty holding down liquids or medications, fever > 102.0 F or any other worsening or alarming symptoms.     Rest. Drink plenty of fluids.  Follow up with primary care provider for further management and to have blood pressure rechecked.  Call [phone] to get established with a new primary care provider if you do not already have one.    Below is a list of medications that are safe for you to take during pregnancy:    COLD:  ROBITUSSIN  ACTIFED  TYLENOL EXTRA STRENGTH  TYLENOL COLD OR TYLENOL    PAINS & ACHES:  TYLENOL  TYLENOL EXTRA STRENGTH    DIARRHEA:  IMMODIUM AD  FIBERCON  CLEAR LIQUIDS  JELLO    HEADACHES:  TYLENOL EXTRA STRENGTH    HEARTBURN / INDIGESTION:  MAALOX  MYLANTA  TUMS  ZANTAC    HEMORRHOIDS:  PREPARATION H  ANUSOL    TO SLEEP:  TYLENOL PM  BENADRYL 25MG    YEAST INFECTIONS:  MONISTAT 7  MONISTAT 3 (generic ok)    CONSTIPATION:  METAMUCIL  EAT VERY LITTLE RICE  PRUNES, PRUNE JUICE  8 GLASSES OF WATER DAILY  COLACE (STOOL SOFTENERS)    SEASONAL ALLERGIES:  BENADRYL, ZYRTEC  CLARITIN  WALITIN    If you develop swelling during your pregnancy, then avoid the following: salt, fast food, junk food, sodas and canned soup.

## 2023-07-23 NOTE — ED NOTES
Bilat wrist pain x 2 months.  Nki.  It is worsening.  Yesterday she couldn't  her niece.  Pt is 32 weeks pregnant

## 2023-07-23 NOTE — ED PROVIDER NOTES
MD ATTESTATION NOTE    The NIKKO and I have discussed this patient's history, physical exam, and treatment plan.  I have reviewed the documentation and personally had a face to face interaction with the patient. I affirm the documentation and agree with the treatment and plan.  The attached note describes my personal findings.      I provided a substantive portion of the care of the patient.  I personally performed the physical exam in its entirety, and below are my findings.      Brief HPI: 22-year-old female who is approximately 32 weeks pregnant who presents with several weeks of right wrist greater than left wrist pain.  She denies trauma.  The patient states that the symptoms are worse during the night and better during the day    General : 22-year-old patient is awake alert and oriented  HEENT: NCAT  Abd: Soft and nontender: Gravid  Ext: No acute abnormalities: The patient does have tenderness over her wrist right greater than left but there is no swelling and no decreased range of motion she is neurovascular intact in both hands with good strength, sensation and cap refill less than 2 and 2+ radial pulses  Skin: No rash  Neuro: Awake with a nonfocal neuro exam  Psych: Normal mood and affect      Plan: We will x-ray the patient's wrist    The patient's wrist x-ray is negative    I believe the patient likely has carpal tunnel syndrome.  We will place her in a splint and have her follow-up with her PCP as an outpatient.       Cuba Franks MD  07/23/23 [id number]

## 2023-07-23 NOTE — ED NOTES
Right wrist pain x 2 months which increased last night. Diminished  strength starting last night; denies recent trauma. States that at night the pain started while sleeping, and whole hand/wrist would become numb. No visualized deformity, swelling.

## 2023-07-23 NOTE — ED PROVIDER NOTES
EMERGENCY DEPARTMENT ENCOUNTER    Room Number:  05/05  Date seen:  7/23/2023  PCP: Robert Guthrie MD    Spoken Language:  English  Language interpretation services not needed     HPI:  Chief Complaint: right wrist pain    A complete HPI/ROS/PMH/PSH/SH/FH are unobtainable due to: n/a    Context: Terri Gunter is a 22 y.o. female (~33 weeks pregnant) presenting to the emergency department complaining of right wrist pain.  The history is being obtained by the patient and by review of the medical chart. She states that she has had bilateral wrist pain (righ worse than left) for the past 2 months. It initially started by waking her at nighttime. She denies any injury or accident which onset her pain. It is increased at nighttime and with physical activity.     PAST MEDICAL HISTORY  Active Ambulatory Problems     Diagnosis Date Noted    No Active Ambulatory Problems     Resolved Ambulatory Problems     Diagnosis Date Noted    Encounter for supervision of normal first pregnancy in third trimester 01/24/2017    Screen for STD (sexually transmitted disease) 01/24/2017    Screening for HIV (human immunodeficiency virus) 01/24/2017    Screening for sickle-cell disease or trait 01/24/2017    Cystic fibrosis screening 01/24/2017    Encounter for drug screening 01/24/2017    Chlamydia infection affecting pregnancy in first trimester, antepartum 01/28/2017    Screening for diabetes mellitus 05/16/2017    Decreased fetus movements affecting management of mother in third trimester 06/27/2017    Asthma affecting pregnancy, antepartum 06/27/2017    Need for Tdap vaccination 07/11/2017    Group B Streptococcus carrier, +RV culture, currently pregnant 08/30/2017    Pregnancy 09/04/2017    Pregnancy 09/15/2017    Full-term premature rupture of membranes with onset of labor within 24 hours of rupture 09/16/2017    Vaginal delivery 09/17/2017    Chlamydia infection 12/12/2017    Chlamydia contact, treated 12/20/2017    Birth control  counseling 2017    Encounter for initial prescription of contraceptive pills 2017    Multigravida in first trimester 2018    Screen for STD (sexually transmitted disease) 2018    Screening for HIV (human immunodeficiency virus) 2018    Encounter for drug screening 2018    Nausea and vomiting in pregnancy 2018    Asthma affecting pregnancy in first trimester 2018    Encounter for  screening for chromosomal anomalies 2019    Multigravida in second trimester 2019    Screening for diabetes mellitus 2019    Excessive weight gain during pregnancy in third trimester 05/15/2019    Maternal care for breech presentation, single gestation 2019    Breech presentation 2019    Pregnancy 2019    Pregnancy 2019    Maternal obesity affecting pregnancy, antepartum 08/10/2019     (normal spontaneous vaginal delivery) 2019     Past Medical History:   Diagnosis Date    Asthma     Chlamydia     Gonorrhea        PAST SURGICAL HISTORY  Past Surgical History:   Procedure Laterality Date    WISDOM TOOTH EXTRACTION         FAMILY HISTORY  Family History   Problem Relation Age of Onset    Diabetes Father     Breast cancer Neg Hx     Ovarian cancer Neg Hx     Uterine cancer Neg Hx     Colon cancer Neg Hx        SOCIAL HISTORY  Social History     Socioeconomic History    Marital status: Single   Tobacco Use    Smoking status: Former    Smokeless tobacco: Never   Vaping Use    Vaping Use: Never used   Substance and Sexual Activity    Alcohol use: No    Drug use: No    Sexual activity: Yes     Partners: Male     Birth control/protection: None       ALLERGIES  Patient has no known allergies.    REVIEW OF SYSTEMS  All systems reviewed and negative except for those discussed in HPI.     PHYSICAL EXAM  ED Triage Vitals   Temp Heart Rate Resp BP SpO2   23 1136 23 1136 23 1136 23 1154 23 1136   98 °F (36.7 °C) 112 16  117/82 99 %      Temp src Heart Rate Source Patient Position BP Location FiO2 (%)   07/23/23 1136 07/23/23 1136 -- -- --   Tympanic Monitor          Physical Exam  Constitutional:       Appearance: Normal appearance.   HENT:      Head: Normocephalic and atraumatic.      Mouth/Throat:      Mouth: Mucous membranes are moist.   Eyes:      Extraocular Movements: Extraocular movements intact.      Pupils: Pupils are equal, round, and reactive to light.   Cardiovascular:      Rate and Rhythm: Normal rate and regular rhythm.   Pulmonary:      Effort: Pulmonary effort is normal.      Breath sounds: Normal breath sounds.   Musculoskeletal:      Cervical back: Normal range of motion and neck supple.      Comments: No obvious deformity, discoloration or open wound to the bilateral wrists.  Limited range of motion with flexion extension of the right wrist secondary to pain.  Normal capillary refill.  Normal palpable distal pulses.   Neurological:      Mental Status: She is alert and oriented to person, place, and time. Mental status is at baseline.   Psychiatric:         Mood and Affect: Mood normal.         Behavior: Behavior normal.         Thought Content: Thought content normal.         Judgment: Judgment normal.       LAB RESULTS  No results found for this or any previous visit (from the past 24 hour(s)).    RADIOLOGY  Imaging Results (Last 24 Hours)       Procedure Component Value Units Date/Time    XR Wrist 3+ View Right [[id number]] Collected: 07/23/23 1334     Updated: 07/23/23 1338    Narrative:      THREE-VIEW RIGHT WRIST     HISTORY: Worsening wrist pain. No trauma.     FINDINGS: No bone or joint abnormality is seen.     This report was finalized on 7/23/2023 1:35 PM by Dr. Ishan Brown M.D.               PROCEDURES  Procedures  None    MEDICATIONS GIVEN IN ER  Medications - No data to display    MEDICAL DECISION MAKING, PROGRESS, and CONSULTS  Vital signs and nursing notes have all been reviewed by me.    All  radiology studies have been reviewed and independently interpreted by me and discussed with radiologist dictating the report.  There is no evidence of fracture, dislocation, or osteomyelitis to the right wrist.    Orders placed during this visit:  Orders Placed This Encounter   Procedures    [facility] 06.  Wrist Brace    XR Wrist 3+ View Right       Differential diagnosis includes but is not limited to:  Differential diagnosis includes but is not limited to:  - contusion  - wrist sprain  - bony fracture  - carpal tunnel syndrome    Discussion below represents my analysis of pertinent findings related to patient's condition, differential diagnosis, treatment plan and final disposition:    The patient presents with right wrist pain which has been present for approximately 2 months.  She denies injury or accident.  Her x-rays are negative for acute fracture or dislocation.  We will place her in a wrist splint and have her follow-up with her primary care provider for further evaluation.             Additional sources:  - Chronic or social conditions impacting care: patient is pregnant    - Shared decision making: I discussed ED evaluation and treatment plan with patient who is in agreement.  Discussed at length ED testing.     DIAGNOSIS  Final diagnoses:   Acute pain of right wrist       DISPOSITION  ED Disposition       ED Disposition   Discharge    Condition   Stable    Comment   --               FOLLOW UP  Robert Guthrie MD  [address] [place] [address]  [phone]    Schedule an appointment as soon as possible for a visit         RX  Medications - No data to display       Medication List      No changes were made to your prescriptions during this visit.         Latest Documented Vital Signs:  As of 14:17 EDT  BP- 115/77 HR- 98 Temp- 98 °F (36.7 °C) (Tympanic) O2 sat- 97%    Provider Attestation:  I personally reviewed the past medical history, past surgical history, social history,  family history, current medications and allergies as they appear in the chart. I reviewed the patient's history, physical, lab/imaging results and overall care with Dr. Franks who is in agreement with the patient's treatment plan.    EMR Dragon/Transcription disclaimer:  Dictated using Dragon dictation    Provider note signed by:    Note Disclaimer: At [facility], we believe that sharing information builds trust and better relationships. You are receiving this note because you are receiving care at [facility] or recently visited. It is possible you will see health information before a provider has talked with you about it. This kind of information can be easy to misunderstand. To help you fully understand what it means for your health, we urge you to discuss this note with your provider.         Esthela Sneed PA  07/23/23 3594

## 2023-07-26 ENCOUNTER — ROUTINE PRENATAL (OUTPATIENT)
Dept: OBSTETRICS AND GYNECOLOGY | Facility: CLINIC | Age: 23
End: 2023-07-26
Payer: COMMERCIAL

## 2023-07-26 VITALS — BODY MASS INDEX: 41.64 KG/M2 | SYSTOLIC BLOOD PRESSURE: 103 MMHG | DIASTOLIC BLOOD PRESSURE: 69 MMHG | WEIGHT: 250.2 LBS

## 2023-07-26 DIAGNOSIS — Z13.89 SCREENING FOR BLOOD OR PROTEIN IN URINE: ICD-10-CM

## 2023-07-26 DIAGNOSIS — M25.531 BILATERAL WRIST PAIN: ICD-10-CM

## 2023-07-26 DIAGNOSIS — M25.532 BILATERAL WRIST PAIN: ICD-10-CM

## 2023-07-26 DIAGNOSIS — Z34.83 ENCOUNTER FOR SUPERVISION OF OTHER NORMAL PREGNANCY IN THIRD TRIMESTER: Primary | ICD-10-CM

## 2023-07-26 LAB
GLUCOSE UR STRIP-MCNC: NEGATIVE MG/DL
PROT UR STRIP-MCNC: NEGATIVE MG/DL

## 2023-07-26 NOTE — PROGRESS NOTES
Chief Complaint   Patient presents with    Routine Prenatal Visit     HPI- Pt is 22 y.o.  at 33w5d here for prenatal visit.  Patient continues to report bilateral wrist pain.  She denies any hand or wrist numbness.  She was seen at urgent care and had a normal wrist x-ray.  She does report good fetal movement.    ROS-     - No vaginal bleeding    GI- No abdominal pain    /69   Wt 113 kg (250 lb 3.2 oz)   LMP 2022   BMI 41.64 kg/m²   Exam - See flow sheet    Fetal heart rate is normal    Assessment-  Diagnoses and all orders for this visit:    Encounter for supervision of other normal pregnancy in third trimester    Bilateral wrist pain    Recommend possible orthopedic surgery referral if the wrist pain is still present postpartum.  Discussed wrist splints to use and Tylenol as needed.  Growth ultrasound today shows appropriate fetal growth and ultrasound was reviewed with her.  She will follow-up for next prenatal visit in 2 weeks.

## 2023-08-06 ENCOUNTER — APPOINTMENT (OUTPATIENT)
Dept: CT IMAGING | Facility: HOSPITAL | Age: 23
End: 2023-08-06
Payer: COMMERCIAL

## 2023-08-06 ENCOUNTER — HOSPITAL ENCOUNTER (EMERGENCY)
Facility: HOSPITAL | Age: 23
Discharge: HOME | End: 2023-08-06
Attending: EMERGENCY MEDICINE | Admitting: EMERGENCY MEDICINE
Payer: COMMERCIAL

## 2023-08-06 VITALS
OXYGEN SATURATION: 96 % | DIASTOLIC BLOOD PRESSURE: 64 MMHG | HEIGHT: 65 IN | HEART RATE: 102 BPM | TEMPERATURE: 99.5 F | WEIGHT: 240 LBS | SYSTOLIC BLOOD PRESSURE: 94 MMHG | RESPIRATION RATE: 18 BRPM | BODY MASS INDEX: 39.99 KG/M2

## 2023-08-06 DIAGNOSIS — B34.9 VIRAL SYNDROME: Primary | ICD-10-CM

## 2023-08-06 DIAGNOSIS — O26.899 PREGNANCY RELATED NAUSEA, ANTEPARTUM: ICD-10-CM

## 2023-08-06 DIAGNOSIS — R11.0 PREGNANCY RELATED NAUSEA, ANTEPARTUM: ICD-10-CM

## 2023-08-06 LAB
ALBUMIN SERPL-MCNC: 3.2 G/DL (ref 3.5–5.2)
ALBUMIN/GLOB SERPL: 1 G/DL
ALP SERPL-CCNC: 119 U/L (ref 39–117)
ALT SERPL W P-5'-P-CCNC: 22 U/L (ref 1–33)
ANION GAP SERPL CALCULATED.3IONS-SCNC: 15 MMOL/L (ref 5–15)
AST SERPL-CCNC: 23 U/L (ref 1–32)
B PARAPERT DNA SPEC QL NAA+PROBE: NOT DETECTED
B PERT DNA SPEC QL NAA+PROBE: NOT DETECTED
BASOPHILS # BLD AUTO: 0.02 10*3/MM3 (ref 0–0.2)
BASOPHILS NFR BLD AUTO: 0.2 % (ref 0–1.5)
BILIRUB SERPL-MCNC: 0.4 MG/DL (ref 0–1.2)
BUN SERPL-MCNC: 6 MG/DL (ref 6–20)
BUN/CREAT SERPL: 8.2 (ref 7–25)
C PNEUM DNA NPH QL NAA+NON-PROBE: NOT DETECTED
CALCIUM SPEC-SCNC: 8.6 MG/DL (ref 8.6–10.5)
CHLORIDE SERPL-SCNC: 104 MMOL/L (ref 98–107)
CO2 SERPL-SCNC: 18 MMOL/L (ref 22–29)
CREAT SERPL-MCNC: 0.73 MG/DL (ref 0.57–1)
D DIMER PPP FEU-MCNC: 3.21 MCGFEU/ML (ref 0–0.5)
DEPRECATED RDW RBC AUTO: 46.5 FL (ref 37–54)
EGFRCR SERPLBLD CKD-EPI 2021: 117.9 ML/MIN/1.73
EOSINOPHIL # BLD AUTO: 0.03 10*3/MM3 (ref 0–0.4)
EOSINOPHIL NFR BLD AUTO: 0.3 % (ref 0.3–6.2)
ERYTHROCYTE [DISTWIDTH] IN BLOOD BY AUTOMATED COUNT: 13.9 % (ref 12.3–15.4)
FLUAV SUBTYP SPEC NAA+PROBE: NOT DETECTED
FLUBV RNA ISLT QL NAA+PROBE: NOT DETECTED
GLOBULIN UR ELPH-MCNC: 3.1 GM/DL
GLUCOSE SERPL-MCNC: 124 MG/DL (ref 65–99)
HADV DNA SPEC NAA+PROBE: NOT DETECTED
HCOV 229E RNA SPEC QL NAA+PROBE: NOT DETECTED
HCOV HKU1 RNA SPEC QL NAA+PROBE: NOT DETECTED
HCOV NL63 RNA SPEC QL NAA+PROBE: NOT DETECTED
HCOV OC43 RNA SPEC QL NAA+PROBE: NOT DETECTED
HCT VFR BLD AUTO: 35.7 % (ref 34–46.6)
HGB BLD-MCNC: 12 G/DL (ref 12–15.9)
HMPV RNA NPH QL NAA+NON-PROBE: NOT DETECTED
HPIV1 RNA ISLT QL NAA+PROBE: NOT DETECTED
HPIV2 RNA SPEC QL NAA+PROBE: NOT DETECTED
HPIV3 RNA NPH QL NAA+PROBE: NOT DETECTED
HPIV4 P GENE NPH QL NAA+PROBE: NOT DETECTED
IMM GRANULOCYTES # BLD AUTO: 0.06 10*3/MM3 (ref 0–0.05)
IMM GRANULOCYTES NFR BLD AUTO: 0.6 % (ref 0–0.5)
LYMPHOCYTES # BLD AUTO: 1.12 10*3/MM3 (ref 0.7–3.1)
LYMPHOCYTES NFR BLD AUTO: 10.3 % (ref 19.6–45.3)
M PNEUMO IGG SER IA-ACNC: NOT DETECTED
MAGNESIUM SERPL-MCNC: 1.8 MG/DL (ref 1.6–2.6)
MCH RBC QN AUTO: 30.8 PG (ref 26.6–33)
MCHC RBC AUTO-ENTMCNC: 33.6 G/DL (ref 31.5–35.7)
MCV RBC AUTO: 91.5 FL (ref 79–97)
MONOCYTES # BLD AUTO: 0.72 10*3/MM3 (ref 0.1–0.9)
MONOCYTES NFR BLD AUTO: 6.6 % (ref 5–12)
NEUTROPHILS NFR BLD AUTO: 8.95 10*3/MM3 (ref 1.7–7)
NEUTROPHILS NFR BLD AUTO: 82 % (ref 42.7–76)
NRBC BLD AUTO-RTO: 0.1 /100 WBC (ref 0–0.2)
NT-PROBNP SERPL-MCNC: 36.2 PG/ML (ref 0–450)
PLATELET # BLD AUTO: 163 10*3/MM3 (ref 140–450)
PMV BLD AUTO: 10.8 FL (ref 6–12)
POTASSIUM SERPL-SCNC: 3.6 MMOL/L (ref 3.5–5.2)
PROT SERPL-MCNC: 6.3 G/DL (ref 6–8.5)
RBC # BLD AUTO: 3.9 10*6/MM3 (ref 3.77–5.28)
RHINOVIRUS RNA SPEC NAA+PROBE: NOT DETECTED
RSV RNA NPH QL NAA+NON-PROBE: NOT DETECTED
SARS-COV-2 RNA NPH QL NAA+NON-PROBE: NOT DETECTED
SODIUM SERPL-SCNC: 137 MMOL/L (ref 136–145)
TROPONIN T SERPL HS-MCNC: <6 NG/L
WBC NRBC COR # BLD: 10.9 10*3/MM3 (ref 3.4–10.8)

## 2023-08-06 PROCEDURE — 83880 ASSAY OF NATRIURETIC PEPTIDE: CPT | Performed by: EMERGENCY MEDICINE

## 2023-08-06 PROCEDURE — 99285 EMERGENCY DEPT VISIT HI MDM: CPT

## 2023-08-06 PROCEDURE — 93010 ELECTROCARDIOGRAM REPORT: CPT | Performed by: INTERNAL MEDICINE

## 2023-08-06 PROCEDURE — 25010000002 ONDANSETRON PER 1 MG: Performed by: EMERGENCY MEDICINE

## 2023-08-06 PROCEDURE — 93005 ELECTROCARDIOGRAM TRACING: CPT | Performed by: EMERGENCY MEDICINE

## 2023-08-06 PROCEDURE — 80053 COMPREHEN METABOLIC PANEL: CPT | Performed by: EMERGENCY MEDICINE

## 2023-08-06 PROCEDURE — 85379 FIBRIN DEGRADATION QUANT: CPT | Performed by: EMERGENCY MEDICINE

## 2023-08-06 PROCEDURE — 96374 THER/PROPH/DIAG INJ IV PUSH: CPT

## 2023-08-06 PROCEDURE — 85025 COMPLETE CBC W/AUTO DIFF WBC: CPT | Performed by: EMERGENCY MEDICINE

## 2023-08-06 PROCEDURE — 71275 CT ANGIOGRAPHY CHEST: CPT

## 2023-08-06 PROCEDURE — 83735 ASSAY OF MAGNESIUM: CPT | Performed by: EMERGENCY MEDICINE

## 2023-08-06 PROCEDURE — 84484 ASSAY OF TROPONIN QUANT: CPT | Performed by: EMERGENCY MEDICINE

## 2023-08-06 PROCEDURE — 0202U NFCT DS 22 TRGT SARS-COV-2: CPT | Performed by: EMERGENCY MEDICINE

## 2023-08-06 PROCEDURE — 25510000001 IOPAMIDOL PER 1 ML: Performed by: EMERGENCY MEDICINE

## 2023-08-06 RX ORDER — ONDANSETRON 2 MG/ML
4 INJECTION INTRAMUSCULAR; INTRAVENOUS ONCE
Status: COMPLETED | OUTPATIENT
Start: 2023-08-06 | End: 2023-08-06

## 2023-08-06 RX ORDER — SODIUM CHLORIDE 0.9 % (FLUSH) 0.9 %
10 SYRINGE (ML) INJECTION AS NEEDED
Status: DISCONTINUED | OUTPATIENT
Start: 2023-08-06 | End: 2023-08-06 | Stop reason: HOSPADM

## 2023-08-06 RX ORDER — ONDANSETRON 4 MG/1
4 TABLET, ORALLY DISINTEGRATING ORAL EVERY 8 HOURS PRN
Qty: 24 TABLET | Refills: 0 | Status: SHIPPED | OUTPATIENT
Start: 2023-08-06

## 2023-08-06 RX ORDER — ACETAMINOPHEN 500 MG
1000 TABLET ORAL ONCE
Status: COMPLETED | OUTPATIENT
Start: 2023-08-06 | End: 2023-08-06

## 2023-08-06 RX ADMIN — SODIUM CHLORIDE 1000 ML: 9 INJECTION, SOLUTION INTRAVENOUS at 16:58

## 2023-08-06 RX ADMIN — IOPAMIDOL 85 ML: 755 INJECTION, SOLUTION INTRAVENOUS at 18:20

## 2023-08-06 RX ADMIN — ONDANSETRON 4 MG: 2 INJECTION INTRAMUSCULAR; INTRAVENOUS at 17:02

## 2023-08-06 RX ADMIN — ACETAMINOPHEN 1000 MG: 500 TABLET, FILM COATED ORAL at 16:46

## 2023-08-06 NOTE — ED NOTES
PT presents to ED with complains of CP, diarrhea, dizziness and lower abd pain x2 days.     Ciara Sharma RN

## 2023-08-06 NOTE — ED PROVIDER NOTES
EMERGENCY DEPARTMENT ENCOUNTER    Room Number:  15/15  Date seen:  8/6/2023  PCP: Robert Guthrie MD  Historian: Patient      HPI:  Chief Complaint: Abdominal pain, diarrhea, shortness of breath lightheaded    A complete HPI/ROS/PMH/PSH/SH/FH are unobtainable due to: N/A    Context: Terri Gunter is a 22 y.o. female who presents to the ED with the above complaints starting yesterday.  She states she has had multiple bouts of loose, nonbloody diarrhea.  She felt dizzy and lightheaded in the shower earlier today and has been short of breath with vague chest tightness..  No fevers or chills.  No nausea no vomiting.  Urinary frequency, not uncommon for this stage in pregnancy.  No dysuria.  No known ill contacts.  She endorses fetal movement.  No bleeding or discharge.    G3, P2    Additional sources:  - Discussed/ obtained information from independent historians: No    - External (non-ED) record review: Patient is approximately 36 weeks pregnant.    - Chronic or social conditions impacting care: None which I am aware.      PAST MEDICAL HISTORY  Active Ambulatory Problems     Diagnosis Date Noted    No Active Ambulatory Problems     Resolved Ambulatory Problems     Diagnosis Date Noted    Encounter for supervision of normal first pregnancy in third trimester 01/24/2017    Screen for STD (sexually transmitted disease) 01/24/2017    Screening for HIV (human immunodeficiency virus) 01/24/2017    Screening for sickle-cell disease or trait 01/24/2017    Cystic fibrosis screening 01/24/2017    Encounter for drug screening 01/24/2017    Chlamydia infection affecting pregnancy in first trimester, antepartum 01/28/2017    Screening for diabetes mellitus 05/16/2017    Decreased fetus movements affecting management of mother in third trimester 06/27/2017    Asthma affecting pregnancy, antepartum 06/27/2017    Need for Tdap vaccination 07/11/2017    Group B Streptococcus carrier, +RV culture, currently pregnant 08/30/2017     Pregnancy 2017    Pregnancy 09/15/2017    Full-term premature rupture of membranes with onset of labor within 24 hours of rupture 2017    Vaginal delivery 2017    Chlamydia infection 2017    Chlamydia contact, treated 2017    Birth control counseling 2017    Encounter for initial prescription of contraceptive pills 2017    Multigravida in first trimester 2018    Screen for STD (sexually transmitted disease) 2018    Screening for HIV (human immunodeficiency virus) 2018    Encounter for drug screening 2018    Nausea and vomiting in pregnancy 2018    Asthma affecting pregnancy in first trimester 2018    Encounter for  screening for chromosomal anomalies 2019    Multigravida in second trimester 2019    Screening for diabetes mellitus 2019    Excessive weight gain during pregnancy in third trimester 05/15/2019    Maternal care for breech presentation, single gestation 2019    Breech presentation 2019    Pregnancy 2019    Pregnancy 2019    Maternal obesity affecting pregnancy, antepartum 08/10/2019     (normal spontaneous vaginal delivery) 2019     Past Medical History:   Diagnosis Date    Asthma     Chlamydia     Gonorrhea          PAST SURGICAL HISTORY  Past Surgical History:   Procedure Laterality Date    WISDOM TOOTH EXTRACTION           FAMILY HISTORY  Family History   Problem Relation Age of Onset    Diabetes Father     Breast cancer Neg Hx     Ovarian cancer Neg Hx     Uterine cancer Neg Hx     Colon cancer Neg Hx          SOCIAL HISTORY  Social History     Socioeconomic History    Marital status: Single   Tobacco Use    Smoking status: Former    Smokeless tobacco: Never   Vaping Use    Vaping Use: Never used   Substance and Sexual Activity    Alcohol use: No    Drug use: No    Sexual activity: Yes     Partners: Male     Birth control/protection: None         ALLERGIES  Patient  has no known allergies.        REVIEW OF SYSTEMS  Review of Systems   Constitutional:  Positive for fever (Low-grade).   Respiratory:  Positive for shortness of breath.    Gastrointestinal:  Positive for abdominal pain and diarrhea. Negative for blood in stool and vomiting.   Genitourinary:  Positive for frequency. Negative for dysuria and hematuria.          PHYSICAL EXAM  ED Triage Vitals [08/06/23 1629]   Temp Heart Rate Resp BP SpO2   99.5 øF (37.5 øC) 118 18 -- 100 %      Temp src Heart Rate Source Patient Position BP Location FiO2 (%)   Tympanic Monitor -- -- --       Physical Exam      Physical Exam   Constitutional: Pt. is oriented to person, place, and time.  She is well-developed, well-nourished, and in no distress.    HENT: Normocephalic and atraumatic. Pupils are equal, round, and reactive to light.   Neck: Normal range of motion. Neck supple. No JVD present. No tracheal deviation present.   Cardiovascular: Mildly tachycardic rate, regular rhythm and normal heart sounds.   Pulmonary/Chest: Effort normal and breath sounds normal. No stridor. No respiratory distress. No wheezes, no rales.   Abdominal: Soft.  Gravid. There is no tenderness. There is no rebound and no guarding.   Musculoskeletal: No edema, tenderness or deformity.   Neurological: She is alert and oriented to person, place, and time.  She has no focal neurologic deficits.  Skin: Skin is warm and dry. Pt. is not diaphoretic.  Psychiatric: Mood, affect normal.  She is pleasant and cooperative.  Nursing note and vitals reviewed.            LAB RESULTS  Recent Results (from the past 24 hour(s))   ECG 12 Lead Chest Pain    Collection Time: 08/06/23  4:43 PM   Result Value Ref Range    QT Interval 336 ms   Comprehensive Metabolic Panel    Collection Time: 08/06/23  4:56 PM    Specimen: Blood   Result Value Ref Range    Glucose 124 (H) 65 - 99 mg/dL    BUN 6 6 - 20 mg/dL    Creatinine 0.73 0.57 - 1.00 mg/dL    Sodium 137 136 - 145 mmol/L     Potassium 3.6 3.5 - 5.2 mmol/L    Chloride 104 98 - 107 mmol/L    CO2 18.0 (L) 22.0 - 29.0 mmol/L    Calcium 8.6 8.6 - 10.5 mg/dL    Total Protein 6.3 6.0 - 8.5 g/dL    Albumin 3.2 (L) 3.5 - 5.2 g/dL    ALT (SGPT) 22 1 - 33 U/L    AST (SGOT) 23 1 - 32 U/L    Alkaline Phosphatase 119 (H) 39 - 117 U/L    Total Bilirubin 0.4 0.0 - 1.2 mg/dL    Globulin 3.1 gm/dL    A/G Ratio 1.0 g/dL    BUN/Creatinine Ratio 8.2 7.0 - 25.0    Anion Gap 15.0 5.0 - 15.0 mmol/L    eGFR 117.9 >60.0 mL/min/1.73   BNP    Collection Time: 08/06/23  4:56 PM    Specimen: Blood   Result Value Ref Range    proBNP 36.2 0.0 - 450.0 pg/mL   Single High Sensitivity Troponin T    Collection Time: 08/06/23  4:56 PM    Specimen: Blood   Result Value Ref Range    HS Troponin T <6 <10 ng/L   Magnesium    Collection Time: 08/06/23  4:56 PM    Specimen: Blood   Result Value Ref Range    Magnesium 1.8 1.6 - 2.6 mg/dL   CBC Auto Differential    Collection Time: 08/06/23  4:56 PM    Specimen: Blood   Result Value Ref Range    WBC 10.90 (H) 3.40 - 10.80 10*3/mm3    RBC 3.90 3.77 - 5.28 10*6/mm3    Hemoglobin 12.0 12.0 - 15.9 g/dL    Hematocrit 35.7 34.0 - 46.6 %    MCV 91.5 79.0 - 97.0 fL    MCH 30.8 26.6 - 33.0 pg    MCHC 33.6 31.5 - 35.7 g/dL    RDW 13.9 12.3 - 15.4 %    RDW-SD 46.5 37.0 - 54.0 fl    MPV 10.8 6.0 - 12.0 fL    Platelets 163 140 - 450 10*3/mm3    Neutrophil % 82.0 (H) 42.7 - 76.0 %    Lymphocyte % 10.3 (L) 19.6 - 45.3 %    Monocyte % 6.6 5.0 - 12.0 %    Eosinophil % 0.3 0.3 - 6.2 %    Basophil % 0.2 0.0 - 1.5 %    Immature Grans % 0.6 (H) 0.0 - 0.5 %    Neutrophils, Absolute 8.95 (H) 1.70 - 7.00 10*3/mm3    Lymphocytes, Absolute 1.12 0.70 - 3.10 10*3/mm3    Monocytes, Absolute 0.72 0.10 - 0.90 10*3/mm3    Eosinophils, Absolute 0.03 0.00 - 0.40 10*3/mm3    Basophils, Absolute 0.02 0.00 - 0.20 10*3/mm3    Immature Grans, Absolute 0.06 (H) 0.00 - 0.05 10*3/mm3    nRBC 0.1 0.0 - 0.2 /100 WBC   D-dimer, Quantitative    Collection Time: 08/06/23  4:56  PM    Specimen: Blood   Result Value Ref Range    D-Dimer, Quantitative 3.21 (H) 0.00 - 0.50 MCGFEU/mL   Respiratory Panel PCR w/COVID-19(SARS-CoV-2) HITESH/DEBRA/JEREMIAH/PAD/COR/MAD/ARTI In-House, NP Swab in UTM/VTM, 3-4 HR TAT - Swab, Nasopharynx    Collection Time: 08/06/23  4:59 PM    Specimen: Nasopharynx; Swab   Result Value Ref Range    ADENOVIRUS, PCR Not Detected Not Detected    Coronavirus 229E Not Detected Not Detected    Coronavirus HKU1 Not Detected Not Detected    Coronavirus NL63 Not Detected Not Detected    Coronavirus OC43 Not Detected Not Detected    COVID19 Not Detected Not Detected - Ref. Range    Human Metapneumovirus Not Detected Not Detected    Human Rhinovirus/Enterovirus Not Detected Not Detected    Influenza A PCR Not Detected Not Detected    Influenza B PCR Not Detected Not Detected    Parainfluenza Virus 1 Not Detected Not Detected    Parainfluenza Virus 2 Not Detected Not Detected    Parainfluenza Virus 3 Not Detected Not Detected    Parainfluenza Virus 4 Not Detected Not Detected    RSV, PCR Not Detected Not Detected    Bordetella pertussis pcr Not Detected Not Detected    Bordetella parapertussis PCR Not Detected Not Detected    Chlamydophila pneumoniae PCR Not Detected Not Detected    Mycoplasma pneumo by PCR Not Detected Not Detected       Ordered the above labs and reviewed the results.        RADIOLOGY  CT Angiogram Chest    Result Date: 8/6/2023  CT ANGIOGRAM OF THE CHEST WITH CONTRAST INCLUDING RECONSTRUCTION IMAGES 08/06/2023  HISTORY: Chest pain. Shortness of breath. Elevated d-dimer. Possible pulmonary embolus.  Following the intravenous contrast injection, CT angiography was performed through the chest. Sagittal, coronal and 3-D reconstruction images were reviewed.  The pulmonary arterial system is well opacified with no evidence of pulmonary embolus. Small amount of increased attenuation is seen in the anterior mediastinum likely residual thymus tissue. No pathologically enlarged  lymph nodes are seen. No aortic aneurysm is seen. There is some minimal suspected atelectasis in the subpleural aspect of both lower lobes. No suspicious lung masses are seen.      No evidence of pulmonary embolus.  Radiation dose reduction techniques were utilized, including automated exposure control and exposure modulation based on body size.        Ordered the above noted radiological studies. Reviewed by me in PACS.        PROCEDURES  Procedures      MEDICATIONS GIVEN IN ER  Medications   sodium chloride 0.9 % flush 10 mL (has no administration in time range)   sodium chloride 0.9 % bolus 1,000 mL (1,000 mL Intravenous New Bag 8/6/23 1658)   acetaminophen (TYLENOL) tablet 1,000 mg (1,000 mg Oral Given 8/6/23 1646)   ondansetron (ZOFRAN) injection 4 mg (4 mg Intravenous Given 8/6/23 1702)   iopamidol (ISOVUE-370) 76 % injection 100 mL (85 mL Intravenous Given by Other 8/6/23 1820)             MEDICAL DECISION MAKING, PROGRESS, and CONSULTS    All labs have been independently reviewed by me.  All radiology studies have been reviewed by me and discussed with radiologist dictating the report.   EKG's independently viewed and interpreted by me.  Discussion below represents my analysis of pertinent findings related to patient's condition, differential diagnosis, treatment plan and final disposition.      Orders placed during this visit:  Orders Placed This Encounter   Procedures    Respiratory Panel PCR w/COVID-19(SARS-CoV-2) HITESH/DEBRA/JEREMIAH/PAD/COR/MAD/ARTI In-House, NP Swab in UTM/VTM, 3-4 HR TAT - Swab, Nasopharynx    CT Angiogram Chest    Comprehensive Metabolic Panel    BNP    Single High Sensitivity Troponin T    Magnesium    CBC Auto Differential    D-dimer, Quantitative    Discontinue Patient Isolation    ECG 12 Lead Chest Pain    Insert Peripheral IV    CBC & Differential       Additional orders considered but not ordered:  N/A    Differential diagnosis:  DDx includes but is not limited to: Cholecystitis,  choledocholithiasis, cholangitis, peritonitis, pancreatitis/pseudocyst, peptic ulcer, bowel obstruction/ileus, constipation, diverticulitis/perforation/abscess, inflammatory bowel disease, renal colic/stone, urinary tract infection/pyelonephritis, prostatitis, mesenteric ischemia, expanding/leaking AAA, testicular/ovarian torsion.    Differential diagnosis for chest pain/dyspnea includes but is not limited to:  Muscle strain, costochondritis, myositis, pleurisy, rib fracture, intercostal neuritis, herpes zoster, tumor, myocardial infarction, coronary syndrome, unstable angina, angina, aortic dissection, mitral valve prolapse, pericarditis, palpitations, pulmonary embolus, pneumonia, pneumothorax, lung cancer, GERD, esophagitis, esophageal spasm      Independent interpretation of labs, radiology studies, and discussions with consultants:  ED Course as of 08/06/23 1848   Sun Aug 06, 2023   1645 EKG performed at 1643 and interpreted by me shows sinus tachycardia with a rate of 106 bpm.  WY intervals, QRS complexes and ST-T segments unremarkable.  There are no prior EKGs available for comparison. [WC]   1646 Patient is 1 years symptoms (no clinical signs of DVT, no hemoptysis, PE is not the most likely diagnosis.)-We will check D-dimer given report of shortness of breath.. [WC]   1652 Informed by RN that patient is vomiting brownish emesis-Zofran ordered. [WC]   1717 CBC is unremarkable. [WC]   1724 D-Dimer, Quant(!): 3.21  Given complaints of chest pain and shortness of breath, combined with pregnancy and a D-dimer greater than 1000 ng/mL, CT a of the chest will need to be obtained to rule out PE. [WC]   1728 proBNP: 36.2 [WC]   1732 Glucose(!): 124  CMP otherwise unremarkable. [WC]   1732 Magnesium: 1.8  Normal. [WC]   1740 HS Troponin T: <6 [WC]   1801 Respiratory viral panel is normal. [WC]   1847 CT angiogram of the chest was underbelly visualized by me and discussed with Dr. Corcoran (radiology)-there is no  evidence of pulmonary embolism.  For official interpretation, see dictated report. [WC]      ED Course User Index  [WC] Rashid Ross MD              Appropriate PPE was worn by myself and the patient throughout our entire interaction.    DIAGNOSIS  Final diagnoses:   Viral syndrome         DISPOSITION  Discharge            Latest Documented Vital Signs:  As of 18:48 EDT  BP- 94/64 HR- 102 Temp- 99.5 øF (37.5 øC) (Tympanic) O2 sat- 96%        --    Please note that portions of this were completed with a voice recognition program.       Note Disclaimer: At [facility], we believe that sharing information builds trust and better relationships. You are receiving this note because you are receiving care at [facility] or recently visited. It is possible you will see health information before a provider has talked with you about it. This kind of information can be easy to misunderstand. To help you fully understand what it means for your health, we urge you to discuss this note with your provider.             Rashid Ross MD  08/06/23 2944

## 2023-08-07 ENCOUNTER — HOSPITAL ENCOUNTER (EMERGENCY)
Facility: HOSPITAL | Age: 23
Discharge: HOME | End: 2023-08-07
Attending: OBSTETRICS & GYNECOLOGY | Admitting: OBSTETRICS & GYNECOLOGY
Payer: COMMERCIAL

## 2023-08-07 ENCOUNTER — TELEPHONE (OUTPATIENT)
Dept: OBSTETRICS AND GYNECOLOGY | Facility: CLINIC | Age: 23
End: 2023-08-07
Payer: COMMERCIAL

## 2023-08-07 VITALS
TEMPERATURE: 97.8 F | HEART RATE: 92 BPM | BODY MASS INDEX: 39.94 KG/M2 | HEIGHT: 65 IN | DIASTOLIC BLOOD PRESSURE: 59 MMHG | SYSTOLIC BLOOD PRESSURE: 118 MMHG | RESPIRATION RATE: 18 BRPM | OXYGEN SATURATION: 98 %

## 2023-08-07 LAB
BACTERIA UR QL AUTO: ABNORMAL /HPF
BILIRUB UR QL STRIP: NEGATIVE
CLARITY UR: CLEAR
COLOR UR: YELLOW
GLUCOSE UR STRIP-MCNC: NEGATIVE MG/DL
HGB UR QL STRIP.AUTO: ABNORMAL
HYALINE CASTS UR QL AUTO: ABNORMAL /LPF
KETONES UR QL STRIP: ABNORMAL
LEUKOCYTE ESTERASE UR QL STRIP.AUTO: ABNORMAL
NITRITE UR QL STRIP: NEGATIVE
PH UR STRIP.AUTO: 6 [PH] (ref 5–8)
PROT UR QL STRIP: ABNORMAL
QT INTERVAL: 336 MS
RBC # UR STRIP: ABNORMAL /HPF
REF LAB TEST METHOD: ABNORMAL
SP GR UR STRIP: 1.02 (ref 1–1.03)
SQUAMOUS #/AREA URNS HPF: ABNORMAL /HPF
UROBILINOGEN UR QL STRIP: ABNORMAL
WBC # UR STRIP: ABNORMAL /HPF

## 2023-08-07 PROCEDURE — 87086 URINE CULTURE/COLONY COUNT: CPT | Performed by: OBSTETRICS & GYNECOLOGY

## 2023-08-07 PROCEDURE — 59020 FETAL CONTRACT STRESS TEST: CPT

## 2023-08-07 PROCEDURE — 81001 URINALYSIS AUTO W/SCOPE: CPT | Performed by: OBSTETRICS & GYNECOLOGY

## 2023-08-07 PROCEDURE — 99284 EMERGENCY DEPT VISIT MOD MDM: CPT | Performed by: OBSTETRICS & GYNECOLOGY

## 2023-08-07 PROCEDURE — 59025 FETAL NON-STRESS TEST: CPT | Performed by: OBSTETRICS & GYNECOLOGY

## 2023-08-07 NOTE — TELEPHONE ENCOUNTER
Left message for Terri that Dr Riojas recommends you go to [facility] now for evaluation. Thank you

## 2023-08-07 NOTE — OBED NOTES
[facility] Note [facility]        Patient Name: Terri Gunter  YOB: 2000  MRN: [id number]  Admission Date: 2023 11:59 AM  Date of Service: 2023    Chief Complaint: Contractions (PAULO - Pt complains of karina since yesterday night. +FM, -LF, -VB)        Subjective     Terri Gunter is a 22 y.o. female  at 35w3d with Estimated Date of Delivery: 23 who presents with the chief complaint listed above.  She sees Emili Riojas MD for her prenatal care. Her pregnancy has been complicated by: Maternal obesity with BMI of 39.94, weight 240 pounds.  Patient presenting at this time for contractions that began last night and have gotten stronger over time.  She is feeling normal fetal movement.  She denies loss of fluid or vaginal bleeding.            Objective   There are no problems to display for this patient.       OB History    Para Term  AB Living   3 2 2 0 0 2   SAB IAB Ectopic Molar Multiple Live Births   0 0 0 0 0 2      # Outcome Date GA Lbr Brain/2nd Weight Sex Delivery Anes PTL Lv   3 Current            2 Term 19 39w4d 05:07 / 00:06 3407 g (7 lb 8.2 oz) M Vag-Spont EPI N DUSTY      Birth Comments: infant scale #1      Name: SILVESTRE GUNTER      Apgar1: 8  Apgar5: 9   1 Term 17 40w3d 21:50 / 01:35 3669 g (8 lb 1.4 oz) M Vag-Spont EPI N DUSTY      Birth Comments: Scale 4      Name: SILVESTRE GUNTER      Apgar1: 8  Apgar5: 9        Past Medical History:   Diagnosis Date    Asthma     states has misplaced inhaler, hasn't used inhaler since 2  mo pregnant    Chlamydia     Gonorrhea     Multigravida in first trimester 2018       Past Surgical History:   Procedure Laterality Date    WISDOM TOOTH EXTRACTION         Current Facility-Administered Medications on File Prior to Encounter   Medication Dose Route Frequency Provider Last Rate Last Admin    [COMPLETED] acetaminophen (TYLENOL) tablet 1,000 mg  1,000 mg Oral Once Rashid Ross MD    1,000 mg at 08/06/23 1646    [COMPLETED] iopamidol (ISOVUE-370) 76 % injection 100 mL  100 mL Intravenous Once in imaging Rashid Ross MD   85 mL at 08/06/23 1820    [COMPLETED] ondansetron (ZOFRAN) injection 4 mg  4 mg Intravenous Once Rashid Ross MD   4 mg at 08/06/23 1702    [COMPLETED] sodium chloride 0.9 % bolus 1,000 mL  1,000 mL Intravenous Once Rashid Ross MD   Stopped at 08/06/23 1849    [DISCONTINUED] sodium chloride 0.9 % flush 10 mL  10 mL Intravenous PRN Rashid Ross MD         Current Outpatient Medications on File Prior to Encounter   Medication Sig Dispense Refill    acetaminophen (TYLENOL) 500 MG tablet Take 1 tablet by mouth Every 4 (Four) Hours As Needed for Mild Pain or Fever. 12 tablet 0    famotidine (Pepcid) 20 MG tablet Take 1 tablet by mouth Daily. 30 tablet 1    ondansetron ODT (ZOFRAN-ODT) 4 MG disintegrating tablet Place 1 tablet on the tongue Every 8 (Eight) Hours As Needed for Nausea or Vomiting. 24 tablet 0    Prenatal Vit-Fe Fumarate-FA (prenatal vitamin 27-0.8) 27-0.8 MG tablet tablet Take  by mouth Daily.         No Known Allergies    Family History   Problem Relation Age of Onset    Diabetes Father     Breast cancer Neg Hx     Ovarian cancer Neg Hx     Uterine cancer Neg Hx     Colon cancer Neg Hx        Social History     Socioeconomic History    Marital status: Single   Tobacco Use    Smoking status: Former    Smokeless tobacco: Never   Vaping Use    Vaping Use: Never used   Substance and Sexual Activity    Alcohol use: No    Drug use: No    Sexual activity: Yes     Partners: Male     Birth control/protection: None           Review of Systems   Constitutional:  Negative for chills, fatigue and fever.   HENT: Negative.     Eyes:  Negative for photophobia and visual disturbance.   Respiratory:  Negative for cough, chest tightness and shortness of breath.    Cardiovascular:  Negative for chest pain and leg swelling.   Gastrointestinal:  Positive for abdominal  "pain (intermittent abdominal pain). Negative for diarrhea, nausea and vomiting.   Genitourinary:  Negative for dysuria, flank pain, frequency, hematuria, pelvic pain, urgency, vaginal bleeding and vaginal discharge.   Musculoskeletal:  Negative for back pain.   Neurological:  Negative for dizziness, seizures, weakness and headaches.        PHYSICAL EXAM:      VITAL SIGNS:  Vitals:    08/07/23 1211 08/07/23 1217   BP: 118/59    BP Location: Right arm    Patient Position: Sitting    Pulse: 92    Resp: 18    Temp: 97.8 øF (36.6 øC)    TempSrc: Oral    SpO2: 98%    Height:  165.1 cm (65\")        FHT'S:                   Baseline:  135 BPM  Variability:  Moderate = 6 - 25 BPM  Accelerations:  15 x 15 accelerations present     Decelerations:  absent  Contractions:    Minimal irregular contractions      Interpretation:   Reactive NST, CAT 1 tracing        PHYSICAL EXAM:    General: well developed; well nourished  no acute distress   Heart: Not performed.   Lungs: breathing is unlabored     Abdomen: soft, non-tender; no masses  no umbilical or inguinal hernias are present       Cervix: was examined by nurse  Initial exam  Cervical Dilation (cm): 0  Cervical Effacement: 0%  Fetal Station: -4  Cervical Consistency: medium  Cervical Position: posterior    Repeat exam  Cervical Dilation (cm): 0  Cervical Effacement: 0%  Fetal Station: -4  Cervical Consistency: medium  Cervical Position: posterior   Contractions: Minimal irregular        Extremities: peripheral pulses normal, no pedal edema, no clubbing or cyanosis      LABS AND TESTING ORDERED:  Uterine and fetal monitoring  Urinalysis  Observation for labor over time    LAB RESULTS:    Recent Results (from the past 24 hour(s))   ECG 12 Lead Chest Pain    Collection Time: 08/06/23  4:43 PM   Result Value Ref Range    QT Interval 336 ms   Comprehensive Metabolic Panel    Collection Time: 08/06/23  4:56 PM    Specimen: Blood   Result Value Ref Range    Glucose 124 (H) 65 - 99 " mg/dL    BUN 6 6 - 20 mg/dL    Creatinine 0.73 0.57 - 1.00 mg/dL    Sodium 137 136 - 145 mmol/L    Potassium 3.6 3.5 - 5.2 mmol/L    Chloride 104 98 - 107 mmol/L    CO2 18.0 (L) 22.0 - 29.0 mmol/L    Calcium 8.6 8.6 - 10.5 mg/dL    Total Protein 6.3 6.0 - 8.5 g/dL    Albumin 3.2 (L) 3.5 - 5.2 g/dL    ALT (SGPT) 22 1 - 33 U/L    AST (SGOT) 23 1 - 32 U/L    Alkaline Phosphatase 119 (H) 39 - 117 U/L    Total Bilirubin 0.4 0.0 - 1.2 mg/dL    Globulin 3.1 gm/dL    A/G Ratio 1.0 g/dL    BUN/Creatinine Ratio 8.2 7.0 - 25.0    Anion Gap 15.0 5.0 - 15.0 mmol/L    eGFR 117.9 >60.0 mL/min/1.73   BNP    Collection Time: 08/06/23  4:56 PM    Specimen: Blood   Result Value Ref Range    proBNP 36.2 0.0 - 450.0 pg/mL   Single High Sensitivity Troponin T    Collection Time: 08/06/23  4:56 PM    Specimen: Blood   Result Value Ref Range    HS Troponin T <6 <10 ng/L   Magnesium    Collection Time: 08/06/23  4:56 PM    Specimen: Blood   Result Value Ref Range    Magnesium 1.8 1.6 - 2.6 mg/dL   CBC Auto Differential    Collection Time: 08/06/23  4:56 PM    Specimen: Blood   Result Value Ref Range    WBC 10.90 (H) 3.40 - 10.80 10*3/mm3    RBC 3.90 3.77 - 5.28 10*6/mm3    Hemoglobin 12.0 12.0 - 15.9 g/dL    Hematocrit 35.7 34.0 - 46.6 %    MCV 91.5 79.0 - 97.0 fL    MCH 30.8 26.6 - 33.0 pg    MCHC 33.6 31.5 - 35.7 g/dL    RDW 13.9 12.3 - 15.4 %    RDW-SD 46.5 37.0 - 54.0 fl    MPV 10.8 6.0 - 12.0 fL    Platelets 163 140 - 450 10*3/mm3    Neutrophil % 82.0 (H) 42.7 - 76.0 %    Lymphocyte % 10.3 (L) 19.6 - 45.3 %    Monocyte % 6.6 5.0 - 12.0 %    Eosinophil % 0.3 0.3 - 6.2 %    Basophil % 0.2 0.0 - 1.5 %    Immature Grans % 0.6 (H) 0.0 - 0.5 %    Neutrophils, Absolute 8.95 (H) 1.70 - 7.00 10*3/mm3    Lymphocytes, Absolute 1.12 0.70 - 3.10 10*3/mm3    Monocytes, Absolute 0.72 0.10 - 0.90 10*3/mm3    Eosinophils, Absolute 0.03 0.00 - 0.40 10*3/mm3    Basophils, Absolute 0.02 0.00 - 0.20 10*3/mm3    Immature Grans, Absolute 0.06 (H) 0.00 - 0.05  10*3/mm3    nRBC 0.1 0.0 - 0.2 /100 WBC   D-dimer, Quantitative    Collection Time: 08/06/23  4:56 PM    Specimen: Blood   Result Value Ref Range    D-Dimer, Quantitative 3.21 (H) 0.00 - 0.50 MCGFEU/mL   Respiratory Panel PCR w/COVID-19(SARS-CoV-2) HITESH/DEBRA/JEREMIAH/PAD/COR/MAD/ARTI In-House, NP Swab in UTM/VTM, 3-4 HR TAT - Swab, Nasopharynx    Collection Time: 08/06/23  4:59 PM    Specimen: Nasopharynx; Swab   Result Value Ref Range    ADENOVIRUS, PCR Not Detected Not Detected    Coronavirus 229E Not Detected Not Detected    Coronavirus HKU1 Not Detected Not Detected    Coronavirus NL63 Not Detected Not Detected    Coronavirus OC43 Not Detected Not Detected    COVID19 Not Detected Not Detected - Ref. Range    Human Metapneumovirus Not Detected Not Detected    Human Rhinovirus/Enterovirus Not Detected Not Detected    Influenza A PCR Not Detected Not Detected    Influenza B PCR Not Detected Not Detected    Parainfluenza Virus 1 Not Detected Not Detected    Parainfluenza Virus 2 Not Detected Not Detected    Parainfluenza Virus 3 Not Detected Not Detected    Parainfluenza Virus 4 Not Detected Not Detected    RSV, PCR Not Detected Not Detected    Bordetella pertussis pcr Not Detected Not Detected    Bordetella parapertussis PCR Not Detected Not Detected    Chlamydophila pneumoniae PCR Not Detected Not Detected    Mycoplasma pneumo by PCR Not Detected Not Detected   Urinalysis With Culture If Indicated - Urine, Clean Catch    Collection Time: 08/07/23 12:15 PM    Specimen: Urine, Clean Catch   Result Value Ref Range    Color, UA Yellow Yellow, Straw    Appearance, UA Clear Clear    pH, UA 6.0 5.0 - 8.0    Specific Gravity, UA 1.019 1.005 - 1.030    Glucose, UA Negative Negative    Ketones, UA 15 mg/dL (1+) (A) Negative    Bilirubin, UA Negative Negative    Blood, UA Small (1+) (A) Negative    Protein,  mg/dL (2+) (A) Negative    Leuk Esterase, UA Trace (A) Negative    Nitrite, UA Negative Negative    Urobilinogen, UA 1.0  "E.U./dL 0.2 - 1.0 E.U./dL   Urinalysis, Microscopic Only - Urine, Clean Catch    Collection Time: 23 12:15 PM    Specimen: Urine, Clean Catch   Result Value Ref Range    RBC, UA 3-5 (A) None Seen, 0-2 /HPF    WBC, UA 0-2 None Seen, 0-2 /HPF    Bacteria, UA None Seen None Seen /HPF    Squamous Epithelial Cells, UA 3-6 (A) None Seen, 0-2 /HPF    Hyaline Casts, UA None Seen None Seen /LPF    Methodology Manual Light Microscopy        Lab Results   Component Value Date    ABO O 2023    RH Positive 2023       Lab Results   Component Value Date    STREPGPB Negative 07/10/2019                 Assessment & Plan   Terri Gunter is a 22 y.o. female  at 35w3d who presented with irregular contractions since last night.   She was observed for labor but was not karina and did not change her cervix which was noted to be Cervical Dilation (cm): 0 cm/ Cervical Effacement: 0% % effaced/ vertex at Fetal Station: -4 station.  She was noted to have a Reactive NST and was watched for approximately 2 hour(s).   In view of no cervical change and irregular contractions she was discharged to home.    Final Impression:  Pregnancy at 35w3d    False labor before 37 weeks gestation in the third trimester  Reactive NST, CAT 1 tracing, Reassuring fetal status  UA with no evidence of UTI but 2+ proteinuria is noted, blood pressure is normal, no edema or symptoms preeclampsia  Maternal vital signs were reviewed and were unremarkable                   Vitals:    23 1211 23 1217   BP: 118/59    BP Location: Right arm    Patient Position: Sitting    Pulse: 92    Resp: 18    Temp: 97.8 øF (36.6 øC)    TempSrc: Oral    SpO2: 98%    Height:  165.1 cm (65\")     She was discharged to home     PLAN:  Discharge to home  She will continue her home meds          Your medication list        CONTINUE taking these medications        Instructions Last Dose Given Next Dose Due   acetaminophen 500 MG " tablet  Commonly known as: TYLENOL      Take 1 tablet by mouth Every 4 (Four) Hours As Needed for Mild Pain or Fever.       famotidine 20 MG tablet  Commonly known as: Pepcid      Take 1 tablet by mouth Daily.       ondansetron ODT 4 MG disintegrating tablet  Commonly known as: ZOFRAN-ODT      Place 1 tablet on the tongue Every 8 (Eight) Hours As Needed for Nausea or Vomiting.       prenatal vitamin 27-0.8 27-0.8 MG tablet tablet      Take  by mouth Daily.                She will follow up with Emili Riojas MD as scheduled and as needed  She has been instructed to return for contractions, vaginal bleeding, Rupture of membranes, decreased fetal movement or other concern  She may call the office or [facility] with questions.    I have spent 30 minutes including face to face time with the patient, greater than 50% in discussion of the diagnosis (counseling) and/or coordination of care.     You Jacques MD  8/7/2023  14:01 EDT  OB Hospitalist  Phone:    [phone]

## 2023-08-07 NOTE — TELEPHONE ENCOUNTER
[facility]w 3d [facility] see below and advise.     Good morning, I went to ER yesterday was having really bad chest pain and felt really dizzy. All results came back normal, but I am now in so much pain. It's my lower back, abdominal area, and pelvis area. I could barely sleep last night the pain is so bad. I've also been urinating more than I usually do which is probably normal.       Thanks,   Keyla

## 2023-08-07 NOTE — TELEPHONE ENCOUNTER
Arlene, can you please call patient and tell her that I would like her to go to labor and delivery at [facility] to be evaluated.

## 2023-08-08 LAB — BACTERIA SPEC AEROBE CULT: NO GROWTH

## 2023-08-09 ENCOUNTER — ROUTINE PRENATAL (OUTPATIENT)
Dept: OBSTETRICS AND GYNECOLOGY | Facility: CLINIC | Age: 23
End: 2023-08-09
Payer: COMMERCIAL

## 2023-08-09 VITALS — SYSTOLIC BLOOD PRESSURE: 117 MMHG | WEIGHT: 250.2 LBS | DIASTOLIC BLOOD PRESSURE: 78 MMHG | BODY MASS INDEX: 41.64 KG/M2

## 2023-08-09 DIAGNOSIS — Z13.89 SCREENING FOR BLOOD OR PROTEIN IN URINE: ICD-10-CM

## 2023-08-09 DIAGNOSIS — Z3A.35 35 WEEKS GESTATION OF PREGNANCY: ICD-10-CM

## 2023-08-09 DIAGNOSIS — O99.210 MATERNAL OBESITY AFFECTING PREGNANCY, ANTEPARTUM: Primary | ICD-10-CM

## 2023-08-09 LAB
GLUCOSE UR STRIP-MCNC: NEGATIVE MG/DL
PROT UR STRIP-MCNC: NEGATIVE MG/DL

## 2023-08-09 NOTE — PROGRESS NOTES
Chief Complaint   Patient presents with    Routine Prenatal Visit     HPI- Pt is 22 y.o.  at 35w5d here for prenatal visit.  Patient was seen in OB ED on Monday for contraction-like pain.  Her cervix was closed and she made no cervical change.  She reports she continues to feel contractions, but they are irregular.  She admits that she is not maintaining hydration.    ROS-     - No vaginal bleeding    GI- No abdominal pain    /78   Wt 113 kg (250 lb 3.2 oz)   LMP 2022   BMI 41.64 kg/mý   Exam - See flow sheet    Fetal heart rate is normal    Assessment-  Diagnoses and all orders for this visit:    Maternal obesity affecting pregnancy, antepartum    35 weeks gestation of pregnancy      Patient was advised to increase water intake to help with contraction-like pain.  Labor precautions were discussed.  She will follow-up weekly and I discussed cervical exam with her visit next week.

## 2023-08-14 ENCOUNTER — TELEPHONE (OUTPATIENT)
Dept: OBSTETRICS AND GYNECOLOGY | Facility: CLINIC | Age: 23
End: 2023-08-14
Payer: COMMERCIAL

## 2023-08-14 NOTE — TELEPHONE ENCOUNTER
----- Message from Emili Riojas MD sent at 8/14/2023  7:58 AM EDT -----  Please let patient know her urine culture was negative for infection.

## 2023-08-16 ENCOUNTER — ROUTINE PRENATAL (OUTPATIENT)
Dept: OBSTETRICS AND GYNECOLOGY | Facility: CLINIC | Age: 23
End: 2023-08-16
Payer: COMMERCIAL

## 2023-08-16 VITALS — SYSTOLIC BLOOD PRESSURE: 114 MMHG | WEIGHT: 249.4 LBS | DIASTOLIC BLOOD PRESSURE: 74 MMHG | BODY MASS INDEX: 41.5 KG/M2

## 2023-08-16 DIAGNOSIS — O99.210 MATERNAL OBESITY AFFECTING PREGNANCY, ANTEPARTUM: Primary | ICD-10-CM

## 2023-08-16 DIAGNOSIS — Z36.85 ANTENATAL SCREENING FOR STREPTOCOCCUS B: ICD-10-CM

## 2023-08-16 DIAGNOSIS — Z3A.36 36 WEEKS GESTATION OF PREGNANCY: ICD-10-CM

## 2023-08-16 NOTE — PROGRESS NOTES
Chief Complaint   Patient presents with    Routine Prenatal Visit     HPI- Pt is 22 y.o.  at 36w5d here for prenatal visit.  She has no major complaints today.  She does feel irregular contractions.  She reports good fetal movement.    ROS-     - No vaginal bleeding    GI- No abdominal pain    /74   Wt 113 kg (249 lb 6.4 oz)   LMP 2022   BMI 41.50 kg/mý   Exam - See flow sheet    Fetal heart rate is normal    Assessment-  Diagnoses and all orders for this visit:    Maternal obesity affecting pregnancy, antepartum     screening for streptococcus B  -     Group B Streptococcus Culture - Swab, Vaginal/Rectum    36 weeks gestation of pregnancy      Labor precautions reviewed.  GBS culture was obtained and she will follow-up weekly.

## 2023-08-20 LAB — B-HEM STREP SPEC QL CULT: NEGATIVE

## 2023-08-23 ENCOUNTER — ROUTINE PRENATAL (OUTPATIENT)
Dept: OBSTETRICS AND GYNECOLOGY | Facility: CLINIC | Age: 23
End: 2023-08-23
Payer: COMMERCIAL

## 2023-08-23 VITALS — SYSTOLIC BLOOD PRESSURE: 107 MMHG | DIASTOLIC BLOOD PRESSURE: 71 MMHG | WEIGHT: 247 LBS | BODY MASS INDEX: 41.1 KG/M2

## 2023-08-23 DIAGNOSIS — Z34.83 ENCOUNTER FOR SUPERVISION OF OTHER NORMAL PREGNANCY IN THIRD TRIMESTER: Primary | ICD-10-CM

## 2023-08-23 DIAGNOSIS — Z13.89 SCREENING FOR BLOOD OR PROTEIN IN URINE: ICD-10-CM

## 2023-08-23 LAB
GLUCOSE UR STRIP-MCNC: NEGATIVE MG/DL
PROT UR STRIP-MCNC: NEGATIVE MG/DL

## 2023-08-23 NOTE — PROGRESS NOTES
Chief Complaint   Patient presents with    Routine Prenatal Visit     HPI- Pt is 22 y.o.  at 37w5d here for prenatal visit.  She continues to feel contractions, but states they have not worsened.  She does report good fetal movement.    ROS-     - No vaginal bleeding    GI- No abdominal pain    /71   Wt 112 kg (247 lb)   LMP 2022   BMI 41.10 kg/mý   Exam - See flow sheet    Fetal heart rate is normal    Assessment-  Diagnoses and all orders for this visit:    Encounter for supervision of other normal pregnancy in third trimester    Reviewed negative GBS culture.  Labor precautions discussed.  She will follow-up in 1 week.

## 2023-08-30 ENCOUNTER — TELEPHONE (OUTPATIENT)
Dept: OBSTETRICS AND GYNECOLOGY | Facility: CLINIC | Age: 23
End: 2023-08-30

## 2023-08-30 ENCOUNTER — ROUTINE PRENATAL (OUTPATIENT)
Dept: OBSTETRICS AND GYNECOLOGY | Facility: CLINIC | Age: 23
End: 2023-08-30
Payer: COMMERCIAL

## 2023-08-30 VITALS — BODY MASS INDEX: 41.4 KG/M2 | WEIGHT: 248.8 LBS | SYSTOLIC BLOOD PRESSURE: 114 MMHG | DIASTOLIC BLOOD PRESSURE: 74 MMHG

## 2023-08-30 DIAGNOSIS — Z34.83 ENCOUNTER FOR SUPERVISION OF OTHER NORMAL PREGNANCY IN THIRD TRIMESTER: Primary | ICD-10-CM

## 2023-08-30 NOTE — PROGRESS NOTES
Chief Complaint   Patient presents with    Routine Prenatal Visit     HPI- Pt is 22 y.o.  at 38w5d here for prenatal visit.  Patient reports no major changes since last week.  She does report good fetal movement.    ROS-     - No vaginal bleeding    GI- No abdominal pain    /74   Wt 113 kg (248 lb 12.8 oz)   LMP 2022   BMI 41.40 kg/mý   Exam - See flow sheet    Fetal heart rate is normal    Assessment-  Diagnoses and all orders for this visit:    Encounter for supervision of other normal pregnancy in third trimester  -     Labor Induction; Future    She is wanting to proceed with elective induction.  We will plan a Pitocin induction next Wednesday and the induction process was discussed with her.

## 2023-09-05 ENCOUNTER — HOSPITAL ENCOUNTER (EMERGENCY)
Facility: HOSPITAL | Age: 23
Discharge: HOME | End: 2023-09-05
Attending: OBSTETRICS & GYNECOLOGY | Admitting: OBSTETRICS & GYNECOLOGY
Payer: COMMERCIAL

## 2023-09-05 VITALS
SYSTOLIC BLOOD PRESSURE: 121 MMHG | HEIGHT: 65 IN | BODY MASS INDEX: 41.38 KG/M2 | TEMPERATURE: 97.9 F | OXYGEN SATURATION: 99 % | WEIGHT: 248.4 LBS | DIASTOLIC BLOOD PRESSURE: 74 MMHG | RESPIRATION RATE: 18 BRPM | HEART RATE: 94 BPM

## 2023-09-05 DIAGNOSIS — Z34.83 ENCOUNTER FOR SUPERVISION OF OTHER NORMAL PREGNANCY IN THIRD TRIMESTER: Primary | ICD-10-CM

## 2023-09-05 LAB
BILIRUB UR QL STRIP: NEGATIVE
CLARITY UR: CLEAR
COLOR UR: YELLOW
GLUCOSE UR STRIP-MCNC: NEGATIVE MG/DL
HGB UR QL STRIP.AUTO: NEGATIVE
KETONES UR QL STRIP: ABNORMAL
LEUKOCYTE ESTERASE UR QL STRIP.AUTO: NEGATIVE
NITRITE UR QL STRIP: NEGATIVE
PH UR STRIP.AUTO: 6 [PH] (ref 5–8)
PROT UR QL STRIP: NEGATIVE
SP GR UR STRIP: 1.02 (ref 1–1.03)
UROBILINOGEN UR QL STRIP: ABNORMAL

## 2023-09-05 PROCEDURE — 99284 EMERGENCY DEPT VISIT MOD MDM: CPT | Performed by: OBSTETRICS & GYNECOLOGY

## 2023-09-05 PROCEDURE — 81003 URINALYSIS AUTO W/O SCOPE: CPT | Performed by: OBSTETRICS & GYNECOLOGY

## 2023-09-05 PROCEDURE — 87086 URINE CULTURE/COLONY COUNT: CPT | Performed by: OBSTETRICS & GYNECOLOGY

## 2023-09-05 PROCEDURE — 59025 FETAL NON-STRESS TEST: CPT

## 2023-09-05 RX ORDER — ONDANSETRON 2 MG/ML
4 INJECTION INTRAMUSCULAR; INTRAVENOUS EVERY 6 HOURS PRN
Status: CANCELLED | OUTPATIENT
Start: 2023-09-05

## 2023-09-05 RX ORDER — ZOLPIDEM TARTRATE 5 MG/1
5 TABLET ORAL ONCE
Status: COMPLETED | OUTPATIENT
Start: 2023-09-05 | End: 2023-09-05

## 2023-09-05 RX ORDER — MAGNESIUM CARB/ALUMINUM HYDROX 105-160MG
30 TABLET,CHEWABLE ORAL ONCE
Status: CANCELLED | OUTPATIENT
Start: 2023-09-05 | End: 2023-09-05

## 2023-09-05 RX ORDER — CARBOPROST TROMETHAMINE 250 UG/ML
250 INJECTION, SOLUTION INTRAMUSCULAR
Status: CANCELLED | OUTPATIENT
Start: 2023-09-05

## 2023-09-05 RX ORDER — LIDOCAINE HYDROCHLORIDE 10 MG/ML
0.5 INJECTION, SOLUTION INFILTRATION; PERINEURAL ONCE AS NEEDED
Status: CANCELLED | OUTPATIENT
Start: 2023-09-05

## 2023-09-05 RX ORDER — SODIUM CHLORIDE 0.9 % (FLUSH) 0.9 %
10 SYRINGE (ML) INJECTION AS NEEDED
Status: CANCELLED | OUTPATIENT
Start: 2023-09-05

## 2023-09-05 RX ORDER — SODIUM CHLORIDE, SODIUM LACTATE, POTASSIUM CHLORIDE, CALCIUM CHLORIDE 600; 310; 30; 20 MG/100ML; MG/100ML; MG/100ML; MG/100ML
125 INJECTION, SOLUTION INTRAVENOUS CONTINUOUS
Status: CANCELLED | OUTPATIENT
Start: 2023-09-05

## 2023-09-05 RX ORDER — ONDANSETRON 4 MG/1
4 TABLET, FILM COATED ORAL EVERY 6 HOURS PRN
Status: CANCELLED | OUTPATIENT
Start: 2023-09-05

## 2023-09-05 RX ORDER — ACETAMINOPHEN 325 MG/1
650 TABLET ORAL EVERY 4 HOURS PRN
Status: CANCELLED | OUTPATIENT
Start: 2023-09-05

## 2023-09-05 RX ORDER — SODIUM CHLORIDE 9 MG/ML
40 INJECTION, SOLUTION INTRAVENOUS AS NEEDED
Status: CANCELLED | OUTPATIENT
Start: 2023-09-05

## 2023-09-05 RX ORDER — OXYTOCIN/0.9 % SODIUM CHLORIDE 30/500 ML
999 PLASTIC BAG, INJECTION (ML) INTRAVENOUS ONCE
Status: CANCELLED | OUTPATIENT
Start: 2023-09-05 | End: 2023-09-05

## 2023-09-05 RX ORDER — SODIUM CHLORIDE 0.9 % (FLUSH) 0.9 %
10 SYRINGE (ML) INJECTION EVERY 12 HOURS SCHEDULED
Status: CANCELLED | OUTPATIENT
Start: 2023-09-05

## 2023-09-05 RX ORDER — MISOPROSTOL 100 UG/1
800 TABLET ORAL ONCE AS NEEDED
Status: CANCELLED | OUTPATIENT
Start: 2023-09-05

## 2023-09-05 RX ORDER — METHYLERGONOVINE MALEATE 0.2 MG/ML
200 INJECTION INTRAVENOUS ONCE AS NEEDED
Status: CANCELLED | OUTPATIENT
Start: 2023-09-05

## 2023-09-05 RX ORDER — OXYTOCIN/0.9 % SODIUM CHLORIDE 30/500 ML
250 PLASTIC BAG, INJECTION (ML) INTRAVENOUS CONTINUOUS
Status: CANCELLED | OUTPATIENT
Start: 2023-09-05 | End: 2023-09-05

## 2023-09-05 RX ORDER — OXYTOCIN/0.9 % SODIUM CHLORIDE 30/500 ML
2-20 PLASTIC BAG, INJECTION (ML) INTRAVENOUS
Status: CANCELLED | OUTPATIENT
Start: 2023-09-05

## 2023-09-05 RX ADMIN — ZOLPIDEM TARTRATE 5 MG: 5 TABLET ORAL at 12:02

## 2023-09-05 NOTE — OBED NOTES
"[facility]  Terri Gunter  : 2000  MRN: [id number]  CSN: [id number]    OB ED Provider Note    Subjective   Chief Complaint   Patient presents with    Contractions     PAULO with complaints of CTX's every 5 minutes. She rates then an 8/10 on the pain scale. She denies LOF and VB     Terri Gunter is a 22 y.o. year old  with an Estimated Date of Delivery: 23 currently at 39w4d presenting with painful CTX every 5 min since last night. She denies ROM or VB. FM is present.  Her cervix was 2 cm in the office last week.    Prenatal care has been with Dr. Riojas.  It has been benign.    OB History    Para Term  AB Living   3 2 2 0 0 2   SAB IAB Ectopic Molar Multiple Live Births   0 0 0 0 0 2      # Outcome Date GA Lbr Brain/2nd Weight Sex Delivery Anes PTL Lv   3 Current            2 Term 19 39w4d 05:07 / 00:06 3407 g (7 lb 8.2 oz) M Vag-Spont EPI N DUSTY      Birth Comments: infant scale #1      Name: SILVESTRE GUNTER      Apgar1: 8  Apgar5: 9   1 Term 17 40w3d 21:50 / 01:35 3669 g (8 lb 1.4 oz) M Vag-Spont EPI N DUSTY      Birth Comments: Scale 4      Name: SILVESTRE GUNTER      Apgar1: 8  Apgar5: 9     Past Medical History:   Diagnosis Date    Multigravida in first trimester 2018    Asthma     states has misplaced inhaler, hasn't used inhaler since 2  mo pregnant    Chlamydia     Gonorrhea      Past Surgical History:   Procedure Laterality Date    WISDOM TOOTH EXTRACTION       No current facility-administered medications for this encounter.    No Known Allergies  Social History    Tobacco Use      Smoking status: Former      Smokeless tobacco: Never    Review of Systems   Gastrointestinal:  Positive for abdominal pain.   All other systems reviewed and are negative.      Objective   /74 (BP Location: Right arm, Patient Position: Lying)   Pulse 94   Temp 97.9 °F (36.6 °C) (Oral)   Resp 18   Ht 165.1 cm (65\")   Wt 113 kg (248 lb 6.4 oz)   LMP " 11/30/2022   SpO2 99%   BMI 41.34 kg/m²   General: well developed; well nourished  mildly distressed   Abdomen: soft, non-tender; no masses  gravid    FHT's: reactive and category 1      Cervix: was checked (by RN): 1.5 cm / 40 % / -3 unchanged after 2+ hours   Presentation: cephalic   Contractions: every 10-11 minutes   Chest: Unlabored respirations    CV:  RRR   Ext:   No C/C/E   Back: CVA tenderness is deferred bilateral        Prenatal Labs  Lab Results   Component Value Date    HGB 12.0 08/06/2023    RUBELLAABIGG 1.77 01/18/2023    HEPBSAG Negative 01/18/2023    ABORH O Positive 02/11/2019    ABSCRN Negative 01/18/2023    AOY2OFA2 Non Reactive 01/18/2023    HEPCVIRUSABY <0.1 01/18/2023     06/14/2023    AFL4CMKQ 92 06/13/2017    STREPGPB Negative 08/16/2023    URINECX No growth 08/07/2023    CHLAMNAA Negative 01/18/2023    NGONORRHON Negative 01/18/2023       Current Labs Reviewed   UA:    Lab Results   Component Value Date    SQUAMEPIUA 3-6 (A) 08/07/2023    SPECGRAVUR 1.024 09/05/2023    KETONESU 80 mg/dL (3+) (A) 09/05/2023    BLOODU Negative 09/05/2023    LEUKOCYTESUR Negative 09/05/2023    NITRITEU Negative 09/05/2023    RBCUA 3-5 (A) 08/07/2023    WBCUA 0-2 08/07/2023    BACTERIA None Seen 08/07/2023          Assessment   IUP at 39w4d  False labor > 37 weeks     Plan   Offered patient ambien to help her get some rest. She would like to try this option.  5 mg ambien prior to D/C. Return for worsening symptoms, acute changes. Keep regularly scheduled prenatal appointments.      Maite Garza MD  9/5/2023  09:56 EDT

## 2023-09-06 ENCOUNTER — HOSPITAL ENCOUNTER (EMERGENCY)
Dept: LABOR AND DELIVERY | Facility: HOSPITAL | Age: 23
Discharge: HOME | End: 2023-09-06
Payer: COMMERCIAL

## 2023-09-06 ENCOUNTER — ANESTHESIA (OUTPATIENT)
Dept: LABOR AND DELIVERY | Facility: HOSPITAL | Age: 23
End: 2023-09-06
Payer: COMMERCIAL

## 2023-09-06 ENCOUNTER — ANESTHESIA EVENT (OUTPATIENT)
Dept: LABOR AND DELIVERY | Facility: HOSPITAL | Age: 23
End: 2023-09-06
Payer: COMMERCIAL

## 2023-09-06 ENCOUNTER — HOSPITAL ENCOUNTER (INPATIENT)
Facility: HOSPITAL | Age: 23
LOS: 2 days | Discharge: HOME | End: 2023-09-08
Attending: OBSTETRICS & GYNECOLOGY | Admitting: OBSTETRICS & GYNECOLOGY
Payer: COMMERCIAL

## 2023-09-06 DIAGNOSIS — Z34.83 ENCOUNTER FOR SUPERVISION OF OTHER NORMAL PREGNANCY IN THIRD TRIMESTER: ICD-10-CM

## 2023-09-06 PROBLEM — Z34.90 TERM PREGNANCY: Status: ACTIVE | Noted: 2023-09-06

## 2023-09-06 LAB
ABO GROUP BLD: NORMAL
BACTERIA SPEC AEROBE CULT: NO GROWTH
BLD GP AB SCN SERPL QL: NEGATIVE
DEPRECATED RDW RBC AUTO: 47.7 FL (ref 37–54)
ERYTHROCYTE [DISTWIDTH] IN BLOOD BY AUTOMATED COUNT: 14.3 % (ref 12.3–15.4)
HBV SURFACE AG SERPL QL IA: NORMAL
HCT VFR BLD AUTO: 36.4 % (ref 34–46.6)
HCV AB SER DONR QL: NORMAL
HGB BLD-MCNC: 12.1 G/DL (ref 12–15.9)
HIV 1+2 AB+HIV1 P24 AG SERPL QL IA: NORMAL
MCH RBC QN AUTO: 30.3 PG (ref 26.6–33)
MCHC RBC AUTO-ENTMCNC: 33.2 G/DL (ref 31.5–35.7)
MCV RBC AUTO: 91 FL (ref 79–97)
PLATELET # BLD AUTO: 171 10*3/MM3 (ref 140–450)
PMV BLD AUTO: 10.6 FL (ref 6–12)
RBC # BLD AUTO: 4 10*6/MM3 (ref 3.77–5.28)
RH BLD: POSITIVE
T&S EXPIRATION DATE: NORMAL
WBC NRBC COR # BLD: 8.88 10*3/MM3 (ref 3.4–10.8)

## 2023-09-06 PROCEDURE — 59410 OBSTETRICAL CARE: CPT | Performed by: OBSTETRICS & GYNECOLOGY

## 2023-09-06 PROCEDURE — 25010000002 PHENYLEPHRINE 10 MG/ML SOLUTION: Performed by: ANESTHESIOLOGY

## 2023-09-06 PROCEDURE — C1755 CATHETER, INTRASPINAL: HCPCS

## 2023-09-06 PROCEDURE — 86901 BLOOD TYPING SEROLOGIC RH(D): CPT | Performed by: OBSTETRICS & GYNECOLOGY

## 2023-09-06 PROCEDURE — 85027 COMPLETE CBC AUTOMATED: CPT | Performed by: OBSTETRICS & GYNECOLOGY

## 2023-09-06 PROCEDURE — 87340 HEPATITIS B SURFACE AG IA: CPT | Performed by: ANESTHESIOLOGY

## 2023-09-06 PROCEDURE — 86803 HEPATITIS C AB TEST: CPT | Performed by: ANESTHESIOLOGY

## 2023-09-06 PROCEDURE — G0432 EIA HIV-1/HIV-2 SCREEN: HCPCS | Performed by: ANESTHESIOLOGY

## 2023-09-06 PROCEDURE — 10907ZC DRAINAGE OF AMNIOTIC FLUID, THERAPEUTIC FROM PRODUCTS OF CONCEPTION, VIA NATURAL OR ARTIFICIAL OPENING: ICD-10-PCS | Performed by: OBSTETRICS & GYNECOLOGY

## 2023-09-06 PROCEDURE — 86900 BLOOD TYPING SEROLOGIC ABO: CPT | Performed by: OBSTETRICS & GYNECOLOGY

## 2023-09-06 PROCEDURE — 86850 RBC ANTIBODY SCREEN: CPT | Performed by: OBSTETRICS & GYNECOLOGY

## 2023-09-06 PROCEDURE — C1755 CATHETER, INTRASPINAL: HCPCS | Performed by: ANESTHESIOLOGY

## 2023-09-06 PROCEDURE — 0UQMXZZ REPAIR VULVA, EXTERNAL APPROACH: ICD-10-PCS | Performed by: OBSTETRICS & GYNECOLOGY

## 2023-09-06 PROCEDURE — 99202 OFFICE O/P NEW SF 15 MIN: CPT | Performed by: OBSTETRICS & GYNECOLOGY

## 2023-09-06 PROCEDURE — 3E0E7GC INTRODUCTION OF OTHER THERAPEUTIC SUBSTANCE INTO PRODUCTS OF CONCEPTION, VIA NATURAL OR ARTIFICIAL OPENING: ICD-10-PCS | Performed by: OBSTETRICS & GYNECOLOGY

## 2023-09-06 RX ORDER — EPHEDRINE SULFATE 50 MG/ML
5 INJECTION, SOLUTION INTRAVENOUS
Status: DISCONTINUED | OUTPATIENT
Start: 2023-09-06 | End: 2023-09-06 | Stop reason: HOSPADM

## 2023-09-06 RX ORDER — CARBOPROST TROMETHAMINE 250 UG/ML
250 INJECTION, SOLUTION INTRAMUSCULAR
Status: DISCONTINUED | OUTPATIENT
Start: 2023-09-06 | End: 2023-09-06 | Stop reason: HOSPADM

## 2023-09-06 RX ORDER — SODIUM CHLORIDE 0.9 % (FLUSH) 0.9 %
10 SYRINGE (ML) INJECTION AS NEEDED
Status: DISCONTINUED | OUTPATIENT
Start: 2023-09-06 | End: 2023-09-06 | Stop reason: HOSPADM

## 2023-09-06 RX ORDER — FENTANYL CIT 0.2 MG/100ML-ROPIV 0.2%-NACL 0.9% EPIDURAL INJ 2/0.2 MCG/ML-%
10 SOLUTION INJECTION CONTINUOUS
Status: DISCONTINUED | OUTPATIENT
Start: 2023-09-06 | End: 2023-09-07

## 2023-09-06 RX ORDER — TRANEXAMIC ACID 10 MG/ML
1000 INJECTION, SOLUTION INTRAVENOUS ONCE
Status: DISCONTINUED | OUTPATIENT
Start: 2023-09-06 | End: 2023-09-06

## 2023-09-06 RX ORDER — PHENYLEPHRINE HYDROCHLORIDE 10 MG/ML
INJECTION INTRAVENOUS AS NEEDED
Status: DISCONTINUED | OUTPATIENT
Start: 2023-09-06 | End: 2023-09-06 | Stop reason: SURG

## 2023-09-06 RX ORDER — ERYTHROMYCIN 5 MG/G
OINTMENT OPHTHALMIC
Status: ACTIVE
Start: 2023-09-06 | End: 2023-09-07

## 2023-09-06 RX ORDER — LIDOCAINE HYDROCHLORIDE 10 MG/ML
0.5 INJECTION, SOLUTION INFILTRATION; PERINEURAL ONCE AS NEEDED
Status: DISCONTINUED | OUTPATIENT
Start: 2023-09-06 | End: 2023-09-06 | Stop reason: HOSPADM

## 2023-09-06 RX ORDER — OXYTOCIN/0.9 % SODIUM CHLORIDE 30/500 ML
999 PLASTIC BAG, INJECTION (ML) INTRAVENOUS ONCE
Status: DISCONTINUED | OUTPATIENT
Start: 2023-09-06 | End: 2023-09-06 | Stop reason: HOSPADM

## 2023-09-06 RX ORDER — LIDOCAINE HYDROCHLORIDE 10 MG/ML
INJECTION, SOLUTION EPIDURAL; INFILTRATION; INTRACAUDAL; PERINEURAL AS NEEDED
Status: DISCONTINUED | OUTPATIENT
Start: 2023-09-06 | End: 2023-09-06 | Stop reason: SURG

## 2023-09-06 RX ORDER — SODIUM CHLORIDE 0.9 % (FLUSH) 0.9 %
10 SYRINGE (ML) INJECTION EVERY 12 HOURS SCHEDULED
Status: DISCONTINUED | OUTPATIENT
Start: 2023-09-06 | End: 2023-09-06 | Stop reason: HOSPADM

## 2023-09-06 RX ORDER — ONDANSETRON 2 MG/ML
4 INJECTION INTRAMUSCULAR; INTRAVENOUS ONCE AS NEEDED
Status: DISCONTINUED | OUTPATIENT
Start: 2023-09-06 | End: 2023-09-06 | Stop reason: HOSPADM

## 2023-09-06 RX ORDER — ONDANSETRON 2 MG/ML
4 INJECTION INTRAMUSCULAR; INTRAVENOUS EVERY 6 HOURS PRN
Status: DISCONTINUED | OUTPATIENT
Start: 2023-09-06 | End: 2023-09-06 | Stop reason: HOSPADM

## 2023-09-06 RX ORDER — OXYTOCIN/0.9 % SODIUM CHLORIDE 30/500 ML
250 PLASTIC BAG, INJECTION (ML) INTRAVENOUS CONTINUOUS
Status: ACTIVE | OUTPATIENT
Start: 2023-09-06 | End: 2023-09-06

## 2023-09-06 RX ORDER — OXYTOCIN/0.9 % SODIUM CHLORIDE 30/500 ML
125 PLASTIC BAG, INJECTION (ML) INTRAVENOUS CONTINUOUS PRN
Status: COMPLETED | OUTPATIENT
Start: 2023-09-06 | End: 2023-09-06

## 2023-09-06 RX ORDER — ACETAMINOPHEN 325 MG/1
650 TABLET ORAL EVERY 4 HOURS PRN
Status: DISCONTINUED | OUTPATIENT
Start: 2023-09-06 | End: 2023-09-06 | Stop reason: HOSPADM

## 2023-09-06 RX ORDER — MISOPROSTOL 200 UG/1
800 TABLET ORAL ONCE AS NEEDED
Status: DISCONTINUED | OUTPATIENT
Start: 2023-09-06 | End: 2023-09-06 | Stop reason: HOSPADM

## 2023-09-06 RX ORDER — ONDANSETRON 4 MG/1
4 TABLET, FILM COATED ORAL EVERY 6 HOURS PRN
Status: DISCONTINUED | OUTPATIENT
Start: 2023-09-06 | End: 2023-09-06 | Stop reason: HOSPADM

## 2023-09-06 RX ORDER — PHYTONADIONE 1 MG/.5ML
INJECTION, EMULSION INTRAMUSCULAR; INTRAVENOUS; SUBCUTANEOUS
Status: ACTIVE
Start: 2023-09-06 | End: 2023-09-07

## 2023-09-06 RX ORDER — DIPHENHYDRAMINE HYDROCHLORIDE 50 MG/ML
12.5 INJECTION INTRAMUSCULAR; INTRAVENOUS EVERY 8 HOURS PRN
Status: DISCONTINUED | OUTPATIENT
Start: 2023-09-06 | End: 2023-09-06 | Stop reason: HOSPADM

## 2023-09-06 RX ORDER — OXYTOCIN/0.9 % SODIUM CHLORIDE 30/500 ML
2-20 PLASTIC BAG, INJECTION (ML) INTRAVENOUS
Status: DISCONTINUED | OUTPATIENT
Start: 2023-09-06 | End: 2023-09-06 | Stop reason: HOSPADM

## 2023-09-06 RX ORDER — METHYLERGONOVINE MALEATE 0.2 MG/ML
200 INJECTION INTRAVENOUS ONCE AS NEEDED
Status: DISCONTINUED | OUTPATIENT
Start: 2023-09-06 | End: 2023-09-06 | Stop reason: HOSPADM

## 2023-09-06 RX ORDER — FAMOTIDINE 10 MG/ML
20 INJECTION, SOLUTION INTRAVENOUS ONCE AS NEEDED
Status: DISCONTINUED | OUTPATIENT
Start: 2023-09-06 | End: 2023-09-06 | Stop reason: HOSPADM

## 2023-09-06 RX ORDER — MAGNESIUM CARB/ALUMINUM HYDROX 105-160MG
30 TABLET,CHEWABLE ORAL ONCE
Status: DISCONTINUED | OUTPATIENT
Start: 2023-09-06 | End: 2023-09-06 | Stop reason: HOSPADM

## 2023-09-06 RX ORDER — SODIUM CHLORIDE 9 MG/ML
40 INJECTION, SOLUTION INTRAVENOUS AS NEEDED
Status: DISCONTINUED | OUTPATIENT
Start: 2023-09-06 | End: 2023-09-06 | Stop reason: HOSPADM

## 2023-09-06 RX ORDER — SODIUM CHLORIDE, SODIUM LACTATE, POTASSIUM CHLORIDE, CALCIUM CHLORIDE 600; 310; 30; 20 MG/100ML; MG/100ML; MG/100ML; MG/100ML
125 INJECTION, SOLUTION INTRAVENOUS CONTINUOUS
Status: DISCONTINUED | OUTPATIENT
Start: 2023-09-06 | End: 2023-09-07

## 2023-09-06 RX ORDER — IBUPROFEN 600 MG/1
600 TABLET ORAL EVERY 6 HOURS PRN
Status: DISCONTINUED | OUTPATIENT
Start: 2023-09-06 | End: 2023-09-08 | Stop reason: HOSPADM

## 2023-09-06 RX ADMIN — Medication 1 MILLI-UNITS/MIN: at 12:23

## 2023-09-06 RX ADMIN — Medication 125 ML/HR: at 22:19

## 2023-09-06 RX ADMIN — SODIUM CHLORIDE, POTASSIUM CHLORIDE, SODIUM LACTATE AND CALCIUM CHLORIDE 125 ML/HR: 600; 310; 30; 20 INJECTION, SOLUTION INTRAVENOUS at 11:49

## 2023-09-06 RX ADMIN — Medication 10 ML/HR: at 16:35

## 2023-09-06 RX ADMIN — SODIUM CHLORIDE 250 ML: 9 INJECTION, SOLUTION INTRAVENOUS at 09:28

## 2023-09-06 RX ADMIN — Medication 10 ML/HR: at 07:23

## 2023-09-06 RX ADMIN — SODIUM CHLORIDE 300 ML: 9 INJECTION, SOLUTION INTRAVENOUS at 10:59

## 2023-09-06 RX ADMIN — LIDOCAINE HYDROCHLORIDE 5 ML: 10 INJECTION, SOLUTION EPIDURAL; INFILTRATION; INTRACAUDAL; PERINEURAL at 06:59

## 2023-09-06 RX ADMIN — LIDOCAINE HYDROCHLORIDE 5 ML: 10 INJECTION, SOLUTION EPIDURAL; INFILTRATION; INTRACAUDAL; PERINEURAL at 07:01

## 2023-09-06 RX ADMIN — SODIUM CHLORIDE, POTASSIUM CHLORIDE, SODIUM LACTATE AND CALCIUM CHLORIDE 125 ML/HR: 600; 310; 30; 20 INJECTION, SOLUTION INTRAVENOUS at 06:46

## 2023-09-06 RX ADMIN — SODIUM CHLORIDE, POTASSIUM CHLORIDE, SODIUM LACTATE AND CALCIUM CHLORIDE 1000 ML: 600; 310; 30; 20 INJECTION, SOLUTION INTRAVENOUS at 05:50

## 2023-09-06 RX ADMIN — IBUPROFEN 600 MG: 600 TABLET ORAL at 22:29

## 2023-09-06 RX ADMIN — SODIUM CHLORIDE, POTASSIUM CHLORIDE, SODIUM LACTATE AND CALCIUM CHLORIDE 125 ML/HR: 600; 310; 30; 20 INJECTION, SOLUTION INTRAVENOUS at 20:00

## 2023-09-06 RX ADMIN — PHENYLEPHRINE HYDROCHLORIDE 100 MCG: 10 INJECTION INTRAVENOUS at 08:24

## 2023-09-06 NOTE — OBED NOTES
"[facility]  Terri Gunter  : 2000  MRN: [id number]  CSN: [id number]    OB ED Provider Note    Subjective   Chief Complaint   Patient presents with    Contractions     Pt comes into the [facility] c/o of ctx that got stronger around midnight. + fm noted per pt denies any lof or vag bleeding      Terri Gunter is a 22 y.o. year old  with an Estimated Date of Delivery: 23 currently at 39w5d presenting with ongoing CTX every 5 min, increasing in intensity throughout the evening after being seen in the [facility] yesterday. She denies ROM or VB.  FM is present.    Prenatal care has been with Dr. Riojas.  It has been benign.    OB History    Para Term  AB Living   3 2 2 0 0 2   SAB IAB Ectopic Molar Multiple Live Births   0 0 0 0 0 2      # Outcome Date GA Lbr Brain/2nd Weight Sex Delivery Anes PTL Lv   3 Current            2 Term 19 39w4d 05:07 / 00:06 3407 g (7 lb 8.2 oz) M Vag-Spont EPI N DUSTY      Birth Comments: infant scale #1      Name: SILVESTRE GUNTER      Apgar1: 8  Apgar5: 9   1 Term 17 40w3d 21:50 / 01:35 3669 g (8 lb 1.4 oz) M Vag-Spont EPI N DUSTY      Birth Comments: Scale 4      Name: SILVESTRE GUNTER      Apgar1: 8  Apgar5: 9     Past Medical History:   Diagnosis Date    Multigravida in first trimester 2018    Asthma     states has misplaced inhaler, hasn't used inhaler since 2  mo pregnant    Chlamydia     Gonorrhea      Past Surgical History:   Procedure Laterality Date    WISDOM TOOTH EXTRACTION       No current facility-administered medications for this encounter.    No Known Allergies  Social History    Tobacco Use      Smoking status: Former      Smokeless tobacco: Never    Review of Systems   Gastrointestinal:  Positive for abdominal pain.   All other systems reviewed and are negative.      Objective   /65   Pulse 83   Temp 98.1 °F (36.7 °C)   Resp 16   Ht 165.1 cm (65\")   Wt 112 kg (248 lb)   LMP 2022   SpO2 98%   BMI " 41.27 kg/m²   General: well developed; well nourished  mildly distressed   Abdomen: soft, non-tender; no masses  gravid    FHT's: Previously cat I, now cat 2 after prolonged variable  decel. 3 min, with marcelo 80s      Cervix: was checked (by RN): 1.5 cm / 50 % / -3   Presentation: cephalic   Contractions: every 6-7 minutes   Chest: Unlabored respirations    CV:  RRR   Ext:   No C/C/E   Back: CVA tenderness is deferred bilateral        Prenatal Labs  Lab Results   Component Value Date    HGB 12.0 08/06/2023    RUBELLAABIGG 1.77 01/18/2023    HEPBSAG Negative 01/18/2023    ABORH O Positive 02/11/2019    ABSCRN Negative 01/18/2023    OJB8HSY6 Non Reactive 01/18/2023    HEPCVIRUSABY <0.1 01/18/2023     06/14/2023    KYB4YASA 92 06/13/2017    STREPGPB Negative 08/16/2023    URINECX No growth 08/07/2023    CHLAMNAA Negative 01/18/2023    NGONORRHON Negative 01/18/2023       Current Labs Reviewed   UA:    Lab Results   Component Value Date    SQUAMEPIUA 3-6 (A) 08/07/2023    SPECGRAVUR 1.024 09/05/2023    KETONESU 80 mg/dL (3+) (A) 09/05/2023    BLOODU Negative 09/05/2023    LEUKOCYTESUR Negative 09/05/2023    NITRITEU Negative 09/05/2023    RBCUA 3-5 (A) 08/07/2023    WBCUA 0-2 08/07/2023    BACTERIA None Seen 08/07/2023          Assessment   IUP at 39w5d  Latent labor  Cat 2 NST     Plan   Admit to L&D for cat 2 NST. Will hydrate, monitor closely. She is ok for an epidural    Maite Garza MD  9/6/2023  04:51 EDT

## 2023-09-06 NOTE — H&P
Patient is a 22 y.o. female admitted at 39 weeks for intermittent decelerations but with good LTV and accelerations.  In addition, she was scheduled for elective induction.    Patient Active Problem List   Diagnosis    Term pregnancy     Prenatal care is uncomplicated    Past Medical History:   Diagnosis Date    Asthma     states has misplaced inhaler, hasn't used inhaler since 2  mo pregnant    Chlamydia     Gonorrhea     Multigravida in first trimester 12/11/2018       Past Surgical History:   Procedure Laterality Date    WISDOM TOOTH EXTRACTION         No Known Allergies    Social History     Socioeconomic History    Marital status: Single   Tobacco Use    Smoking status: Former    Smokeless tobacco: Never   Vaping Use    Vaping Use: Never used   Substance and Sexual Activity    Alcohol use: No    Drug use: No    Sexual activity: Yes     Partners: Male     Birth control/protection: None       Physical Exam  Gen: Alert and pleasant.  Vitals:    09/06/23 0917   BP:    Pulse:    Resp: 18   Temp: 97.6 °F (36.4 °C)   SpO2:      HEENT: WNL  Abdomen: Gravid.  EFW 6.5 lbs  Cervix:  50/3-4/-2.  AROM with light meconium.  IUPC placed.  Pelvis adequate.  FHT: Reactive.    Assessment/Plan: IUP at 39 weeks  - Amnioinfusion ordered.  - Pitocin if no cervical change.  - Plan discussed.    Ry Schultz MD

## 2023-09-06 NOTE — ANESTHESIA PREPROCEDURE EVALUATION
Anesthesia Evaluation     Patient summary reviewed and Nursing notes reviewed                Airway   Mallampati: II  TM distance: >3 FB  Neck ROM: full  Dental - normal exam     Pulmonary - normal exam    breath sounds clear to auscultation  (+) asthma,  Cardiovascular - negative cardio ROS and normal exam    Rhythm: regular  Rate: normal    (-) angina, orthopnea, PND, VALDIVIA      Neuro/Psych- negative ROS  GI/Hepatic/Renal/Endo    (+) obesity    Musculoskeletal (-) negative ROS    Abdominal    Substance History - negative use     OB/GYN    (+) Pregnant (39w5d)        Other - negative ROS                       Anesthesia Plan    ASA 2     epidural       Anesthetic plan, risks, benefits, and alternatives have been provided, discussed and informed consent has been obtained with: patient.      CODE STATUS:

## 2023-09-06 NOTE — ANESTHESIA PROCEDURE NOTES
Labor Epidural      Patient reassessed immediately prior to procedure    Patient location during procedure: OB  Start Time: 9/6/2023 6:39 AM  Stop Time: 9/6/2023 6:59 AM  Performed By  Anesthesiologist: Rick Kimbrough MD  Preanesthetic Checklist  Completed: patient identified, site marked, risks and benefits discussed, surgical consent, monitors and equipment checked, pre-op evaluation and timeout performed  Additional Notes  I was inadvertently stuck with a dirty #22 G needle during placement of the epidural which has been reported.   Prep:  Pt Position:sitting  Sterile Tech:cap, gloves, mask and sterile barrier  Prep:chlorhexidine gluconate and isopropyl alcohol  Monitoring:blood pressure monitoring, continuous pulse oximetry and EKG  Epidural Block Procedure:  Approach:midline  Guidance:landmark technique  Location:L3-L4  Needle Type:Tuohy  Needle Gauge:17  Loss of Resistance Medium: saline  Paresthesia: none  Aspiration:negative  Test Dose:negative  Number of Attempts: 1  Post Assessment:  Dressing:occlusive dressing applied and secured with tape  Pt Tolerance:patient tolerated the procedure well with no apparent complications  Complications:no

## 2023-09-07 LAB
BASOPHILS # BLD AUTO: 0.03 10*3/MM3 (ref 0–0.2)
BASOPHILS NFR BLD AUTO: 0.2 % (ref 0–1.5)
DEPRECATED RDW RBC AUTO: 47.7 FL (ref 37–54)
EOSINOPHIL # BLD AUTO: 0.06 10*3/MM3 (ref 0–0.4)
EOSINOPHIL NFR BLD AUTO: 0.5 % (ref 0.3–6.2)
ERYTHROCYTE [DISTWIDTH] IN BLOOD BY AUTOMATED COUNT: 14.1 % (ref 12.3–15.4)
HCT VFR BLD AUTO: 34.3 % (ref 34–46.6)
HGB BLD-MCNC: 11.3 G/DL (ref 12–15.9)
IMM GRANULOCYTES # BLD AUTO: 0.07 10*3/MM3 (ref 0–0.05)
IMM GRANULOCYTES NFR BLD AUTO: 0.5 % (ref 0–0.5)
LYMPHOCYTES # BLD AUTO: 1.33 10*3/MM3 (ref 0.7–3.1)
LYMPHOCYTES NFR BLD AUTO: 10 % (ref 19.6–45.3)
MCH RBC QN AUTO: 30.7 PG (ref 26.6–33)
MCHC RBC AUTO-ENTMCNC: 32.9 G/DL (ref 31.5–35.7)
MCV RBC AUTO: 93.2 FL (ref 79–97)
MONOCYTES # BLD AUTO: 0.95 10*3/MM3 (ref 0.1–0.9)
MONOCYTES NFR BLD AUTO: 7.1 % (ref 5–12)
NEUTROPHILS NFR BLD AUTO: 10.86 10*3/MM3 (ref 1.7–7)
NEUTROPHILS NFR BLD AUTO: 81.7 % (ref 42.7–76)
NRBC BLD AUTO-RTO: 0 /100 WBC (ref 0–0.2)
PLATELET # BLD AUTO: 153 10*3/MM3 (ref 140–450)
PMV BLD AUTO: 11.3 FL (ref 6–12)
RBC # BLD AUTO: 3.68 10*6/MM3 (ref 3.77–5.28)
WBC NRBC COR # BLD: 13.3 10*3/MM3 (ref 3.4–10.8)

## 2023-09-07 PROCEDURE — 85025 COMPLETE CBC W/AUTO DIFF WBC: CPT | Performed by: OBSTETRICS & GYNECOLOGY

## 2023-09-07 PROCEDURE — 0503F POSTPARTUM CARE VISIT: CPT | Performed by: OBSTETRICS & GYNECOLOGY

## 2023-09-07 RX ORDER — HYDROCODONE BITARTRATE AND ACETAMINOPHEN 10; 325 MG/1; MG/1
1 TABLET ORAL EVERY 4 HOURS PRN
Status: DISCONTINUED | OUTPATIENT
Start: 2023-09-07 | End: 2023-09-08 | Stop reason: HOSPADM

## 2023-09-07 RX ORDER — SODIUM CHLORIDE 0.9 % (FLUSH) 0.9 %
1-10 SYRINGE (ML) INJECTION AS NEEDED
Status: DISCONTINUED | OUTPATIENT
Start: 2023-09-07 | End: 2023-09-08 | Stop reason: HOSPADM

## 2023-09-07 RX ORDER — PRENATAL VIT/IRON FUM/FOLIC AC 27MG-0.8MG
1 TABLET ORAL DAILY
Status: DISCONTINUED | OUTPATIENT
Start: 2023-09-07 | End: 2023-09-08 | Stop reason: HOSPADM

## 2023-09-07 RX ORDER — DOCUSATE SODIUM 100 MG/1
100 CAPSULE, LIQUID FILLED ORAL 2 TIMES DAILY
Status: DISCONTINUED | OUTPATIENT
Start: 2023-09-07 | End: 2023-09-08 | Stop reason: HOSPADM

## 2023-09-07 RX ORDER — ONDANSETRON 2 MG/ML
4 INJECTION INTRAMUSCULAR; INTRAVENOUS EVERY 6 HOURS PRN
Status: DISCONTINUED | OUTPATIENT
Start: 2023-09-07 | End: 2023-09-08 | Stop reason: HOSPADM

## 2023-09-07 RX ORDER — HYDROCODONE BITARTRATE AND ACETAMINOPHEN 5; 325 MG/1; MG/1
1 TABLET ORAL EVERY 4 HOURS PRN
Status: DISCONTINUED | OUTPATIENT
Start: 2023-09-07 | End: 2023-09-08 | Stop reason: HOSPADM

## 2023-09-07 RX ORDER — ACETAMINOPHEN 325 MG/1
650 TABLET ORAL EVERY 6 HOURS PRN
Status: DISCONTINUED | OUTPATIENT
Start: 2023-09-07 | End: 2023-09-08 | Stop reason: HOSPADM

## 2023-09-07 RX ORDER — BISACODYL 10 MG
10 SUPPOSITORY, RECTAL RECTAL DAILY PRN
Status: DISCONTINUED | OUTPATIENT
Start: 2023-09-07 | End: 2023-09-08 | Stop reason: HOSPADM

## 2023-09-07 RX ORDER — ONDANSETRON 4 MG/1
4 TABLET, FILM COATED ORAL EVERY 6 HOURS PRN
Status: DISCONTINUED | OUTPATIENT
Start: 2023-09-07 | End: 2023-09-08 | Stop reason: HOSPADM

## 2023-09-07 RX ORDER — HYDROCORTISONE 25 MG/G
CREAM TOPICAL AS NEEDED
Status: DISCONTINUED | OUTPATIENT
Start: 2023-09-07 | End: 2023-09-08 | Stop reason: HOSPADM

## 2023-09-07 RX ORDER — PROMETHAZINE HYDROCHLORIDE 25 MG/1
25 TABLET ORAL EVERY 6 HOURS PRN
Status: DISCONTINUED | OUTPATIENT
Start: 2023-09-07 | End: 2023-09-08 | Stop reason: HOSPADM

## 2023-09-07 RX ORDER — PROMETHAZINE HYDROCHLORIDE 12.5 MG/1
12.5 SUPPOSITORY RECTAL EVERY 6 HOURS PRN
Status: DISCONTINUED | OUTPATIENT
Start: 2023-09-07 | End: 2023-09-08 | Stop reason: HOSPADM

## 2023-09-07 RX ADMIN — HYDROCODONE BITARTRATE AND ACETAMINOPHEN 1 TABLET: 5; 325 TABLET ORAL at 10:36

## 2023-09-07 RX ADMIN — DOCUSATE SODIUM 100 MG: 100 CAPSULE, LIQUID FILLED ORAL at 08:03

## 2023-09-07 RX ADMIN — IBUPROFEN 600 MG: 600 TABLET ORAL at 08:02

## 2023-09-07 RX ADMIN — DOCUSATE SODIUM 100 MG: 100 CAPSULE, LIQUID FILLED ORAL at 20:25

## 2023-09-07 RX ADMIN — HYDROCODONE BITARTRATE AND ACETAMINOPHEN 1 TABLET: 5; 325 TABLET ORAL at 20:25

## 2023-09-07 RX ADMIN — HYDROCODONE BITARTRATE AND ACETAMINOPHEN 1 TABLET: 5; 325 TABLET ORAL at 00:44

## 2023-09-07 RX ADMIN — Medication: at 00:45

## 2023-09-07 RX ADMIN — IBUPROFEN 600 MG: 600 TABLET ORAL at 20:25

## 2023-09-07 RX ADMIN — Medication 1 TABLET: at 08:02

## 2023-09-07 NOTE — L&D DELIVERY NOTE
[facility]   Vaginal Delivery Note    Patient Name: Terri Gunter  : 2000  MRN: [id number]    Date of Delivery: 2023     Diagnosis     Pre & Post-Delivery:  Intrauterine pregnancy at 39w5d  Labor status: Spontaneous Onset of Labor      (normal spontaneous vaginal delivery)    Term pregnancy             Problem List    Transfer to Postpartum     Review the Delivery Report for details.     Delivery     Delivery: Vaginal, Spontaneous     YOB: 2025    Time of Birth:  Gestational Age 9:02 PM   39w5d     Anesthesia: Epidural     Delivering clinician: Emili Riojas    Forceps?   No   Vacuum? No    Shoulder dystocia present: No        Delivery narrative: Patient is a 22-year-old -0-0-2 who was admitted at 39 weeks and 5 days after presenting for rule out labor and was noted to have a category 2 tracing.  She was scheduled that day for elective induction of labor and her pregnancy had been uncomplicated.  She did make change from 1 to 2 cm to 3 to 4 cm and received an epidural.  Assisted rupture of membranes was performed with return of meconium stained fluid and IUPC was placed.  She overall had a reassuring tracing with intermittent periods of category 2 tracing and Pitocin was started.  She did receive an amnioinfusion during the induction.  She progressed along a normal multiparous labor curve to complete dilation and +2 station.    With 1 contraction, she pushed and delivered a liveborn male infant in the occiput anterior position over an intact perineum.  There was a nuchal cord x1 that was reduced.  With gentle posterior guidance of the fetal head and maternal pushing, the right anterior shoulder was easily delivered, followed by the remainder of the body.  The infant was immediately vigorous and placed on mom's chest.  Delayed cord clamping was performed for 30 seconds and cord was clamped and cut by the father.  Cord blood was collected and sent.  The placenta then  delivered spontaneously and was intact with a three-vessel cord.  Bimanual exam revealed a firm uterus with no remaining products of conception.  Inspection of her perineum revealed a small periurethral laceration that was repaired with 3-0 Vicryl in a running fashion.  Perineum was hemostatic.  She tolerated the procedure well.  All counts were correct at the end of the procedure.      Infant     Findings: male  infant     Infant observations: Weight: 3742 g (8 lb 4 oz)   Length: 21  in  Observations/Comments:  Paul LR 6      Apgars: 6  @ 1 minute /    9  @ 5 minutes   Infant Name:      Placenta & Cord         Placenta delivered  Spontaneous  at   9/6/2023  9:05 PM     Cord: 3 vessels  present.   Nuchal Cord?  yes; Number of nuchal loops present:  1    Cord blood obtained: Yes    Cord gases obtained:  No    Cord gas results: Venous:  No results found for: PHCVEN    Arterial:  No results found for: PHCART     Repair     Episiotomy: None     No    Lacerations: Yes  Laceration Information  Laceration Repaired?   Perineal: None      Periurethral:   Yes    Labial:       Sulcus:       Vaginal:       Cervical:         Suture used for repair: 3-0 Vicryl   Estimated Blood Loss:       Quantitative Blood Loss: Quantitative Blood Loss (mL): 140 mL (09/06/23 2106)        Complications     none    Disposition     Mother to Mother Baby/Postpartum  in stable condition currently.  Baby to remains with mom  in stable condition currently.    Emili Riojas MD  09/06/23  21:50 EDT

## 2023-09-07 NOTE — ANESTHESIA POSTPROCEDURE EVALUATION
"Patient: Terri Gunter    Procedure Summary       Date: 09/06/23 Room / Location:     Anesthesia Start: 0639 Anesthesia Stop: 2102    Procedure: LABOR ANALGESIA Diagnosis:     Scheduled Providers:  Provider: You Saez MD    Anesthesia Type: epidural ASA Status: 2            Anesthesia Type: epidural    Vitals  Vitals Value Taken Time   /72 09/06/23 2216   Temp 37.1 °C (98.8 °F) 09/06/23 2200   Pulse 98 09/06/23 2216   Resp 16 09/06/23 2200   SpO2 98 % 09/06/23 2146   Vitals shown include unvalidated device data.        Post Anesthesia Care and Evaluation    Patient location during evaluation: PACU  Patient participation: complete - patient participated  Level of consciousness: awake and alert  Pain management: adequate    Airway patency: patent  Anesthetic complications: No anesthetic complications  PONV Status: controlled  Cardiovascular status: acceptable and hemodynamically stable  Respiratory status: acceptable  Hydration status: acceptable    Comments: /72   Pulse 97   Temp 37.1 °C (98.8 °F)   Resp 16   Ht 165.1 cm (65\")   Wt 113 kg (249 lb)   LMP 11/30/2022   SpO2 98%   Breastfeeding Yes   BMI 41.44 kg/m²     "

## 2023-09-07 NOTE — PLAN OF CARE
Goal Outcome Evaluation:      VSS. Assessment WDL. Pain is well controlled with motrin and norco. Pt up ad dmitry. Breastfeeding going well. Will continue to monitor and assess.

## 2023-09-07 NOTE — PROGRESS NOTES
Postpartum Progress Note      Status post Vaginal Delivery: Doing well postoperatively.     1) postpartum care immediately following delivery : Doing well, routine care - Doing well, anticipate discharge home  2) Obesity, but s/p  - Ambulation encouraged for VTE PPx       Rh status: O positive   Rubella: immune  Gender: Male    Desires circumcision   R/B/A reviewed   Voiced understanding  Wishes to proceed     Subjective     Postpartum Day 1: Vaginal delivery    The patient feels well. The patient denies emotional concerns. Pain is well controlled with current medications. The baby is well. The patient is ambulating well. The patient is tolerating a normal diet.     Objective     Vital signs in last 24 hours:  Temp:  [97.6 °F (36.4 °C)-100.3 °F (37.9 °C)] 98.6 °F (37 °C)  Heart Rate:  [] 84  Resp:  [15-18] 16  BP: ()/(29-84) 116/72      General:    alert, appears stated age, and cooperative   Abdomen:  Soft, Non-tender    Lochia:  appropriate   Uterine Fundus:   firm   Ext    Edema 1+   DVT Evaluation:  No evidence of DVT seen on physical exam.     AM CBC pending still     Jose Camp MD  2023  08:15 EDT

## 2023-09-07 NOTE — LACTATION NOTE
Lactation Consult Note  P3, T baby, admitted early AM. Mom was not successful BF her other children. Mother called for help with BF. Reported baby has been very sleepy.  Assisted mother in waking up the baby and in latching him in a football position to the right breast. Educated her starting nose to nipple to obtain deep latch and baby was able to achieve it immediately. He is latching well, has nutritive suckle, and has a good jaw rotation, but is falling asleep easily. Discussed ways to keep baby awake during BF. Encouraged mom to try to BF every 2-3 hours for 15 min. on each side. Educated on importance of deep latching, hand expression, how to know if baby is getting enough, different ways to rouse infant and burping him. Mom is able easily to express colostrum. PT reports that she already has PBP. She declines any questions and concerns at this time. Encouraged to call   LC if needing further assistance.        Evaluation Completed: 2023 14:05 EDT  Patient Name: Terri Gunter  :  2000  MRN:  [id number]     REFERRAL  INFORMATION:                          Date of Referral: 23   Person Making Referral: patient  Maternal Reason for Referral: breastfeeding currently  Infant Reason for Referral: sleepy    DELIVERY HISTORY:        Skin to skin initiation date/time: 2023  9:25 PM   Skin to skin end date/time:           MATERNAL ASSESSMENT:  Breast Size Issue: none (23 132)  Breast Shape: Bilateral:, pendulous (23 132)  Breast Density: Bilateral:, soft (23 132)  Areola: Bilateral:, elastic (23 132)  Nipples: Bilateral:, short, graspable (23 132)                INFANT ASSESSMENT:  Information for the patient's :  Frances Gunter [[id number]]   No past medical history on file.   Feeding Readiness Cues: sleeping (23 132)                     Feeding Interventions: arousal required, jaw supported, latch assistance provided, lips stroked,  sucking promoted (23)               Breastfeeding: breastfeeding, right side only (23)   Infant Positioning: clutch/football (23)         Effective Latch During Feeding: yes (23)   Suck/Swallow/Breathing Coordination: present (23)   Signs of Milk Transfer: deep jaw excursions noted (23)       Latch: 2-->grasps breast, tongue down, lips flanged, rhythmic sucking (23)   Audible Swallowin-->a few with stimulation (23)   Type of Nipple: 2-->everted (after stimulation) (23)   Comfort (Breast/Nipple): 2-->soft/nontender (23)   Hold (Positioning): 1-->minimal assist, teach one side, mother does other, staff holds (23)   Latch Score: 8 (23)                    MATERNAL INFANT FEEDING:     Maternal Emotional State: receptive, relaxed (23)  Infant Positioning: clutch/football (23)   Signs of Milk Transfer: deep jaw excursions noted (23)  Pain with Feeding: no (23)           Milk Ejection Reflex: present (23)           Latch Assistance: minimal assistance (23)                               EQUIPMENT TYPE:                                 BREAST PUMPING:          LACTATION REFERRALS:

## 2023-09-08 VITALS
HEIGHT: 65 IN | HEART RATE: 72 BPM | WEIGHT: 249 LBS | OXYGEN SATURATION: 99 % | BODY MASS INDEX: 41.48 KG/M2 | RESPIRATION RATE: 16 BRPM | DIASTOLIC BLOOD PRESSURE: 63 MMHG | TEMPERATURE: 97.9 F | SYSTOLIC BLOOD PRESSURE: 124 MMHG

## 2023-09-08 PROCEDURE — 0503F POSTPARTUM CARE VISIT: CPT | Performed by: NURSE PRACTITIONER

## 2023-09-08 RX ORDER — PSEUDOEPHEDRINE HCL 30 MG
100 TABLET ORAL 2 TIMES DAILY
Qty: 30 CAPSULE | Refills: 0 | Status: SHIPPED | OUTPATIENT
Start: 2023-09-08

## 2023-09-08 RX ORDER — HYDROCODONE BITARTRATE AND ACETAMINOPHEN 5; 325 MG/1; MG/1
1 TABLET ORAL EVERY 6 HOURS PRN
Qty: 10 TABLET | Refills: 0 | Status: SHIPPED | OUTPATIENT
Start: 2023-09-08 | End: 2023-09-18 | Stop reason: SDUPTHER

## 2023-09-08 RX ORDER — IBUPROFEN 600 MG/1
600 TABLET ORAL EVERY 8 HOURS PRN
Qty: 90 TABLET | Refills: 1 | Status: SHIPPED | OUTPATIENT
Start: 2023-09-08

## 2023-09-08 RX ADMIN — DOCUSATE SODIUM 100 MG: 100 CAPSULE, LIQUID FILLED ORAL at 09:46

## 2023-09-08 RX ADMIN — HYDROCODONE BITARTRATE AND ACETAMINOPHEN 1 TABLET: 5; 325 TABLET ORAL at 06:10

## 2023-09-08 RX ADMIN — Medication: at 10:56

## 2023-09-08 RX ADMIN — IBUPROFEN 600 MG: 600 TABLET ORAL at 06:10

## 2023-09-08 RX ADMIN — Medication 1 TABLET: at 09:46

## 2023-09-08 NOTE — PROGRESS NOTES
Postpartum Progress Note      Status post Vaginal Delivery: Doing well postoperatively.     1) Postpartum care immediately following delivery :    Routine care, discharge home today. Reviewed discharge instructions in detail.    Rh status: O+  Rubella: Immune  Gender: Male, s/p circumcision     Subjective     Postpartum Day 2: Vaginal delivery    The patient feels well. The patient denies emotional concerns. Pain is well controlled with current medications. The baby is well. The patient is ambulating well. The patient is tolerating a normal diet.     Objective     Vital signs in last 24 hours:  Temp:  [97.8 °F (36.6 °C)-98.1 °F (36.7 °C)] 97.9 °F (36.6 °C)  Heart Rate:  [72-86] 72  Resp:  [16-18] 16  BP: (124-133)/(63-81) 124/63      General:    alert, appears stated age, and cooperative   CV: RRR, no m/r/g   Lungs: CTAB   Abdomen:  Soft, Non-tender    Lochia:  appropriate   Uterine Fundus:   firm   Ext    Edema trace   DVT Evaluation:  No evidence of DVT seen on physical exam.     Lab Results   Component Value Date    WBC 13.30 (H) 09/07/2023    HGB 11.3 (L) 09/07/2023    HCT 34.3 09/07/2023    MCV 93.2 09/07/2023     09/07/2023       Laura Ward, JACLYN  9/8/2023  09:54 EDT

## 2023-09-08 NOTE — LACTATION NOTE
Pt reports baby has been cluster feeding. She reports her milk is starting to come in and baby has some audible swallows. Baby is latching well at this time. Educated on importance of deep latching and ways to achieve it. Pt reports baby had formula once last night during cluster feeding. Educated on baby's expected output and weight gain. Pt has [facility] info for f/u. Pt has tender nipples but intact. She has APNO as needed.    Lactation Consult Note    Evaluation Completed: 2023 10:25 EDT  Patient Name: Terri Gunter  :  2000  MRN:  [id number]     REFERRAL  INFORMATION:                          Date of Referral: 23   Person Making Referral: patient  Maternal Reason for Referral: breastfeeding currently  Infant Reason for Referral: sleepy    DELIVERY HISTORY:        Skin to skin initiation date/time: 2023  9:25 PM   Skin to skin end date/time:           MATERNAL ASSESSMENT:                               INFANT ASSESSMENT:  Information for the patient's :  Jani Frances [[id number]]   No past medical history on file.                                                                                                   MATERNAL INFANT FEEDING:                                                                       EQUIPMENT TYPE:                                 BREAST PUMPING:          LACTATION REFERRALS:

## 2023-09-18 ENCOUNTER — TELEPHONE (OUTPATIENT)
Dept: OBSTETRICS AND GYNECOLOGY | Facility: CLINIC | Age: 23
End: 2023-09-18
Payer: COMMERCIAL

## 2023-09-18 RX ORDER — HYDROCODONE BITARTRATE AND ACETAMINOPHEN 5; 325 MG/1; MG/1
1 TABLET ORAL EVERY 6 HOURS PRN
Qty: 6 TABLET | Refills: 0 | Status: SHIPPED | OUTPATIENT
Start: 2023-09-18

## 2023-09-18 NOTE — TELEPHONE ENCOUNTER
----- Message from Terri Gunter sent at 9/18/2023  3:20 PM EDT -----  Regarding: Pain  Contact: [phone]  Good afternoon, I’ve been experiencing more pain than usual and just the one ibuprofen is not really helping. Are you feeling able to refill the other medicine I got sent some with? Lower back, headaches, and pelvis pain.

## 2023-10-18 ENCOUNTER — TELEPHONE (OUTPATIENT)
Dept: OBSTETRICS AND GYNECOLOGY | Facility: CLINIC | Age: 23
End: 2023-10-18
Payer: COMMERCIAL

## 2023-10-26 ENCOUNTER — TELEPHONE (OUTPATIENT)
Dept: OBSTETRICS AND GYNECOLOGY | Facility: CLINIC | Age: 23
End: 2023-10-26
Payer: COMMERCIAL

## 2024-04-18 ENCOUNTER — OFFICE VISIT (OUTPATIENT)
Dept: OBSTETRICS AND GYNECOLOGY | Facility: CLINIC | Age: 24
End: 2024-04-18
Payer: COMMERCIAL

## 2024-04-18 VITALS
SYSTOLIC BLOOD PRESSURE: 120 MMHG | WEIGHT: 243 LBS | HEIGHT: 65 IN | BODY MASS INDEX: 40.48 KG/M2 | DIASTOLIC BLOOD PRESSURE: 80 MMHG

## 2024-04-18 DIAGNOSIS — Z01.419 ENCOUNTER FOR GYNECOLOGICAL EXAMINATION WITHOUT ABNORMAL FINDING: Primary | ICD-10-CM

## 2024-04-18 DIAGNOSIS — Z30.011 ENCOUNTER FOR INITIAL PRESCRIPTION OF CONTRACEPTIVE PILLS: ICD-10-CM

## 2024-04-18 LAB
B-HCG UR QL: NEGATIVE
EXPIRATION DATE: NORMAL
INTERNAL NEGATIVE CONTROL: NEGATIVE
INTERNAL POSITIVE CONTROL: POSITIVE
Lab: NORMAL

## 2024-04-18 RX ORDER — DROSPIRENONE AND ESTETROL 3-14.2(28)
1 KIT ORAL DAILY
Qty: 84 TABLET | Refills: 2 | Status: SHIPPED | OUTPATIENT
Start: 2024-04-18

## 2024-04-18 NOTE — PROGRESS NOTES
GYN ANNUAL EXAM     Chief Complaint   Patient presents with    Annual Exam     AE and last pap  normal       HPI    Terri is a 23 y.o. female who presents for annual well woman exam. She is a patient of Dr. Riojas. Patient denies history of migraine with aura, history of blood clots, hypertension, and smoking. She does report some vaginal discharge and would like a swab done today.       OB History          3    Para   3    Term   3            AB        Living   3         SAB        IAB        Ectopic        Molar        Multiple   0    Live Births   3                SUBJECTIVE    MENSTRUAL & SEXUAL HEALTH  LMP: No LMP recorded (lmp unknown).  Menses regularity: regular every 28-30 days  Menses length: 4 days  Dysmenorrhea: none  Cyclic symptoms: none  Current contraception: none  Last pap: , Normal PAP  History of abnormal pap: no  History of STD: yes  Family history of cancer: denies       Family history of breast cancer: no  Performs SBE: performs monthly.  Incontinence: no  Dyspareunia: no    Past Medical History:   Diagnosis Date    Asthma     states has misplaced inhaler, hasn't used inhaler since 2  mo pregnant    Chlamydia     Gonorrhea     Multigravida in first trimester 2018       Past Surgical History:   Procedure Laterality Date    WISDOM TOOTH EXTRACTION           Current Outpatient Medications:     ibuprofen (ADVIL,MOTRIN) 600 MG tablet, Take 1 tablet by mouth Every 8 (Eight) Hours As Needed for Mild Pain (First Line: Mild pain.)., Disp: 90 tablet, Rfl: 1    Drospirenone-Estetrol (Nextstellis) 3-14.2 MG tablet, Take 1 tablet by mouth Daily., Disp: 84 tablet, Rfl: 2    No Known Allergies    Social History     Tobacco Use    Smoking status: Former    Smokeless tobacco: Never   Vaping Use    Vaping status: Never Used   Substance Use Topics    Alcohol use: No    Drug use: No       Family History   Problem Relation Age of Onset    Diabetes Father     Breast cancer Neg Hx      "Ovarian cancer Neg Hx     Uterine cancer Neg Hx     Colon cancer Neg Hx        Review of Systems   Constitutional:  Negative for fatigue, unexpected weight gain and unexpected weight loss.   Gastrointestinal:  Negative for abdominal pain.   Genitourinary:  Negative for decreased libido, difficulty urinating, dyspareunia, dysuria, pelvic pain, pelvic pressure, urgency, urinary incontinence and vaginal discharge.   All other systems reviewed and are negative.      OBJECTIVE    /80   Ht 165.1 cm (65\")   Wt 110 kg (243 lb)   LMP  (LMP Unknown)   BMI 40.44 kg/m²     Physical Exam  Constitutional:       General: She is awake. She is not in acute distress.     Appearance: Normal appearance. She is well-developed and well-groomed. She is not ill-appearing.   Genitourinary:      Vulva, bladder and urethral meatus normal.      Right Labia: No rash, tenderness, lesions or skin changes.     Left Labia: No tenderness, lesions, skin changes or rash.     No labial fusion noted.      No inguinal adenopathy present in the right or left side.     No vaginal discharge, erythema, tenderness, bleeding, ulceration or granulation tissue.      No vaginal prolapse present.     No vaginal atrophy present.     No cervical discharge, friability, lesion, polyp, eversion or elongation.      Uterus is not enlarged, tender or prolapsed.      Pelvic exam was performed with patient in the lithotomy position.   Breasts:     Breasts are symmetrical.      Breasts are soft.     Right: Normal.      Left: Normal.   HENT:      Head: Normocephalic and atraumatic.   Eyes:      General: No scleral icterus.     Conjunctiva/sclera: Conjunctivae normal.   Cardiovascular:      Rate and Rhythm: Normal rate and regular rhythm.      Heart sounds: Normal heart sounds.   Pulmonary:      Effort: Pulmonary effort is normal.      Breath sounds: Normal breath sounds.   Abdominal:      Palpations: Abdomen is soft.      Hernia: There is no hernia in the left " inguinal area or right inguinal area.   Musculoskeletal:         General: Normal range of motion.      Cervical back: Normal range of motion.   Lymphadenopathy:      Lower Body: No right inguinal adenopathy. No left inguinal adenopathy.   Neurological:      Mental Status: She is alert.   Skin:     General: Skin is warm and dry.      Coloration: Skin is not jaundiced or pale.   Psychiatric:         Behavior: Behavior normal. Behavior is cooperative.   Vitals reviewed.         ASSESSMENT     Diagnoses and all orders for this visit:    1. Encounter for gynecological examination without abnormal finding (Primary)  -     IGP,CtNgTv,rfx Apt HPV All    2. Encounter for initial prescription of contraceptive pills  -     Drospirenone-Estetrol (Nextstellis) 3-14.2 MG tablet; Take 1 tablet by mouth Daily.  Dispense: 84 tablet; Refill: 2  -     POC Pregnancy, Urine       PLAN   WELL WOMAN EXAM: Pap smear collected today. Recommend MVI daily.    CONTRACEPTION: OCP (estrogen/progesterone) refilled x 1 year - ACHES discussed. Samples of Nextstellis given x 3 months.   SMOKING STATUS: former smoker.  BREAST HEALTH: Encouraged annual mammogram screening starting at age 40. Instructed on how to perform SBE. Encouraged breast health self awareness.  EXERCISE: Encouraged 150 minutes of exercise per week if not medially contraindicated.   BMI: Body mass index is 40.44 kg/m².     Return in about 1 year (around 4/18/2025) for annual physical.    I spent 15 minutes caring for Terri on this date of service. This time includes time spent by me in the following activities: preparing for the visit, reviewing tests, obtaining and/or reviewing a separately obtained history, performing a medically appropriate examination and/or evaluation, counseling and educating the patient/family/caregiver, ordering medications, tests, or procedures, referring and communicating with other health care professionals, documenting information in the medical  record, independently interpreting results and communicating that information with the patient/family/caregiver, and care coordination.    Jacqueline Holland CNM  4/18/2024  14:44 EDT

## 2024-04-20 LAB
A VAGINAE DNA VAG QL NAA+PROBE: ABNORMAL SCORE
BVAB2 DNA VAG QL NAA+PROBE: ABNORMAL SCORE
C ALBICANS DNA VAG QL NAA+PROBE: NEGATIVE
C GLABRATA DNA VAG QL NAA+PROBE: NEGATIVE
C TRACH DNA VAG QL NAA+PROBE: NEGATIVE
MEGA1 DNA VAG QL NAA+PROBE: ABNORMAL SCORE
N GONORRHOEA DNA VAG QL NAA+PROBE: NEGATIVE
T VAGINALIS DNA VAG QL NAA+PROBE: POSITIVE

## 2024-04-20 RX ORDER — METRONIDAZOLE 500 MG/1
500 TABLET ORAL 2 TIMES DAILY
Qty: 14 TABLET | Refills: 0 | Status: SHIPPED | OUTPATIENT
Start: 2024-04-20 | End: 2024-04-27

## 2024-04-22 ENCOUNTER — TELEPHONE (OUTPATIENT)
Dept: OBSTETRICS AND GYNECOLOGY | Facility: CLINIC | Age: 24
End: 2024-04-22
Payer: COMMERCIAL

## 2024-04-22 LAB
C TRACH RRNA CVX QL NAA+PROBE: NEGATIVE
CONV .: NORMAL
CYTOLOGIST CVX/VAG CYTO: NORMAL
CYTOLOGY CVX/VAG DOC CYTO: NORMAL
CYTOLOGY CVX/VAG DOC THIN PREP: NORMAL
DX ICD CODE: NORMAL
Lab: NORMAL
N GONORRHOEA RRNA CVX QL NAA+PROBE: NEGATIVE
OTHER STN SPEC: NORMAL
STAT OF ADQ CVX/VAG CYTO-IMP: NORMAL
T VAGINALIS RRNA SPEC QL NAA+PROBE: NEGATIVE

## 2024-04-22 NOTE — TELEPHONE ENCOUNTER
Patient noticed on My Chart that there are two different results on  vaginal swab and on pap about STD testing. On swab said positive for trichomonas and on pap said is normal and negative STD testing. Pt Ph [phone]

## 2024-04-23 ENCOUNTER — TELEPHONE (OUTPATIENT)
Dept: OBSTETRICS AND GYNECOLOGY | Facility: CLINIC | Age: 24
End: 2024-04-23
Payer: COMMERCIAL

## 2025-01-08 ENCOUNTER — TELEPHONE (OUTPATIENT)
Dept: OBSTETRICS AND GYNECOLOGY | Facility: CLINIC | Age: 25
End: 2025-01-08
Payer: COMMERCIAL

## 2025-01-24 ENCOUNTER — TELEPHONE (OUTPATIENT)
Dept: OBSTETRICS AND GYNECOLOGY | Facility: CLINIC | Age: 25
End: 2025-01-24
Payer: COMMERCIAL

## 2025-04-22 ENCOUNTER — OFFICE VISIT (OUTPATIENT)
Dept: OBSTETRICS AND GYNECOLOGY | Facility: CLINIC | Age: 25
End: 2025-04-22
Payer: COMMERCIAL

## 2025-04-22 VITALS
DIASTOLIC BLOOD PRESSURE: 77 MMHG | BODY MASS INDEX: 30.16 KG/M2 | HEIGHT: 65 IN | SYSTOLIC BLOOD PRESSURE: 133 MMHG | WEIGHT: 181 LBS

## 2025-04-22 DIAGNOSIS — N90.89 LABIAL IRRITATION: ICD-10-CM

## 2025-04-22 DIAGNOSIS — N64.4 MASTALGIA: ICD-10-CM

## 2025-04-22 DIAGNOSIS — R61 EXCESS, SECRETION, SWEAT: ICD-10-CM

## 2025-04-22 DIAGNOSIS — Z11.3 ENCOUNTER FOR SCREENING EXAMINATION FOR SEXUALLY TRANSMITTED DISEASE: ICD-10-CM

## 2025-04-22 DIAGNOSIS — Z01.411 ENCOUNTER FOR GYNECOLOGICAL EXAMINATION WITH ABNORMAL FINDING: Primary | ICD-10-CM

## 2025-04-22 NOTE — PROGRESS NOTES
"GYN ANNUAL EXAM     Chief Complaint   Patient presents with    Annual Exam     AE and last pap . Has pain in left breast.       MALINA Murray is a 24 y.o. female who presents for annual well woman exam. She is a patient of Dr. Riojas. she has several issues that she would like to discuss today.  The first is that she has been experiencing left breast pain and a sensation of \"feeling funny\" in her breast. The duration is unspecified but she notes it has been ongoing for \"a while.\"  She describes dense breast tissue and is unsure if any abnormality could be due to the tissue or something else.  She also notes occasional vaginal odor and potential issues with pH balance.  She reports itching primarily around the bikini area, attributed to hair removal.  No discharge is reported.  She does report increased sweating, facial hair growth post pregnancy, and overall different body response after her last child. She would like testing performed today.     OB History          3    Para   3    Term   3            AB        Living   3         SAB        IAB        Ectopic        Molar        Multiple   0    Live Births   3                SUBJECTIVE    MENSTRUAL & SEXUAL HEALTH  LMP: Patient's last menstrual period was 2025.  Menses regularity: regular every 28-30 days  Dysmenorrhea: moderate, occurring throughout menses  Cyclic symptoms: none  Current contraception: none  Last pap: , Normal PAP  History of abnormal pap: no  History of STD: Yes  Family history of cancer: denies       Family history of breast cancer: no  Performs SBE: performs monthly.  Incontinence: Patient reports that she is not currently experiencing any symptoms of urinary incontinence.   Dyspareunia: no    Past Medical History:   Diagnosis Date    Asthma     states has misplaced inhaler, hasn't used inhaler since 2  mo pregnant    Chlamydia     Gonorrhea     Multigravida in first trimester 2018       Past Surgical " "History:   Procedure Laterality Date    WISDOM TOOTH EXTRACTION           Current Outpatient Medications:     Drospirenone-Estetrol (Nextstellis) 3-14.2 MG tablet, Take 1 tablet by mouth Daily. (Patient not taking: Reported on 4/22/2025), Disp: 84 tablet, Rfl: 2    ibuprofen (ADVIL,MOTRIN) 600 MG tablet, Take 1 tablet by mouth Every 8 (Eight) Hours As Needed for Mild Pain (First Line: Mild pain.). (Patient not taking: Reported on 4/22/2025), Disp: 90 tablet, Rfl: 1    Lidocaine 4 % gel, Apply 1 Application topically 2 (Two) Times a Day. Apply a pea sized amount to affected area twice daily., Disp: 50 g, Rfl: 0    metroNIDAZOLE (FLAGYL) 500 MG tablet, Take 1 tablet by mouth 2 (Two) Times a Day for 7 days., Disp: 14 tablet, Rfl: 0    No Known Allergies    Social History     Tobacco Use    Smoking status: Former    Smokeless tobacco: Never   Vaping Use    Vaping status: Never Used   Substance Use Topics    Alcohol use: No    Drug use: No       Family History   Problem Relation Age of Onset    Diabetes Father     Breast cancer Neg Hx     Ovarian cancer Neg Hx     Uterine cancer Neg Hx     Colon cancer Neg Hx        Review of Systems   Constitutional:  Negative for fatigue, unexpected weight gain and unexpected weight loss.   Gastrointestinal:  Negative for abdominal pain.   Genitourinary:  Positive for breast pain and vaginal pain (Reports labial irritation.). Negative for breast discharge, decreased libido, difficulty urinating, dyspareunia, dysuria, pelvic pain, pelvic pressure, urgency, urinary incontinence and vaginal discharge.   All other systems reviewed and are negative.      OBJECTIVE    /77   Ht 165.1 cm (65\")   Wt 82.1 kg (181 lb)   LMP 04/16/2025   Breastfeeding No   BMI 30.12 kg/m²     Physical Exam  Constitutional:       General: She is awake. She is not in acute distress.     Appearance: Normal appearance. She is well-developed and well-groomed. She is not ill-appearing.   Genitourinary:      " Vulva, bladder and urethral meatus normal.      Right Labia: No rash, tenderness, lesions or skin changes.     Left Labia: No tenderness, lesions, skin changes or rash.     No labial fusion noted.      No inguinal adenopathy present in the right or left side.     No vaginal discharge, erythema, tenderness, bleeding, ulceration or granulation tissue.      No vaginal prolapse present.     No vaginal atrophy present.     No cervical discharge, friability, lesion, polyp, eversion or elongation.      Uterus is not enlarged, tender or prolapsed.      Pelvic exam was performed with patient in the lithotomy position.   Breasts:     Breasts are symmetrical.      Breasts are soft.     Right: Normal.      Left: Normal.   HENT:      Head: Normocephalic and atraumatic.   Eyes:      General: No scleral icterus.     Conjunctiva/sclera: Conjunctivae normal.   Cardiovascular:      Rate and Rhythm: Normal rate and regular rhythm.      Heart sounds: Normal heart sounds.   Pulmonary:      Effort: Pulmonary effort is normal.      Breath sounds: Normal breath sounds.   Abdominal:      Palpations: Abdomen is soft.      Hernia: There is no hernia in the left inguinal area or right inguinal area.   Musculoskeletal:         General: Normal range of motion.      Cervical back: Normal range of motion.   Lymphadenopathy:      Lower Body: No right inguinal adenopathy. No left inguinal adenopathy.   Neurological:      Mental Status: She is alert.   Skin:     General: Skin is warm and dry.      Coloration: Skin is not jaundiced or pale.   Psychiatric:         Behavior: Behavior normal. Behavior is cooperative.   Vitals reviewed.         ASSESSMENT     Diagnoses and all orders for this visit:    1. Encounter for gynecological examination with abnormal finding (Primary)  -     IGP,CtNgTv,rfx Apt HPV All    2. Mastalgia  -     Mammo Diagnostic Digital Tomosynthesis Bilateral With CAD; Future  -     US Breast Left Limited; Future    3. Excess,  secretion, sweat  -     TSH  -     T4, free  -     FSH & LH  -     Estradiol  -     Estrogens, Total  -     Hemoglobin A1c    4. Encounter for screening examination for sexually transmitted disease  -     Hepatitis B Surface Antigen  -     Hepatitis C Antibody  -     HIV-1 / O / 2 Ag / Antibody  -     HSV 1 & 2 - Specific Antibody, IgG  -     Treponema pallidum AB w/Reflex RPR  -     NuSwab BV & Candida - Swab, Vagina    5. Labial irritation  -     Lidocaine 4 % gel; Apply 1 Application topically 2 (Two) Times a Day. Apply a pea sized amount to affected area twice daily.  Dispense: 50 g; Refill: 0         PLAN   WELL WOMAN EXAM: Pap smear collected today. Recommend MVI daily.    CONTRACEPTION: none.   SMOKING STATUS: former smoker.  BREAST HEALTH: Encouraged annual mammogram screening starting at age 40. Instructed on how to perform SBE. Encouraged breast health self awareness.  EXERCISE: Encouraged 150 minutes of exercise per week if not medially contraindicated.   BMI: Body mass index is 30.12 kg/m².     Return for annual exam or as needed before.    I spent 30 minutes caring for Terri on this date of service. This time includes time spent by me in the following activities, but not limited to: preparing for the visit, reviewing tests, performing a medically appropriate examination and/or evaluation, counseling and educating the patient/family/caregiver, referring and communicating with other health care professionals, documenting information in the medical record, independently interpreting results and communicating that information with the patient/family/caregiver, care coordination, ordering medications, ordering test(s), ordering procedure(s), obtaining a separately obtained history, and reviewing a separately obtained history.    Jacqueline Holland CNM  4/26/2025  10:31 EDT

## 2025-04-23 LAB
ESTRADIOL SERPL-MCNC: 77.8 PG/ML
FSH SERPL-ACNC: 6.8 MIU/ML
HBA1C MFR BLD: 5.4 % (ref 4.8–5.6)
HBV SURFACE AG SERPL QL IA: NEGATIVE
HCV IGG SERPL QL IA: NON REACTIVE
HIV 1+2 AB+HIV1 P24 AG SERPL QL IA: NON REACTIVE
HSV1 IGG SERPL QL IA: NON REACTIVE
HSV2 IGG SERPL QL IA: NON REACTIVE
LH SERPL-ACNC: 8.8 MIU/ML
T4 FREE SERPL-MCNC: 0.93 NG/DL (ref 0.82–1.77)
TSH SERPL DL<=0.005 MIU/L-ACNC: 0.94 UIU/ML (ref 0.45–4.5)

## 2025-04-24 ENCOUNTER — RESULTS FOLLOW-UP (OUTPATIENT)
Dept: OBSTETRICS AND GYNECOLOGY | Facility: CLINIC | Age: 25
End: 2025-04-24
Payer: COMMERCIAL

## 2025-04-24 ENCOUNTER — PATIENT MESSAGE (OUTPATIENT)
Dept: OBSTETRICS AND GYNECOLOGY | Facility: CLINIC | Age: 25
End: 2025-04-24
Payer: COMMERCIAL

## 2025-04-24 LAB
A VAGINAE DNA VAG QL NAA+PROBE: ABNORMAL SCORE
BVAB2 DNA VAG QL NAA+PROBE: ABNORMAL SCORE
C ALBICANS DNA VAG QL NAA+PROBE: NEGATIVE
C GLABRATA DNA VAG QL NAA+PROBE: NEGATIVE
MEGA1 DNA VAG QL NAA+PROBE: ABNORMAL SCORE
TREPONEMA PALLIDUM IGG+IGM AB [PRESENCE] IN SERUM OR PLASMA BY IMMUNOASSAY: NON REACTIVE

## 2025-04-24 RX ORDER — METRONIDAZOLE 500 MG/1
500 TABLET ORAL 2 TIMES DAILY
Qty: 14 TABLET | Refills: 0 | Status: SHIPPED | OUTPATIENT
Start: 2025-04-24 | End: 2025-05-01

## 2025-04-25 LAB
C TRACH RRNA CVX QL NAA+PROBE: NEGATIVE
CONV .: NORMAL
CYTOLOGIST CVX/VAG CYTO: NORMAL
CYTOLOGY CVX/VAG DOC CYTO: NORMAL
CYTOLOGY CVX/VAG DOC THIN PREP: NORMAL
DX ICD CODE: NORMAL
ESTROGEN SERPL-MCNC: 155 PG/ML
N GONORRHOEA RRNA CVX QL NAA+PROBE: NEGATIVE
OTHER STN SPEC: NORMAL
SERVICE CMNT-IMP: NORMAL
STAT OF ADQ CVX/VAG CYTO-IMP: NORMAL
T VAGINALIS RRNA SPEC QL NAA+PROBE: NEGATIVE

## 2025-05-12 ENCOUNTER — TELEPHONE (OUTPATIENT)
Dept: OBSTETRICS AND GYNECOLOGY | Facility: CLINIC | Age: 25
End: 2025-05-12
Payer: COMMERCIAL

## 2025-06-05 ENCOUNTER — HOSPITAL ENCOUNTER (OUTPATIENT)
Dept: ULTRASOUND IMAGING | Facility: HOSPITAL | Age: 25
Discharge: HOME | End: 2025-06-05
Payer: COMMERCIAL

## 2025-06-05 ENCOUNTER — HOSPITAL ENCOUNTER (OUTPATIENT)
Dept: MAMMOGRAPHY | Facility: HOSPITAL | Age: 25
End: 2025-06-05
Payer: COMMERCIAL

## 2025-06-05 DIAGNOSIS — N64.4 MASTALGIA: ICD-10-CM

## 2025-06-05 PROCEDURE — 76642 ULTRASOUND BREAST LIMITED: CPT
